# Patient Record
Sex: MALE | Race: WHITE | NOT HISPANIC OR LATINO | Employment: OTHER | URBAN - METROPOLITAN AREA
[De-identification: names, ages, dates, MRNs, and addresses within clinical notes are randomized per-mention and may not be internally consistent; named-entity substitution may affect disease eponyms.]

---

## 2019-09-03 ENCOUNTER — HOSPITAL ENCOUNTER (OUTPATIENT)
Dept: FAMILY MEDICINE CLINIC | Facility: CLINIC | Age: 69
Discharge: HOME | End: 2019-09-03
Attending: FAMILY MEDICINE

## 2019-09-03 LAB
ALBUMIN SERPL-MCNC: 4.7 G/DL (ref 3.5–5)
ALBUMIN/GLOB SERPL: 1.7 {RATIO} (ref 1.4–2.6)
ALP SERPL-CCNC: 67 U/L (ref 56–155)
ALT SERPL-CCNC: 7 U/L (ref 10–40)
ANION GAP SERPL CALC-SCNC: 17 MMOL/L (ref 8–19)
AST SERPL-CCNC: 16 U/L (ref 15–50)
BASOPHILS # BLD AUTO: 0.04 10*3/UL (ref 0–0.2)
BASOPHILS NFR BLD AUTO: 0.6 % (ref 0–3)
BILIRUB SERPL-MCNC: 0.97 MG/DL (ref 0.2–1.3)
BUN SERPL-MCNC: 10 MG/DL (ref 5–25)
BUN/CREAT SERPL: 8 {RATIO} (ref 6–20)
CALCIUM SERPL-MCNC: 9.5 MG/DL (ref 8.7–10.4)
CHLORIDE SERPL-SCNC: 97 MMOL/L (ref 99–111)
CONV ABS IMM GRAN: 0.04 10*3/UL (ref 0–0.2)
CONV CO2: 29 MMOL/L (ref 22–32)
CONV IMMATURE GRAN: 0.6 % (ref 0–1.8)
CONV TOTAL PROTEIN: 7.5 G/DL (ref 6.3–8.2)
CREAT UR-MCNC: 1.27 MG/DL (ref 0.7–1.2)
DEPRECATED RDW RBC AUTO: 47.2 FL (ref 35.1–43.9)
EOSINOPHIL # BLD AUTO: 0.19 10*3/UL (ref 0–0.7)
EOSINOPHIL # BLD AUTO: 3 % (ref 0–7)
ERYTHROCYTE [DISTWIDTH] IN BLOOD BY AUTOMATED COUNT: 13.3 % (ref 11.6–14.4)
GFR SERPLBLD BASED ON 1.73 SQ M-ARVRAT: 58 ML/MIN/{1.73_M2}
GLOBULIN UR ELPH-MCNC: 2.8 G/DL (ref 2–3.5)
GLUCOSE SERPL-MCNC: 99 MG/DL (ref 70–99)
HCT VFR BLD AUTO: 48.8 % (ref 42–52)
HGB BLD-MCNC: 16.5 G/DL (ref 14–18)
LYMPHOCYTES # BLD AUTO: 2.3 10*3/UL (ref 1–5)
LYMPHOCYTES NFR BLD AUTO: 36 % (ref 20–45)
MCH RBC QN AUTO: 32.5 PG (ref 27–31)
MCHC RBC AUTO-ENTMCNC: 33.8 G/DL (ref 33–37)
MCV RBC AUTO: 96.3 FL (ref 80–96)
MONOCYTES # BLD AUTO: 0.57 10*3/UL (ref 0.2–1.2)
MONOCYTES NFR BLD AUTO: 8.9 % (ref 3–10)
NEUTROPHILS # BLD AUTO: 3.25 10*3/UL (ref 2–8)
NEUTROPHILS NFR BLD AUTO: 50.9 % (ref 30–85)
NRBC CBCN: 0 % (ref 0–0.7)
OSMOLALITY SERPL CALC.SUM OF ELEC: 287 MOSM/KG (ref 273–304)
PLATELET # BLD AUTO: 112 10*3/UL (ref 130–400)
PMV BLD AUTO: 9.1 FL (ref 9.4–12.4)
POTASSIUM SERPL-SCNC: 4.4 MMOL/L (ref 3.5–5.3)
RBC # BLD AUTO: 5.07 10*6/UL (ref 4.7–6.1)
SODIUM SERPL-SCNC: 139 MMOL/L (ref 135–147)
TSH SERPL-ACNC: 1.88 M[IU]/L (ref 0.27–4.2)
WBC # BLD AUTO: 6.39 10*3/UL (ref 4.8–10.8)

## 2019-12-18 ENCOUNTER — HOSPITAL ENCOUNTER (OUTPATIENT)
Dept: FAMILY MEDICINE CLINIC | Facility: CLINIC | Age: 69
Discharge: HOME | End: 2019-12-18
Attending: FAMILY MEDICINE

## 2019-12-18 LAB
ALBUMIN SERPL-MCNC: 4.2 G/DL (ref 3.5–5)
ALBUMIN/GLOB SERPL: 1.6 {RATIO} (ref 1.4–2.6)
ALP SERPL-CCNC: 79 U/L (ref 56–155)
ALT SERPL-CCNC: 8 U/L (ref 10–40)
AMYLASE SERPL-CCNC: 40 U/L (ref 30–110)
ANION GAP SERPL CALC-SCNC: 15 MMOL/L (ref 8–19)
AST SERPL-CCNC: 19 U/L (ref 15–50)
BASOPHILS # BLD AUTO: 0.03 10*3/UL (ref 0–0.2)
BASOPHILS NFR BLD AUTO: 0.4 % (ref 0–3)
BILIRUB SERPL-MCNC: 1.61 MG/DL (ref 0.2–1.3)
BUN SERPL-MCNC: 10 MG/DL (ref 5–25)
BUN/CREAT SERPL: 9 {RATIO} (ref 6–20)
CALCIUM SERPL-MCNC: 9.2 MG/DL (ref 8.7–10.4)
CHLORIDE SERPL-SCNC: 82 MMOL/L (ref 99–111)
CONV ABS IMM GRAN: 0.16 10*3/UL (ref 0–0.2)
CONV CO2: 27 MMOL/L (ref 22–32)
CONV IMMATURE GRAN: 2 % (ref 0–1.8)
CONV TOTAL PROTEIN: 6.9 G/DL (ref 6.3–8.2)
CREAT UR-MCNC: 1.13 MG/DL (ref 0.7–1.2)
DEPRECATED RDW RBC AUTO: 46.3 FL (ref 35.1–43.9)
EOSINOPHIL # BLD AUTO: 0.08 10*3/UL (ref 0–0.7)
EOSINOPHIL # BLD AUTO: 1 % (ref 0–7)
ERYTHROCYTE [DISTWIDTH] IN BLOOD BY AUTOMATED COUNT: 13.3 % (ref 11.6–14.4)
GFR SERPLBLD BASED ON 1.73 SQ M-ARVRAT: >60 ML/MIN/{1.73_M2}
GLOBULIN UR ELPH-MCNC: 2.7 G/DL (ref 2–3.5)
GLUCOSE SERPL-MCNC: 91 MG/DL (ref 70–99)
HCT VFR BLD AUTO: 43.4 % (ref 42–52)
HGB BLD-MCNC: 15.4 G/DL (ref 14–18)
LIPASE SERPL-CCNC: 24 U/L (ref 5–51)
LYMPHOCYTES # BLD AUTO: 2.08 10*3/UL (ref 1–5)
LYMPHOCYTES NFR BLD AUTO: 25.6 % (ref 20–45)
MCH RBC QN AUTO: 33.4 PG (ref 27–31)
MCHC RBC AUTO-ENTMCNC: 35.5 G/DL (ref 33–37)
MCV RBC AUTO: 94.1 FL (ref 80–96)
MONOCYTES # BLD AUTO: 0.74 10*3/UL (ref 0.2–1.2)
MONOCYTES NFR BLD AUTO: 9.1 % (ref 3–10)
NEUTROPHILS # BLD AUTO: 5.03 10*3/UL (ref 2–8)
NEUTROPHILS NFR BLD AUTO: 61.9 % (ref 30–85)
NRBC CBCN: 0 % (ref 0–0.7)
OSMOLALITY SERPL CALC.SUM OF ELEC: 247 MOSM/KG (ref 273–304)
PLATELET # BLD AUTO: 143 10*3/UL (ref 130–400)
PMV BLD AUTO: 9.3 FL (ref 9.4–12.4)
POTASSIUM SERPL-SCNC: 4.5 MMOL/L (ref 3.5–5.3)
RBC # BLD AUTO: 4.61 10*6/UL (ref 4.7–6.1)
SODIUM SERPL-SCNC: 119 MMOL/L (ref 135–147)
TSH SERPL-ACNC: 2.58 M[IU]/L (ref 0.27–4.2)
WBC # BLD AUTO: 8.12 10*3/UL (ref 4.8–10.8)

## 2019-12-23 ENCOUNTER — HOSPITAL ENCOUNTER (OUTPATIENT)
Dept: FAMILY MEDICINE CLINIC | Facility: CLINIC | Age: 69
Discharge: HOME | End: 2019-12-23
Attending: FAMILY MEDICINE

## 2019-12-23 LAB
ALBUMIN SERPL-MCNC: 4.2 G/DL (ref 3.5–5)
ALBUMIN/GLOB SERPL: 1.6 {RATIO} (ref 1.4–2.6)
ALP SERPL-CCNC: 74 U/L (ref 56–155)
ALT SERPL-CCNC: 9 U/L (ref 10–40)
ANION GAP SERPL CALC-SCNC: 16 MMOL/L (ref 8–19)
AST SERPL-CCNC: 18 U/L (ref 15–50)
BILIRUB SERPL-MCNC: 0.84 MG/DL (ref 0.2–1.3)
BUN SERPL-MCNC: 10 MG/DL (ref 5–25)
BUN/CREAT SERPL: 13 {RATIO} (ref 6–20)
CALCIUM SERPL-MCNC: 9.2 MG/DL (ref 8.7–10.4)
CHLORIDE SERPL-SCNC: 78 MMOL/L (ref 99–111)
CONV CO2: 25 MMOL/L (ref 22–32)
CONV TOTAL PROTEIN: 6.9 G/DL (ref 6.3–8.2)
CREAT UR-MCNC: 0.8 MG/DL (ref 0.7–1.2)
GFR SERPLBLD BASED ON 1.73 SQ M-ARVRAT: >60 ML/MIN/{1.73_M2}
GLOBULIN UR ELPH-MCNC: 2.7 G/DL (ref 2–3.5)
GLUCOSE SERPL-MCNC: 102 MG/DL (ref 70–99)
OSMOLALITY SERPL CALC.SUM OF ELEC: 239 MOSM/KG (ref 273–304)
POTASSIUM SERPL-SCNC: 3.6 MMOL/L (ref 3.5–5.3)
SODIUM SERPL-SCNC: 115 MMOL/L (ref 135–147)

## 2019-12-27 ENCOUNTER — HOSPITAL ENCOUNTER (OUTPATIENT)
Dept: LAB | Facility: HOSPITAL | Age: 69
Discharge: HOME | End: 2019-12-27
Attending: FAMILY MEDICINE

## 2019-12-27 LAB
ANION GAP SERPL CALC-SCNC: 15 MMOL/L (ref 8–19)
BUN SERPL-MCNC: 6 MG/DL (ref 5–25)
BUN/CREAT SERPL: 6 {RATIO} (ref 6–20)
CALCIUM SERPL-MCNC: 9.1 MG/DL (ref 8.7–10.4)
CHLORIDE SERPL-SCNC: 93 MMOL/L (ref 99–111)
CHOLEST SERPL-MCNC: 147 MG/DL (ref 107–200)
CHOLEST/HDLC SERPL: 4 {RATIO} (ref 3–6)
CONV CO2: 27 MMOL/L (ref 22–32)
CREAT UR-MCNC: 0.98 MG/DL (ref 0.7–1.2)
GFR SERPLBLD BASED ON 1.73 SQ M-ARVRAT: >60 ML/MIN/{1.73_M2}
GLUCOSE SERPL-MCNC: 96 MG/DL (ref 70–99)
HDLC SERPL-MCNC: 37 MG/DL (ref 40–60)
LDLC SERPL CALC-MCNC: 77 MG/DL (ref 70–100)
OSMOLALITY SERPL CALC.SUM OF ELEC: 269 MOSM/KG (ref 273–304)
POTASSIUM SERPL-SCNC: 4 MMOL/L (ref 3.5–5.3)
SODIUM SERPL-SCNC: 131 MMOL/L (ref 135–147)
TRIGL SERPL-MCNC: 166 MG/DL (ref 40–150)
VLDLC SERPL-MCNC: 33 MG/DL (ref 5–37)

## 2020-02-18 ENCOUNTER — HOSPITAL ENCOUNTER (OUTPATIENT)
Dept: FAMILY MEDICINE CLINIC | Facility: CLINIC | Age: 70
Discharge: HOME | End: 2020-02-18
Attending: FAMILY MEDICINE

## 2020-02-18 LAB
ALBUMIN SERPL-MCNC: 4.2 G/DL (ref 3.5–5)
ALBUMIN/GLOB SERPL: 1.5 {RATIO} (ref 1.4–2.6)
ALP SERPL-CCNC: 75 U/L (ref 56–155)
ALT SERPL-CCNC: 6 U/L (ref 10–40)
ANION GAP SERPL CALC-SCNC: 18 MMOL/L (ref 8–19)
AST SERPL-CCNC: 15 U/L (ref 15–50)
BASOPHILS # BLD AUTO: 0.03 10*3/UL (ref 0–0.2)
BASOPHILS NFR BLD AUTO: 0.4 % (ref 0–3)
BILIRUB SERPL-MCNC: 0.78 MG/DL (ref 0.2–1.3)
BUN SERPL-MCNC: 8 MG/DL (ref 5–25)
BUN/CREAT SERPL: 7 {RATIO} (ref 6–20)
CALCIUM SERPL-MCNC: 9.4 MG/DL (ref 8.7–10.4)
CHLORIDE SERPL-SCNC: 87 MMOL/L (ref 99–111)
CONV ABS IMM GRAN: 0.08 10*3/UL (ref 0–0.2)
CONV CO2: 26 MMOL/L (ref 22–32)
CONV IMMATURE GRAN: 1.1 % (ref 0–1.8)
CONV TOTAL PROTEIN: 7 G/DL (ref 6.3–8.2)
CREAT UR-MCNC: 1.11 MG/DL (ref 0.7–1.2)
DEPRECATED RDW RBC AUTO: 43.8 FL (ref 35.1–43.9)
EOSINOPHIL # BLD AUTO: 0.23 10*3/UL (ref 0–0.7)
EOSINOPHIL # BLD AUTO: 3.2 % (ref 0–7)
ERYTHROCYTE [DISTWIDTH] IN BLOOD BY AUTOMATED COUNT: 12.6 % (ref 11.6–14.4)
GFR SERPLBLD BASED ON 1.73 SQ M-ARVRAT: >60 ML/MIN/{1.73_M2}
GLOBULIN UR ELPH-MCNC: 2.8 G/DL (ref 2–3.5)
GLUCOSE SERPL-MCNC: 106 MG/DL (ref 70–99)
HCT VFR BLD AUTO: 46.7 % (ref 42–52)
HGB BLD-MCNC: 15.6 G/DL (ref 14–18)
LYMPHOCYTES # BLD AUTO: 2.35 10*3/UL (ref 1–5)
LYMPHOCYTES NFR BLD AUTO: 32.5 % (ref 20–45)
MCH RBC QN AUTO: 31.9 PG (ref 27–31)
MCHC RBC AUTO-ENTMCNC: 33.4 G/DL (ref 33–37)
MCV RBC AUTO: 95.5 FL (ref 80–96)
MONOCYTES # BLD AUTO: 0.55 10*3/UL (ref 0.2–1.2)
MONOCYTES NFR BLD AUTO: 7.6 % (ref 3–10)
NEUTROPHILS # BLD AUTO: 4 10*3/UL (ref 2–8)
NEUTROPHILS NFR BLD AUTO: 55.2 % (ref 30–85)
NRBC CBCN: 0 % (ref 0–0.7)
OSMOLALITY SERPL CALC.SUM OF ELEC: 263 MOSM/KG (ref 273–304)
PLATELET # BLD AUTO: 138 10*3/UL (ref 130–400)
PMV BLD AUTO: 9.2 FL (ref 9.4–12.4)
POTASSIUM SERPL-SCNC: 4 MMOL/L (ref 3.5–5.3)
RBC # BLD AUTO: 4.89 10*6/UL (ref 4.7–6.1)
SODIUM SERPL-SCNC: 127 MMOL/L (ref 135–147)
WBC # BLD AUTO: 7.24 10*3/UL (ref 4.8–10.8)

## 2020-12-09 ENCOUNTER — HOSPITAL ENCOUNTER (OUTPATIENT)
Dept: CARDIOLOGY | Facility: HOSPITAL | Age: 70
Discharge: HOME | End: 2020-12-09
Attending: FAMILY MEDICINE

## 2021-04-19 ENCOUNTER — HOSPITAL ENCOUNTER (OUTPATIENT)
Dept: URGENT CARE | Facility: CLINIC | Age: 71
Discharge: HOME | End: 2021-04-19
Attending: NURSE PRACTITIONER

## 2021-06-24 ENCOUNTER — OFFICE VISIT (OUTPATIENT)
Dept: FAMILY MEDICINE CLINIC | Facility: CLINIC | Age: 71
End: 2021-06-24

## 2021-06-24 VITALS
HEART RATE: 72 BPM | TEMPERATURE: 97.2 F | SYSTOLIC BLOOD PRESSURE: 145 MMHG | BODY MASS INDEX: 20.72 KG/M2 | OXYGEN SATURATION: 97 % | HEIGHT: 71 IN | WEIGHT: 148 LBS | DIASTOLIC BLOOD PRESSURE: 67 MMHG

## 2021-06-24 DIAGNOSIS — Z12.5 SCREENING FOR PROSTATE CANCER: ICD-10-CM

## 2021-06-24 DIAGNOSIS — I25.10 CORONARY ARTERY DISEASE INVOLVING NATIVE HEART WITHOUT ANGINA PECTORIS, UNSPECIFIED VESSEL OR LESION TYPE: ICD-10-CM

## 2021-06-24 DIAGNOSIS — Z00.00 ANNUAL PHYSICAL EXAM: ICD-10-CM

## 2021-06-24 DIAGNOSIS — E78.2 MIXED HYPERLIPIDEMIA: ICD-10-CM

## 2021-06-24 DIAGNOSIS — Z51.81 ENCOUNTER FOR MEDICATION MONITORING: ICD-10-CM

## 2021-06-24 DIAGNOSIS — Z72.0 TOBACCO ABUSE: ICD-10-CM

## 2021-06-24 DIAGNOSIS — I10 ESSENTIAL HYPERTENSION: Primary | ICD-10-CM

## 2021-06-24 PROBLEM — F51.01 PRIMARY INSOMNIA: Status: ACTIVE | Noted: 2021-06-24

## 2021-06-24 PROBLEM — Z85.51 HISTORY OF BLADDER CANCER: Status: ACTIVE | Noted: 2021-06-24

## 2021-06-24 LAB
POC AMPHETAMINES: NEGATIVE
POC BARBITURATES: NEGATIVE
POC BENZODIAZEPHINES: POSITIVE
POC COCAINE: NEGATIVE
POC METHADONE: NEGATIVE
POC METHAMPHETAMINE SCREEN URINE: NEGATIVE
POC OPIATES: NEGATIVE
POC OXYCODONE: NEGATIVE
POC PHENCYCLIDINE: NEGATIVE
POC PROPOXYPHENE: NEGATIVE
POC THC: NEGATIVE
POC TRICYCLIC ANTIDEPRESSANTS: NEGATIVE

## 2021-06-24 PROCEDURE — 99204 OFFICE O/P NEW MOD 45 MIN: CPT | Performed by: FAMILY MEDICINE

## 2021-06-24 PROCEDURE — 80305 DRUG TEST PRSMV DIR OPT OBS: CPT | Performed by: FAMILY MEDICINE

## 2021-06-24 RX ORDER — ROSUVASTATIN CALCIUM 5 MG/1
5 TABLET, COATED ORAL
COMMUNITY
Start: 2021-05-11 | End: 2021-06-24 | Stop reason: SDUPTHER

## 2021-06-24 RX ORDER — ALPRAZOLAM 0.25 MG/1
0.25 TABLET ORAL NIGHTLY PRN
Qty: 30 TABLET | Refills: 2 | Status: SHIPPED | OUTPATIENT
Start: 2021-06-24 | End: 2022-01-20 | Stop reason: SDUPTHER

## 2021-06-24 RX ORDER — ALPRAZOLAM 0.25 MG/1
0.25 TABLET ORAL NIGHTLY PRN
COMMUNITY
End: 2021-06-24 | Stop reason: SDUPTHER

## 2021-06-24 RX ORDER — METOPROLOL SUCCINATE 25 MG/1
25 TABLET, EXTENDED RELEASE ORAL 2 TIMES DAILY
Qty: 60 TABLET | Refills: 5 | Status: SHIPPED | OUTPATIENT
Start: 2021-06-24 | End: 2021-11-29 | Stop reason: SDUPTHER

## 2021-06-24 RX ORDER — ROSUVASTATIN CALCIUM 5 MG/1
5 TABLET, COATED ORAL
Qty: 90 TABLET | Refills: 1 | Status: SHIPPED | OUTPATIENT
Start: 2021-06-24 | End: 2021-11-29 | Stop reason: SDUPTHER

## 2021-06-24 RX ORDER — METOPROLOL SUCCINATE 25 MG/1
25 TABLET, EXTENDED RELEASE ORAL 2 TIMES DAILY
COMMUNITY
Start: 2021-06-09 | End: 2021-06-24 | Stop reason: SDUPTHER

## 2021-09-10 ENCOUNTER — OFFICE VISIT (OUTPATIENT)
Dept: FAMILY MEDICINE CLINIC | Facility: CLINIC | Age: 71
End: 2021-09-10

## 2021-09-10 VITALS
HEART RATE: 92 BPM | TEMPERATURE: 96.8 F | BODY MASS INDEX: 20.38 KG/M2 | WEIGHT: 145.6 LBS | HEIGHT: 71 IN | OXYGEN SATURATION: 95 % | SYSTOLIC BLOOD PRESSURE: 145 MMHG | DIASTOLIC BLOOD PRESSURE: 72 MMHG

## 2021-09-10 DIAGNOSIS — J30.2 SEASONAL ALLERGIES: Primary | Chronic | ICD-10-CM

## 2021-09-10 DIAGNOSIS — Z00.00 ANNUAL PHYSICAL EXAM: ICD-10-CM

## 2021-09-10 DIAGNOSIS — Z72.0 TOBACCO ABUSE: Chronic | ICD-10-CM

## 2021-09-10 DIAGNOSIS — F51.01 PRIMARY INSOMNIA: ICD-10-CM

## 2021-09-10 DIAGNOSIS — E78.2 MIXED HYPERLIPIDEMIA: ICD-10-CM

## 2021-09-10 DIAGNOSIS — I10 ESSENTIAL HYPERTENSION: ICD-10-CM

## 2021-09-10 DIAGNOSIS — Z12.5 SCREENING FOR PROSTATE CANCER: ICD-10-CM

## 2021-09-10 PROCEDURE — 99214 OFFICE O/P EST MOD 30 MIN: CPT | Performed by: FAMILY MEDICINE

## 2021-09-10 PROCEDURE — 96372 THER/PROPH/DIAG INJ SC/IM: CPT | Performed by: FAMILY MEDICINE

## 2021-09-10 RX ORDER — CETIRIZINE HYDROCHLORIDE 10 MG/1
10 TABLET ORAL DAILY
Qty: 30 TABLET | Refills: 1 | Status: SHIPPED | OUTPATIENT
Start: 2021-09-10 | End: 2022-04-29

## 2021-09-10 RX ORDER — PROMETHAZINE HYDROCHLORIDE AND CODEINE PHOSPHATE 6.25; 1 MG/5ML; MG/5ML
5 SOLUTION ORAL EVERY 4 HOURS PRN
Qty: 180 ML | Refills: 0 | Status: SHIPPED | OUTPATIENT
Start: 2021-09-10 | End: 2021-12-20

## 2021-09-10 RX ORDER — ASPIRIN 81 MG/1
81 TABLET, CHEWABLE ORAL DAILY
COMMUNITY
End: 2021-11-03 | Stop reason: DRUGHIGH

## 2021-09-10 RX ORDER — TRIAMCINOLONE ACETONIDE 40 MG/ML
40 INJECTION, SUSPENSION INTRA-ARTICULAR; INTRAMUSCULAR ONCE
Status: COMPLETED | OUTPATIENT
Start: 2021-09-10 | End: 2021-09-10

## 2021-09-10 RX ADMIN — TRIAMCINOLONE ACETONIDE 40 MG: 40 INJECTION, SUSPENSION INTRA-ARTICULAR; INTRAMUSCULAR at 11:40

## 2021-09-10 NOTE — ASSESSMENT & PLAN NOTE
Hypertension is improving with treatment.  Dietary sodium restriction.  Stop smoking.  Continue current medications.  Blood pressure will be reassessed at the next regular appointment.

## 2021-09-10 NOTE — PROGRESS NOTES
"Chief Complaint  Sinusitis (Nasal Drainage and cough )    Subjective          Cm Flores presents to [facility]  He is here today to establish relation as a new patient.  He is a former patient of Dr. Pollack. He is single. He does not have any children. He has past medical history significant for kidney stones, coronary artery disease, peripheral vascular disease, tobacco abuse, AAA repair 2016 and bladder cancer in 2008.  He is currently smoking daily.     The patient has no other complaints today and denies chest pain, shortness of breath, weakness, numbness, nausea, vomiting, diarrhea, dizziness or syncopal event.        Objective   Vital Signs:   /72 (BP Location: Left arm, Patient Position: Sitting, Cuff Size: Adult)   Pulse 92   Temp 96.8 °F (36 °C) (Temporal)   Ht 180.3 cm (70.98\")   Wt 66 kg (145 lb 9.6 oz)   SpO2 95%   BMI 20.32 kg/m²     Physical Exam  Vitals reviewed.   Constitutional:       Appearance: Normal appearance. He is well-developed.   HENT:      Head: Normocephalic and atraumatic.      Right Ear: External ear normal.      Left Ear: External ear normal.      Mouth/Throat:      Pharynx: No oropharyngeal exudate.   Eyes:      Conjunctiva/sclera: Conjunctivae normal.      Pupils: Pupils are equal, round, and reactive to light.   Neck:      Vascular: No carotid bruit.   Cardiovascular:      Rate and Rhythm: Normal rate and regular rhythm.      Heart sounds: No murmur heard.   No friction rub. No gallop.    Pulmonary:      Effort: Pulmonary effort is normal.      Breath sounds: Normal breath sounds. No wheezing or rhonchi.   Abdominal:      General: Bowel sounds are normal. There is no distension.      Palpations: Abdomen is soft.      Tenderness: There is no abdominal tenderness.   Skin:     General: Skin is warm and dry.   Neurological:      Mental Status: He is alert and oriented to person, place, and time.      Cranial Nerves: No cranial nerve " deficit.      Motor: No weakness.   Psychiatric:         Mood and Affect: Mood and affect normal.         Behavior: Behavior normal.         Thought Content: Thought content normal.         Judgment: Judgment normal.        Result Review :                               Assessment and Plan    Diagnoses and all orders for this visit:    1. Seasonal allergies (Primary)  Comments:  PT's been cutting hay recently.  This is caused his allergies to flare.  Told cont. Flonase, given steroid injection, Zyrtec and cough syryup prescription.  Orders:  -     promethazine-codeine (PHENERGAN with CODEINE) 6.25-10 MG/5ML solution; Take 5 mL by mouth Every 4 (Four) Hours As Needed for Cough.  Dispense: 180 mL; Refill: 0  -     triamcinolone acetonide (KENALOG-40) injection 40 mg    2. Essential hypertension  Assessment & Plan:  Hypertension is improving with treatment.  Dietary sodium restriction.  Stop smoking.  Continue current medications.  Blood pressure will be reassessed at the next regular appointment.      3. Mixed hyperlipidemia  Assessment & Plan:  Lipid abnormalities are improving with treatment.  Pharmacotherapy as ordered.  Lipids will be reassessed 4 months.    Orders:  -     Lipid Panel; Future    4. Screening for prostate cancer  -     PSA Screen; Future    5. Annual physical exam  -     Comprehensive Metabolic Panel; Future  -     CBC & Differential; Future  -     TSH; Future    6. Tobacco abuse  Assessment & Plan:  Tobacco cessation was highly encouraged at today's visit.      7. Primary insomnia  Assessment & Plan:  The patient's insomnia is currently well controlled with 0.25 mg of Xanax nightly.  Controlled medication contract, Dawit and urine drug screen are in the chart for review.      Other orders  -     cetirizine (zyrTEC) 10 MG tablet; Take 1 tablet by mouth Daily.  Dispense: 30 tablet; Refill: 1      Follow Up   Return in about 4 months (around 1/10/2022).  Patient was given instructions and counseling  regarding his condition or for health maintenance advice. Please see specific information pulled into the AVS if appropriate.

## 2021-09-10 NOTE — ASSESSMENT & PLAN NOTE
The patient's insomnia is currently well controlled with 0.25 mg of Xanax nightly.  Controlled medication contract, Dawit and urine drug screen are in the chart for review.

## 2021-10-26 ENCOUNTER — APPOINTMENT (OUTPATIENT)
Dept: GENERAL RADIOLOGY | Facility: HOSPITAL | Age: 71
End: 2021-10-26

## 2021-10-26 ENCOUNTER — HOSPITAL ENCOUNTER (EMERGENCY)
Facility: HOSPITAL | Age: 71
Discharge: ANOTHER HOSPITAL | End: 2021-10-27
Attending: EMERGENCY MEDICINE | Admitting: EMERGENCY MEDICINE

## 2021-10-26 DIAGNOSIS — S72.002A CLOSED FRACTURE OF PROXIMAL END OF LEFT FEMUR, INITIAL ENCOUNTER (HCC): Primary | ICD-10-CM

## 2021-10-26 LAB
ALBUMIN SERPL-MCNC: 4.3 G/DL (ref 3.5–5.2)
ALBUMIN/GLOB SERPL: 1.7 G/DL
ALP SERPL-CCNC: 61 U/L (ref 39–117)
ALT SERPL W P-5'-P-CCNC: 8 U/L (ref 1–41)
ANION GAP SERPL CALCULATED.3IONS-SCNC: 9.5 MMOL/L (ref 5–15)
ANISOCYTOSIS BLD QL: NORMAL
AST SERPL-CCNC: 17 U/L (ref 1–40)
BASO STIPL COARSE BLD QL SMEAR: NORMAL
BASOPHILS # BLD AUTO: 0.02 10*3/MM3 (ref 0–0.2)
BASOPHILS NFR BLD AUTO: 0.3 % (ref 0–1.5)
BILIRUB SERPL-MCNC: 1 MG/DL (ref 0–1.2)
BUN SERPL-MCNC: 14 MG/DL (ref 8–23)
BUN/CREAT SERPL: 13.7 (ref 7–25)
CALCIUM SPEC-SCNC: 9 MG/DL (ref 8.6–10.5)
CHLORIDE SERPL-SCNC: 101 MMOL/L (ref 98–107)
CO2 SERPL-SCNC: 27.5 MMOL/L (ref 22–29)
CREAT SERPL-MCNC: 1.02 MG/DL (ref 0.76–1.27)
DACRYOCYTES BLD QL SMEAR: NORMAL
DEPRECATED RDW RBC AUTO: 52.8 FL (ref 37–54)
ELLIPTOCYTES BLD QL SMEAR: NORMAL
EOSINOPHIL # BLD AUTO: 0.07 10*3/MM3 (ref 0–0.4)
EOSINOPHIL NFR BLD AUTO: 1 % (ref 0.3–6.2)
ERYTHROCYTE [DISTWIDTH] IN BLOOD BY AUTOMATED COUNT: 15.2 % (ref 12.3–15.4)
GFR SERPL CREATININE-BSD FRML MDRD: 72 ML/MIN/1.73
GLOBULIN UR ELPH-MCNC: 2.5 GM/DL
GLUCOSE SERPL-MCNC: 131 MG/DL (ref 65–99)
HCT VFR BLD AUTO: 42.4 % (ref 37.5–51)
HGB BLD-MCNC: 13.9 G/DL (ref 13–17.7)
HOLD SPECIMEN: NORMAL
IMM GRANULOCYTES # BLD AUTO: 0.06 10*3/MM3 (ref 0–0.05)
IMM GRANULOCYTES NFR BLD AUTO: 0.9 % (ref 0–0.5)
LARGE PLATELETS: NORMAL
LYMPHOCYTES # BLD AUTO: 1.94 10*3/MM3 (ref 0.7–3.1)
LYMPHOCYTES NFR BLD AUTO: 28 % (ref 19.6–45.3)
MCH RBC QN AUTO: 30.8 PG (ref 26.6–33)
MCHC RBC AUTO-ENTMCNC: 32.8 G/DL (ref 31.5–35.7)
MCV RBC AUTO: 94 FL (ref 79–97)
MONOCYTES # BLD AUTO: 0.54 10*3/MM3 (ref 0.1–0.9)
MONOCYTES NFR BLD AUTO: 7.8 % (ref 5–12)
NEUTROPHILS NFR BLD AUTO: 4.31 10*3/MM3 (ref 1.7–7)
NEUTROPHILS NFR BLD AUTO: 62 % (ref 42.7–76)
NRBC BLD AUTO-RTO: 0 /100 WBC (ref 0–0.2)
OVALOCYTES BLD QL SMEAR: NORMAL
PLATELET # BLD AUTO: 98 10*3/MM3 (ref 140–450)
PMV BLD AUTO: 8.7 FL (ref 6–12)
POTASSIUM SERPL-SCNC: 4.4 MMOL/L (ref 3.5–5.2)
PROT SERPL-MCNC: 6.8 G/DL (ref 6–8.5)
RBC # BLD AUTO: 4.51 10*6/MM3 (ref 4.14–5.8)
SMALL PLATELETS BLD QL SMEAR: NORMAL
SODIUM SERPL-SCNC: 138 MMOL/L (ref 136–145)
TARGETS BLD QL SMEAR: NORMAL
WBC # BLD AUTO: 6.94 10*3/MM3 (ref 3.4–10.8)
WBC MORPH BLD: NORMAL
WHOLE BLOOD HOLD SPECIMEN: NORMAL

## 2021-10-26 PROCEDURE — 96374 THER/PROPH/DIAG INJ IV PUSH: CPT

## 2021-10-26 PROCEDURE — 80053 COMPREHEN METABOLIC PANEL: CPT | Performed by: EMERGENCY MEDICINE

## 2021-10-26 PROCEDURE — 73502 X-RAY EXAM HIP UNI 2-3 VIEWS: CPT

## 2021-10-26 PROCEDURE — 71045 X-RAY EXAM CHEST 1 VIEW: CPT

## 2021-10-26 PROCEDURE — 25010000002 FENTANYL CITRATE (PF) 50 MCG/ML SOLUTION: Performed by: EMERGENCY MEDICINE

## 2021-10-26 PROCEDURE — 85025 COMPLETE CBC W/AUTO DIFF WBC: CPT | Performed by: EMERGENCY MEDICINE

## 2021-10-26 PROCEDURE — 85007 BL SMEAR W/DIFF WBC COUNT: CPT | Performed by: EMERGENCY MEDICINE

## 2021-10-26 PROCEDURE — 99284 EMERGENCY DEPT VISIT MOD MDM: CPT

## 2021-10-26 RX ORDER — FENTANYL CITRATE 50 UG/ML
25 INJECTION, SOLUTION INTRAMUSCULAR; INTRAVENOUS
Status: DISCONTINUED | OUTPATIENT
Start: 2021-10-26 | End: 2021-10-27 | Stop reason: HOSPADM

## 2021-10-26 RX ADMIN — SODIUM CHLORIDE 1000 ML: 9 INJECTION, SOLUTION INTRAVENOUS at 19:32

## 2021-10-26 RX ADMIN — FENTANYL CITRATE 25 MCG: 50 INJECTION, SOLUTION INTRAMUSCULAR; INTRAVENOUS at 19:34

## 2021-10-27 VITALS
WEIGHT: 140.65 LBS | BODY MASS INDEX: 19.69 KG/M2 | RESPIRATION RATE: 16 BRPM | HEIGHT: 71 IN | SYSTOLIC BLOOD PRESSURE: 113 MMHG | DIASTOLIC BLOOD PRESSURE: 67 MMHG | OXYGEN SATURATION: 97 % | TEMPERATURE: 98.5 F | HEART RATE: 80 BPM

## 2021-10-27 PROCEDURE — 25010000002 FENTANYL CITRATE (PF) 50 MCG/ML SOLUTION: Performed by: EMERGENCY MEDICINE

## 2021-10-27 PROCEDURE — 96376 TX/PRO/DX INJ SAME DRUG ADON: CPT

## 2021-10-27 RX ORDER — FENTANYL CITRATE 50 UG/ML
25 INJECTION, SOLUTION INTRAMUSCULAR; INTRAVENOUS ONCE
Status: COMPLETED | OUTPATIENT
Start: 2021-10-27 | End: 2021-10-27

## 2021-10-27 RX ADMIN — FENTANYL CITRATE 25 MCG: 50 INJECTION, SOLUTION INTRAMUSCULAR; INTRAVENOUS at 01:59

## 2021-10-28 ENCOUNTER — TELEPHONE (OUTPATIENT)
Dept: FAMILY MEDICINE CLINIC | Facility: CLINIC | Age: 71
End: 2021-10-28

## 2021-10-28 DIAGNOSIS — S72.145S CLOSED NONDISPLACED INTERTROCHANTERIC FRACTURE OF LEFT FEMUR, SEQUELA: Primary | ICD-10-CM

## 2021-10-28 NOTE — TELEPHONE ENCOUNTER
Caller: DANNY JENSEN    Relationship: Friend    Anuel call back number: [phone]    Who are you requesting to speak with (clinical staff, provider,  specific staff member): DR. HOWELL OR HIS STAFF      What was the call regarding: THE PATIENT'S FRIEND DANNY HAD CALLED ON HIS BEHALF TO UPDATE THAT THE PATIENT HAD A RECENT FALL. HE HAD GONE TO THE EMERGENCY ROOM AND DID NOT LIKE THE CARE HE WAS RECEIVING SO HE HAD SIGNED HIMSELF OUT.    DANNY IS HOPING TO SPEAK WITH DR. HOWELL ABOUT GETTING THE PATIENT PHYSICAL THERAPY DUE TO IT BEING HARD FOR THE PATIENT TO MOVE AROUND.     Do you require a callback: YES, PLEASE CALL AND ADVISE.

## 2021-10-29 NOTE — TELEPHONE ENCOUNTER
I spoke with Rebecca and she states pt can not come in for an appt because they physically can not move him. States pt can not move his left leg at all to get in and out of car, states there is a crack in femur states pt was in hospital and left on his own will from [facility]. States pt had xrays done at [facility] prior to being transferred to [facility]. Rebecca states pt wants a second opinion because one doctor is telling them surgery and one is telling him PT. Rebecca request a referral for Ortho at Dr. Cabrera. Please advise.

## 2021-10-29 NOTE — TELEPHONE ENCOUNTER
Caller: DANNY JENSEN NOT ON  VERBAL    Relationship: Friend    Best call back number: [phone]    Who are you requesting to speak with (clinical staff, provider,  specific staff member): MEDICAL STAFF    What was the call regarding: DANNY WAS TOLD SHE WOULD GET A CALL BACK YESTERDAY. SHE WAS UPSET THAT NO ONE HAS CALLED HER IN REGARDS TO PATIENT. ATTEMPTED TO WARM TRANSFER. SHE WANTS TO KNOW WHAT CAN BE DONE FOR THE PATIENT. PLEASE CALL PATIENT TO ADVISE.

## 2021-10-29 NOTE — TELEPHONE ENCOUNTER
Caller: DANNY JENSEN    Relationship to patient: Friend    Best call back number: [phone]    Patient is needing: PATIENTS FRIEND CALLED STATING SHE IS RETURNING A CALL BACK FOR ALEIDA GANT. SHE STATED THAT MS ALEIDA GANT TOLD HER TO RETURN HER CALL BEFORE 5. PLEASE ADVISE THANK YOU.       PATIENTS FRIEND IS NOT ON  VERBAL       HUB STAFF UNABLE TO WARM TRANSFER

## 2021-11-02 ENCOUNTER — TELEPHONE (OUTPATIENT)
Dept: ORTHOPEDIC SURGERY | Facility: CLINIC | Age: 71
End: 2021-11-02

## 2021-11-02 ENCOUNTER — TELEPHONE (OUTPATIENT)
Dept: FAMILY MEDICINE CLINIC | Facility: CLINIC | Age: 71
End: 2021-11-02

## 2021-11-02 NOTE — TELEPHONE ENCOUNTER
Pt was wondering the status of his referral to ortho, I called ortho and spoke with Jolene. Jolene stated they were waiting on records from [facility] but was not sure if their office would see him due to Dr. Nash referring to [facility] due to complexity of hip fracture. Spoke with pt and explained this to him and he is very against going to [facility] states he wants to be seen in Dr. Sesay office. Cm gave me verbal permission to speak with friend of family that helps take care of him Rebecca Flores. I again explained the status of referral to Rebecca. I called [facility] and requested records be sent to Dr. Sesay office and our office so both Keith and Rao could review.

## 2021-11-02 NOTE — TELEPHONE ENCOUNTER
Dr Yeh office called states that pt was seen in ER for hip fx and Dr Nash stated that because of complexity of hip fx he had him transferred to [facility]. Pt is gonna have records sent to us from [facility]  . Dr Yeh office would like Dr Parker to review and see if he will see pt     Waiting on records from [facility]

## 2021-11-03 ENCOUNTER — OFFICE VISIT (OUTPATIENT)
Dept: ORTHOPEDIC SURGERY | Facility: CLINIC | Age: 71
End: 2021-11-03

## 2021-11-03 ENCOUNTER — HOSPITAL ENCOUNTER (INPATIENT)
Facility: HOSPITAL | Age: 71
LOS: 1 days | Discharge: NURSING, REHAB OR LONG-TERM CARE | End: 2021-11-04
Attending: FAMILY MEDICINE | Admitting: FAMILY MEDICINE

## 2021-11-03 ENCOUNTER — TELEPHONE (OUTPATIENT)
Dept: FAMILY MEDICINE CLINIC | Facility: CLINIC | Age: 71
End: 2021-11-03

## 2021-11-03 VITALS — HEIGHT: 71 IN | WEIGHT: 140 LBS | HEART RATE: 61 BPM | BODY MASS INDEX: 19.6 KG/M2 | OXYGEN SATURATION: 100 %

## 2021-11-03 DIAGNOSIS — Z78.9 DECREASED ACTIVITIES OF DAILY LIVING (ADL): ICD-10-CM

## 2021-11-03 DIAGNOSIS — M97.02XA PERIPROSTHETIC FRACTURE AROUND INTERNAL PROSTHETIC LEFT HIP JOINT, INITIAL ENCOUNTER (HCC): Primary | ICD-10-CM

## 2021-11-03 DIAGNOSIS — M25.552 LEFT HIP PAIN: ICD-10-CM

## 2021-11-03 DIAGNOSIS — R26.2 DIFFICULTY IN WALKING: Primary | ICD-10-CM

## 2021-11-03 LAB
ALBUMIN SERPL-MCNC: 4 G/DL (ref 3.5–5.2)
ALBUMIN/GLOB SERPL: 1.3 G/DL
ALP SERPL-CCNC: 67 U/L (ref 39–117)
ALT SERPL W P-5'-P-CCNC: 5 U/L (ref 1–41)
ANION GAP SERPL CALCULATED.3IONS-SCNC: 12.1 MMOL/L (ref 5–15)
ANISOCYTOSIS BLD QL: NORMAL
AST SERPL-CCNC: 13 U/L (ref 1–40)
BASOPHILS # BLD AUTO: 0.03 10*3/MM3 (ref 0–0.2)
BASOPHILS NFR BLD AUTO: 0.4 % (ref 0–1.5)
BILIRUB SERPL-MCNC: 1.7 MG/DL (ref 0–1.2)
BUN SERPL-MCNC: 14 MG/DL (ref 8–23)
BUN/CREAT SERPL: 14.6 (ref 7–25)
CALCIUM SPEC-SCNC: 8.9 MG/DL (ref 8.6–10.5)
CHLORIDE SERPL-SCNC: 94 MMOL/L (ref 98–107)
CLUMPED PLATELETS: PRESENT
CO2 SERPL-SCNC: 27.9 MMOL/L (ref 22–29)
CREAT SERPL-MCNC: 0.96 MG/DL (ref 0.76–1.27)
DEPRECATED RDW RBC AUTO: 50.4 FL (ref 37–54)
EOSINOPHIL # BLD AUTO: 0.15 10*3/MM3 (ref 0–0.4)
EOSINOPHIL NFR BLD AUTO: 2.1 % (ref 0.3–6.2)
ERYTHROCYTE [DISTWIDTH] IN BLOOD BY AUTOMATED COUNT: 15 % (ref 12.3–15.4)
GFR SERPL CREATININE-BSD FRML MDRD: 78 ML/MIN/1.73
GLOBULIN UR ELPH-MCNC: 3 GM/DL
GLUCOSE SERPL-MCNC: 102 MG/DL (ref 65–99)
HCT VFR BLD AUTO: 38.1 % (ref 37.5–51)
HGB BLD-MCNC: 12.5 G/DL (ref 13–17.7)
IMM GRANULOCYTES # BLD AUTO: 0.06 10*3/MM3 (ref 0–0.05)
IMM GRANULOCYTES NFR BLD AUTO: 0.8 % (ref 0–0.5)
LYMPHOCYTES # BLD AUTO: 2.55 10*3/MM3 (ref 0.7–3.1)
LYMPHOCYTES NFR BLD AUTO: 35.3 % (ref 19.6–45.3)
MCH RBC QN AUTO: 30.4 PG (ref 26.6–33)
MCHC RBC AUTO-ENTMCNC: 32.8 G/DL (ref 31.5–35.7)
MCV RBC AUTO: 92.7 FL (ref 79–97)
MONOCYTES # BLD AUTO: 0.56 10*3/MM3 (ref 0.1–0.9)
MONOCYTES NFR BLD AUTO: 7.8 % (ref 5–12)
NEUTROPHILS NFR BLD AUTO: 3.87 10*3/MM3 (ref 1.7–7)
NEUTROPHILS NFR BLD AUTO: 53.6 % (ref 42.7–76)
NRBC BLD AUTO-RTO: 0 /100 WBC (ref 0–0.2)
PLATELET # BLD AUTO: 159 10*3/MM3 (ref 140–450)
PMV BLD AUTO: 8.5 FL (ref 6–12)
POLYCHROMASIA BLD QL SMEAR: NORMAL
POTASSIUM SERPL-SCNC: 4.2 MMOL/L (ref 3.5–5.2)
PROT SERPL-MCNC: 7 G/DL (ref 6–8.5)
RBC # BLD AUTO: 4.11 10*6/MM3 (ref 4.14–5.8)
SMALL PLATELETS BLD QL SMEAR: NORMAL
SODIUM SERPL-SCNC: 134 MMOL/L (ref 136–145)
WBC # BLD AUTO: 7.22 10*3/MM3 (ref 3.4–10.8)
WBC MORPH BLD: NORMAL

## 2021-11-03 PROCEDURE — 99222 1ST HOSP IP/OBS MODERATE 55: CPT | Performed by: INTERNAL MEDICINE

## 2021-11-03 PROCEDURE — 85007 BL SMEAR W/DIFF WBC COUNT: CPT | Performed by: FAMILY MEDICINE

## 2021-11-03 PROCEDURE — 80053 COMPREHEN METABOLIC PANEL: CPT | Performed by: FAMILY MEDICINE

## 2021-11-03 PROCEDURE — 94799 UNLISTED PULMONARY SVC/PX: CPT

## 2021-11-03 PROCEDURE — 99203 OFFICE O/P NEW LOW 30 MIN: CPT | Performed by: STUDENT IN AN ORGANIZED HEALTH CARE EDUCATION/TRAINING PROGRAM

## 2021-11-03 PROCEDURE — 85025 COMPLETE CBC W/AUTO DIFF WBC: CPT | Performed by: FAMILY MEDICINE

## 2021-11-03 RX ORDER — ASPIRIN 325 MG
325 TABLET ORAL DAILY
Status: DISCONTINUED | OUTPATIENT
Start: 2021-11-03 | End: 2022-10-31 | Stop reason: HOSPADM

## 2021-11-03 RX ORDER — BISACODYL 5 MG/1
5 TABLET, DELAYED RELEASE ORAL DAILY PRN
Status: DISCONTINUED | OUTPATIENT
Start: 2021-11-03 | End: 2021-11-04 | Stop reason: HOSPADM

## 2021-11-03 RX ORDER — AMOXICILLIN 250 MG
2 CAPSULE ORAL 2 TIMES DAILY
Status: DISCONTINUED | OUTPATIENT
Start: 2021-11-03 | End: 2021-11-04 | Stop reason: HOSPADM

## 2021-11-03 RX ORDER — ONDANSETRON 2 MG/ML
4 INJECTION INTRAMUSCULAR; INTRAVENOUS EVERY 6 HOURS PRN
Status: DISCONTINUED | OUTPATIENT
Start: 2021-11-03 | End: 2021-11-04 | Stop reason: HOSPADM

## 2021-11-03 RX ORDER — SODIUM CHLORIDE 0.9 % (FLUSH) 0.9 %
10 SYRINGE (ML) INJECTION AS NEEDED
Status: DISCONTINUED | OUTPATIENT
Start: 2021-11-03 | End: 2021-11-04 | Stop reason: HOSPADM

## 2021-11-03 RX ORDER — ALUMINA, MAGNESIA, AND SIMETHICONE 2400; 2400; 240 MG/30ML; MG/30ML; MG/30ML
15 SUSPENSION ORAL EVERY 6 HOURS PRN
Status: DISCONTINUED | OUTPATIENT
Start: 2021-11-03 | End: 2021-11-04 | Stop reason: HOSPADM

## 2021-11-03 RX ORDER — ACETAMINOPHEN 325 MG/1
650 TABLET ORAL EVERY 4 HOURS PRN
Status: DISCONTINUED | OUTPATIENT
Start: 2021-11-03 | End: 2021-11-04 | Stop reason: HOSPADM

## 2021-11-03 RX ORDER — HYDROCODONE BITARTRATE AND ACETAMINOPHEN 10; 325 MG/1; MG/1
1 TABLET ORAL EVERY 6 HOURS PRN
Status: DISCONTINUED | OUTPATIENT
Start: 2021-11-03 | End: 2021-11-04 | Stop reason: HOSPADM

## 2021-11-03 RX ORDER — SODIUM CHLORIDE 0.9 % (FLUSH) 0.9 %
10 SYRINGE (ML) INJECTION EVERY 12 HOURS SCHEDULED
Status: DISCONTINUED | OUTPATIENT
Start: 2021-11-03 | End: 2021-11-04 | Stop reason: HOSPADM

## 2021-11-03 RX ORDER — POLYETHYLENE GLYCOL 3350 17 G/17G
17 POWDER, FOR SOLUTION ORAL DAILY PRN
Status: DISCONTINUED | OUTPATIENT
Start: 2021-11-03 | End: 2021-11-04 | Stop reason: HOSPADM

## 2021-11-03 RX ORDER — ROSUVASTATIN CALCIUM 5 MG/1
5 TABLET, COATED ORAL NIGHTLY
Status: DISCONTINUED | OUTPATIENT
Start: 2021-11-03 | End: 2021-11-04 | Stop reason: HOSPADM

## 2021-11-03 RX ORDER — BISACODYL 10 MG
10 SUPPOSITORY, RECTAL RECTAL DAILY PRN
Status: DISCONTINUED | OUTPATIENT
Start: 2021-11-03 | End: 2021-11-04 | Stop reason: HOSPADM

## 2021-11-03 RX ORDER — HYDROCODONE BITARTRATE AND ACETAMINOPHEN 5; 325 MG/1; MG/1
1 TABLET ORAL EVERY 6 HOURS PRN
Status: DISCONTINUED | OUTPATIENT
Start: 2021-11-03 | End: 2021-11-04 | Stop reason: HOSPADM

## 2021-11-03 RX ORDER — METOPROLOL TARTRATE 50 MG/1
50 TABLET, FILM COATED ORAL EVERY 12 HOURS SCHEDULED
Status: DISCONTINUED | OUTPATIENT
Start: 2021-11-03 | End: 2021-11-04 | Stop reason: HOSPADM

## 2021-11-03 RX ORDER — MORPHINE SULFATE 2 MG/ML
2 INJECTION, SOLUTION INTRAMUSCULAR; INTRAVENOUS EVERY 4 HOURS PRN
Status: DISCONTINUED | OUTPATIENT
Start: 2021-11-03 | End: 2021-11-03

## 2021-11-03 RX ADMIN — METOPROLOL TARTRATE 50 MG: 50 TABLET, FILM COATED ORAL at 21:37

## 2021-11-03 RX ADMIN — ROSUVASTATIN CALCIUM 5 MG: 5 TABLET, FILM COATED ORAL at 21:38

## 2021-11-03 RX ADMIN — DOCUSATE SODIUM 50MG AND SENNOSIDES 8.6MG 2 TABLET: 8.6; 5 TABLET, FILM COATED ORAL at 21:38

## 2021-11-03 NOTE — TELEPHONE ENCOUNTER
Spoke with patient regarding lab results. Patient is currently unavailable to complete the labs due to having a broken leg and going to [place]. I will extend the labs until after his follow-up appointment with Dr. Yeh.

## 2021-11-03 NOTE — H&P
[facility] HISTORY AND PHYSICAL  Date: 11/3/2021   Patient Name: Cm Flores  : 1950  MRN: [id number]  Primary Care Physician:  Nancy Yeh DO  Date of admission: 11/3/2021    Subjective   Subjective     Chief Complaint: Intractable left hip pain    HPI:    Cm Flores is a 70 y.o. male past medical history of peripheral vascular disease, coronary artery disease, and left hip replacement who is admitted today due to complicated left hip fracture after hip replacement 20 years ago.    Patient was initially seen in our emergency department on 10/26 due to a fall and a hip fracture on the same side he had a previous hip replacement.  At that time, the patient was transferred to [place] for further evaluation.  There was some misunderstanding about the surgical plan and the patient eventually left [place].  He was seen today by Dr. Nash in Ortho clinic.  He is unable to walk and has intractable pain.  As result, the patient is going to be admitted to the hospital for pain management and placement.  He has had no fevers.  No chills.  No nausea.  No vomiting.  No dysuria.  No vision loss.  No weakness.  He does state that he cannot walk due to the pain.  States that he has to use a wheelchair.  Says he cannot get to the bathroom without a wheelchair.    His vital signs are all within normal limits.  He continues to have left hip pain only with movement not at rest.  Be admitted to the hospital for continued management uncontrolled pain and evaluation for placement.    10 out of 14 systems reviewed abnormalities noted above.      Personal History     Past Medical History:  Coronary artery disease  Peripheral vascular disease    Past Surgical History:    CABG  AAA repair   Hip replacement    Family History:     Noncontributory    Social History:   Still smokes about a pack of cigarettes per day  Does not drink alcohol    Home Medications:  ALPRAZolam, cetirizine, metoprolol succinate XL,  promethazine-codeine, and rosuvastatin    Allergies:  Allergies   Allergen Reactions   • Azithromycin Unknown - High Severity     zpak   • Morphine Unknown - Low Severity   • Penicillins Unknown - Low Severity   • Sulfa Antibiotics Unknown - Low Severity         Objective   Objective     Vitals:   Heart Rate:  [61] 61    Physical Exam    Constitutional: Awake, alert, no acute distress   Eyes: Pupils equal, sclerae anicteric, no conjunctival injection   HENT: NCAT, mucous membranes moist   Neck: Supple, no thyromegaly, no lymphadenopathy, trachea midline   Respiratory: Clear to auscultation bilaterally, nonlabored respirations    Cardiovascular: RRR, no murmurs, rubs, or gallops, palpable pedal pulses bilaterally   Gastrointestinal: Positive bowel sounds, soft, nontender, nondistended   Musculoskeletal: Sitting in chair.  Pain to palpation of the left hip.  No deformity appreciated at this time.   Psychiatric: Appropriate affect, cooperative   Neurologic: Oriented x 3, strength symmetric in all extremities, Cranial Nerves grossly intact to confrontation, speech clear   Skin: No rashes     Result Review    Result Review:  I have personally reviewed the results from the time of this admission to 11/3/2021 17:21 EDT and agree with these findings:      Assessment/Plan   Assessment / Plan     Assessment/Plan:   Intractable left hip pain after fracture on previous replaced hip  Coronary artery disease  PAD  Tobacco abuse    Plan:  --Admit to the hospital  --Consult orthopedics  --No surgical intervention planned at this time  --Patient refused IV so we will get oral pain medications  --Consult PT/OT  --Bowel regimen  --DVT prophylaxis as per below  --We will discuss with social work for placement signature tomorrow for continued rehab  --Patient is to be nonweightbearing for 6 weeks using a walker for assistance.  --Discussed the importance of smoking cessation      DVT prophylaxis:  Lovenox  CODE STATUS:    Code Status  (Patient has no pulse and is not breathing): CPR (Attempt to Resuscitate)  Medical Interventions (Patient has pulse or is breathing): Full Support      Admission Status:  I believe this patient meets inpatient status.    Electronically signed by Shahriar Foster MD, 11/03/21, 5:21 PM EDT.

## 2021-11-03 NOTE — TELEPHONE ENCOUNTER
I spoke with Dr. Parker and Dr. Nash and they both say they will follow in our office.  Dr. Nash said he would see this today.

## 2021-11-03 NOTE — PROGRESS NOTES
Chief Complaint  Pain of the Left Hip    Subjective          Cm Flores presents to [facility] for   History of Present Illness    Cm presents today as a new patient with left hip pain. He has a left periprosthetic hip fracture. He explains he had a fall on 10/26/2021 while walking down the stairs. He states he fell on his right hip that was placed around 1999. He denies issues with this hip prior to his fall. Prior to the fall, he did not use any assistive devices for ambulation. Patient does not currently have home health or therapy coming to his home. Takes 81mg Aspirin daily and reports history of cardiac surgery. He has not been taking any medication for his pain. Patient lives alone.  He was seen at [facility] after his injury.  He reports that he had plan for surgery, but there was some uncertainty.  Due to the delay he left [place].  His initial hip replacement was done in [place] approximately 20 years ago.    He reports difficulty at home since leaving AM.  He is struggling with his basic ADLs.    Allergies   Allergen Reactions   • Azithromycin Unknown - High Severity     zpak   • Morphine Unknown - Low Severity   • Penicillins Unknown - Low Severity   • Sulfa Antibiotics Unknown - Low Severity        Social History     Socioeconomic History   • Marital status: Single   Tobacco Use   • Smoking status: Current Every Day Smoker     Packs/day: 1.00   • Smokeless tobacco: Never Used   Vaping Use   • Vaping Use: Never used   Substance and Sexual Activity   • Alcohol use: Never   • Drug use: Never   • Sexual activity: Defer        I reviewed the patient's chief complaint, history of present illness, review of systems, past medical history, surgical history, family history, social history, medications, and allergy list.     REVIEW OF SYSTEMS    Constitutional: Denies fevers, chills, weight loss  Cardiovascular: Denies chest pain, shortness of breath  Skin: Denies rashes, acute skin  "changes  Neurologic: Denies headache, loss of consciousness  MSK: Left hip pain.       Objective   Vital Signs:   Pulse 61   Ht 180.3 cm (71\")   Wt 63.5 kg (140 lb)   SpO2 100%   BMI 19.53 kg/m²     Body mass index is 19.53 kg/m².    Physical Exam    General: Alert. No acute distress.   Left lower extremity: Well healed lateral incision of the left hip. Tender to palpation over the greater trochanter. No bruising or wounds. No pain with passive hip range of motion. Pain and weakness with hip flexion and hip abduction. Sensation intact over the dorsal and plantar foot. Calf soft. Intact active ankle dorsiflexion and plantarflexion.     Procedures    Imaging Results (Most Recent)     Procedure Component Value Units Date/Time    XR Hip With or Without Pelvis 2 - 3 View Left [[id number]] Resulted: 11/03/21 1602     Updated: 11/03/21 1604    Narrative:      Indications: Follow-up left periprosthetic hip fracture    Views: AP pelvis, AP and frog lateral left hip    Findings: Press-fit left total hip arthroplasty implants in place.  There   is a periprosthetic fracture through the greater trochanter.  There is an   oblique fracture line at the level of the lesser trochanter.  A separate   displaced greater trochanteric fragment has retracted proximally.  The hip   remains reduced.  No obvious subsidence of the stem.  No obvious   loosening.    Comparative Data: Comparative data found and reviewed today                 Assessment and Plan    Diagnoses and all orders for this visit:    1. Periprosthetic fracture around internal prosthetic left hip joint, initial encounter ([facility]) (Primary)    2. Left hip pain  -     XR Hip With or Without Pelvis 2 - 3 View Left  -     aspirin tablet 325 mg      Cm presents today as a new patient with left hip pain. He has a left periprosthetic hip fracture after experiencing a fall on 10/26/2021. Discussion with patient and son regarding home therapy vs inpatient nursing facility for " therapy. Patient and son elected for patient to attend inpatient therapy, our office will work on coordinating this.  He may require inpatient admission to assist with this process.     No surgical interventions planned at this time.  Patient to be nonweightbearing for 6 weeks, using a walker for assistance. He is advised to work on knee and ankle range of motion to prevent stiffness. Discussion with patient that he might always have to use an assistive device for ambulation. Prescription for patient to start taking 325mg of Aspirin daily for the next 4 weeks. Follow up in 1 week with reevaluation. Will obtain new x-rays of left hip at next visit.     Call or return if symptoms worsen or patient has any concerns.   Will obtain X-Rays of left hip at next visit.     Scribed for Maurisio Nash MD by Maurisio Nash MD  11/03/2021   10:41 EDT         Follow Up   Return in about 1 week (around 11/10/2021).  Patient was given instructions and counseling regarding his condition or for health maintenance advice. Please see specific information pulled into the AVS if appropriate.       I have personally performed the services described in this document as scribed by the above individual and it is both accurate and complete.     Maurisio Nash MD  11/03/21  16:05 EDT

## 2021-11-04 VITALS
TEMPERATURE: 98.1 F | DIASTOLIC BLOOD PRESSURE: 47 MMHG | OXYGEN SATURATION: 93 % | HEIGHT: 71 IN | RESPIRATION RATE: 16 BRPM | HEART RATE: 67 BPM | BODY MASS INDEX: 19.6 KG/M2 | WEIGHT: 140 LBS | SYSTOLIC BLOOD PRESSURE: 99 MMHG

## 2021-11-04 LAB
ALBUMIN SERPL-MCNC: 3.5 G/DL (ref 3.5–5.2)
ALP SERPL-CCNC: 58 U/L (ref 39–117)
ALT SERPL W P-5'-P-CCNC: <5 U/L (ref 1–41)
ANION GAP SERPL CALCULATED.3IONS-SCNC: 6.2 MMOL/L (ref 5–15)
AST SERPL-CCNC: 11 U/L (ref 1–40)
BASOPHILS # BLD AUTO: 0.03 10*3/MM3 (ref 0–0.2)
BASOPHILS NFR BLD AUTO: 0.5 % (ref 0–1.5)
BILIRUB CONJ SERPL-MCNC: 0.3 MG/DL (ref 0–0.3)
BILIRUB INDIRECT SERPL-MCNC: 1.2 MG/DL
BILIRUB SERPL-MCNC: 1.5 MG/DL (ref 0–1.2)
BUN SERPL-MCNC: 14 MG/DL (ref 8–23)
BUN/CREAT SERPL: 14 (ref 7–25)
CALCIUM SPEC-SCNC: 8.7 MG/DL (ref 8.6–10.5)
CHLORIDE SERPL-SCNC: 99 MMOL/L (ref 98–107)
CO2 SERPL-SCNC: 28.8 MMOL/L (ref 22–29)
CREAT SERPL-MCNC: 1 MG/DL (ref 0.76–1.27)
DEPRECATED RDW RBC AUTO: 50.4 FL (ref 37–54)
EOSINOPHIL # BLD AUTO: 0.15 10*3/MM3 (ref 0–0.4)
EOSINOPHIL NFR BLD AUTO: 2.7 % (ref 0.3–6.2)
ERYTHROCYTE [DISTWIDTH] IN BLOOD BY AUTOMATED COUNT: 14.9 % (ref 12.3–15.4)
GFR SERPL CREATININE-BSD FRML MDRD: 74 ML/MIN/1.73
GLUCOSE SERPL-MCNC: 99 MG/DL (ref 65–99)
HCT VFR BLD AUTO: 32.7 % (ref 37.5–51)
HGB BLD-MCNC: 11 G/DL (ref 13–17.7)
IMM GRANULOCYTES # BLD AUTO: 0.05 10*3/MM3 (ref 0–0.05)
IMM GRANULOCYTES NFR BLD AUTO: 0.9 % (ref 0–0.5)
LYMPHOCYTES # BLD AUTO: 2.22 10*3/MM3 (ref 0.7–3.1)
LYMPHOCYTES NFR BLD AUTO: 39.6 % (ref 19.6–45.3)
MAGNESIUM SERPL-MCNC: 2.1 MG/DL (ref 1.6–2.4)
MCH RBC QN AUTO: 31.2 PG (ref 26.6–33)
MCHC RBC AUTO-ENTMCNC: 33.6 G/DL (ref 31.5–35.7)
MCV RBC AUTO: 92.6 FL (ref 79–97)
MONOCYTES # BLD AUTO: 0.46 10*3/MM3 (ref 0.1–0.9)
MONOCYTES NFR BLD AUTO: 8.2 % (ref 5–12)
NEUTROPHILS NFR BLD AUTO: 2.7 10*3/MM3 (ref 1.7–7)
NEUTROPHILS NFR BLD AUTO: 48.1 % (ref 42.7–76)
NRBC BLD AUTO-RTO: 0 /100 WBC (ref 0–0.2)
PHOSPHATE SERPL-MCNC: 3.2 MG/DL (ref 2.5–4.5)
PLATELET # BLD AUTO: 116 10*3/MM3 (ref 140–450)
PMV BLD AUTO: 8.9 FL (ref 6–12)
POTASSIUM SERPL-SCNC: 4.3 MMOL/L (ref 3.5–5.2)
PROT SERPL-MCNC: 5.8 G/DL (ref 6–8.5)
RBC # BLD AUTO: 3.53 10*6/MM3 (ref 4.14–5.8)
SARS-COV-2 RNA RESP QL NAA+PROBE: NOT DETECTED
SODIUM SERPL-SCNC: 134 MMOL/L (ref 136–145)
WBC # BLD AUTO: 5.61 10*3/MM3 (ref 3.4–10.8)

## 2021-11-04 PROCEDURE — 80076 HEPATIC FUNCTION PANEL: CPT | Performed by: FAMILY MEDICINE

## 2021-11-04 PROCEDURE — 84100 ASSAY OF PHOSPHORUS: CPT | Performed by: FAMILY MEDICINE

## 2021-11-04 PROCEDURE — 94799 UNLISTED PULMONARY SVC/PX: CPT

## 2021-11-04 PROCEDURE — 83735 ASSAY OF MAGNESIUM: CPT | Performed by: FAMILY MEDICINE

## 2021-11-04 PROCEDURE — 80048 BASIC METABOLIC PNL TOTAL CA: CPT | Performed by: FAMILY MEDICINE

## 2021-11-04 PROCEDURE — 99239 HOSP IP/OBS DSCHRG MGMT >30: CPT | Performed by: FAMILY MEDICINE

## 2021-11-04 PROCEDURE — 99232 SBSQ HOSP IP/OBS MODERATE 35: CPT | Performed by: STUDENT IN AN ORGANIZED HEALTH CARE EDUCATION/TRAINING PROGRAM

## 2021-11-04 PROCEDURE — 85025 COMPLETE CBC W/AUTO DIFF WBC: CPT | Performed by: FAMILY MEDICINE

## 2021-11-04 PROCEDURE — 97110 THERAPEUTIC EXERCISES: CPT

## 2021-11-04 PROCEDURE — U0005 INFEC AGEN DETEC AMPLI PROBE: HCPCS | Performed by: FAMILY MEDICINE

## 2021-11-04 PROCEDURE — 97161 PT EVAL LOW COMPLEX 20 MIN: CPT

## 2021-11-04 PROCEDURE — 94760 N-INVAS EAR/PLS OXIMETRY 1: CPT

## 2021-11-04 PROCEDURE — U0003 INFECTIOUS AGENT DETECTION BY NUCLEIC ACID (DNA OR RNA); SEVERE ACUTE RESPIRATORY SYNDROME CORONAVIRUS 2 (SARS-COV-2) (CORONAVIRUS DISEASE [COVID-19]), AMPLIFIED PROBE TECHNIQUE, MAKING USE OF HIGH THROUGHPUT TECHNOLOGIES AS DESCRIBED BY CMS-2020-01-R: HCPCS | Performed by: FAMILY MEDICINE

## 2021-11-04 PROCEDURE — 97165 OT EVAL LOW COMPLEX 30 MIN: CPT

## 2021-11-04 PROCEDURE — 25010000002 ENOXAPARIN PER 10 MG: Performed by: FAMILY MEDICINE

## 2021-11-04 RX ADMIN — ENOXAPARIN SODIUM 40 MG: 40 INJECTION SUBCUTANEOUS at 08:14

## 2021-11-04 RX ADMIN — DOCUSATE SODIUM 50MG AND SENNOSIDES 8.6MG 2 TABLET: 8.6; 5 TABLET, FILM COATED ORAL at 08:14

## 2021-11-04 RX ADMIN — METOPROLOL TARTRATE 50 MG: 50 TABLET, FILM COATED ORAL at 08:14

## 2021-11-04 NOTE — DISCHARGE SUMMARY
[facility]         HOSPITALIST  DISCHARGE SUMMARY    Patient Name: Cm Flores  : 1950  MRN: [id number]    Date of Admission: 11/3/2021  Date of Discharge:  2021    Primary Care Physician: Nancy Yeh DO    Consults     Date and Time Order Name Status Description    11/3/2021  5:31 PM Inpatient Orthopedic Surgery Consult      10/26/2021  6:06 PM IP General Consult (Use specialty-specific consult if known) Completed     10/26/2021  5:49 PM Orthopedics (on-call MD unless specified) Completed           Active and Resolved Hospital Problems:  Intractable left hip pain after fracture on previous replaced hip  Periprosthetic fracture around internal prosthetic left hip joint/greater trochanter fracture  Coronary artery disease  PAD  Tobacco abuse current  Depression  Hard of hearing  Dyslipidemia  Essential hypertension  Forgetfulness  Active Hospital Problems    Diagnosis POA   • Periprosthetic fracture around internal prosthetic left hip joint (HCC) [M97.02XA] Not Applicable      Resolved Hospital Problems   No resolved problems to display.       Hospital Course     Hospital Course:   70-year-old male with history of peripheral vascular disease, CAD, left hip replacement, was hospitalized on 11/3/2021 after having complication related to his left hip, suffering a left hip fracture on the same side he had a previous hip replacement, initially on presentation on 10/26/2021 given the complication he was referred [facility] a [facility] for orthopedic subspecialist, there is a misunderstanding about the surgical plan and the patient eventually left AGAINST MEDICAL ADVICE.  He followed up with Dr. Nash in the orthopedic clinic on 11/3/2021 where he was unable to walk due to intractable pain.  It was requested he be admitted for pain control and placement as he is not able to perform his activities of daily living in the outpatient setting without any support available in his  home.  He was hospitalized for further evaluation monitoring and management, orthopedic surgery was consulted, the patient was advised that his implant appeared to be stable and that he was to continue nonoperative management, nonweightbearing of his left lower extremity, no active hip abduction, and is to follow-up with orthopedics as outpatient.  He is to continue aspirin 325 mg daily for the next 4 weeks per orthopedics instructions.  Rehabilitation services were sought out and the patient accepted a bed at [facility], he was discharged in hemodynamically stable condition on 11/4/2021 sooner than expected or anticipated given his admitting condition, improving faster than anticipated.  The patient is to follow-up with orthopedic surgery in 2 weeks after discharge from rehab.      Day of Discharge     Vital Signs:  Temp:  [97 °F (36.1 °C)-98.6 °F (37 °C)] 98.1 °F (36.7 °C)  Heart Rate:  [67-81] 67  Resp:  [16-20] 16  BP: ()/(47-52) 99/47  Review of Systems:  All systems were reviewed and negative except for left hip pain, difficulty ambulating, intermittent nausea.    Physical exam:  Constitutional: Awake, alert, no acute distress              Eyes: Pupils equal, sclerae anicteric, no conjunctival injection              HENT: NCAT, mucous membranes moist              Neck: Supple, no thyromegaly, no lymphadenopathy, trachea midline              Respiratory: Clear to auscultation bilaterally, nonlabored respirations               Cardiovascular: RRR, no murmurs, rubs, or gallops, palpable pedal pulses bilaterally              Gastrointestinal: Positive bowel sounds, soft, nontender, nondistended              Musculoskeletal: Sitting in chair.  Pain to palpation of the left hip.  No deformity appreciated at this time.              Psychiatric: Appropriate affect, cooperative              Neurologic: Oriented x 3, strength symmetric in all extremities except in left hip/leg, Cranial Nerves grossly intact to  confrontation, speech clear              Skin: No rashes       Discharge Details        Discharge Medications      Continue These Medications      Instructions Start Date   ALPRAZolam 0.25 MG tablet  Commonly known as: XANAX   0.25 mg, Oral, Nightly PRN      cetirizine 10 MG tablet  Commonly known as: zyrTEC   10 mg, Oral, Daily      metoprolol succinate XL 25 MG 24 hr tablet  Commonly known as: TOPROL-XL   25 mg, Oral, 2 Times Daily      promethazine-codeine 6.25-10 MG/5ML solution  Commonly known as: PHENERGAN with CODEINE   5 mL, Oral, Every 4 Hours PRN      rosuvastatin 5 MG tablet  Commonly known as: CRESTOR   5 mg, Oral, Every Night at Bedtime             Allergies   Allergen Reactions   • Azithromycin Unknown - High Severity     zpak   • Morphine Unknown - Low Severity   • Penicillins Unknown - Low Severity   • Sulfa Antibiotics Unknown - Low Severity       Discharge Disposition:  Rehab Facility or Unit (DC - External)    Diet:  Hospital:  Diet Order   Procedures   • Diet Regular        Discharge Activity: non weight bearing left leg,  no active hip abduction      CODE STATUS:  Code Status and Medical Interventions:   Ordered at: 11/03/21 1718     Code Status (Patient has no pulse and is not breathing):    CPR (Attempt to Resuscitate)     Medical Interventions (Patient has pulse or is breathing):    Full Support         Future Appointments   Date Time Provider Department Center   11/17/2021 11:00 AM Maurisio Nash MD [facility]   1/10/2022  9:45 AM Nancy Yeh DO [facility]       Additional Instructions for the Follow-ups that You Need to Schedule     Discharge Follow-up with PCP   As directed       Currently Documented PCP:    Nancy Yeh DO    PCP Phone Number:    [phone]     Follow Up Details: 3 to 7 days         Discharge Follow-up with Specified Provider: Dr Nash; 2 Weeks   As directed      To: Dr Nash    Follow Up: 2 Weeks               Pertinent  and/or Most Recent Results      PROCEDURES:   XR Chest 1 View    Result Date: 10/26/2021  PROCEDURE: XR CHEST 1 VW  COMPARISON: [facility], CT, CHEST W/O CONTRAST, 12/18/2019, 20:08.  [facility], CR, CHEST AP/PA 1 VIEW, 12/18/2019, 19:06.  INDICATIONS: preop  FINDINGS:  Stable 2.2 cm mid left lung nodule, found to be a hamartoma on previous CT.  There is chronic blunting of the left lateral costophrenic angle.  There are minimal lateral right basilar airspace opacities.  Right skin fold is noted without evidence of pneumothorax.  Changes are redemonstrated from CABG.  Heart size is not enlarged.  CONCLUSION: Mild right lateral basilar airspace opacities, which may be due to atelectasis or less likely pneumonia.       MILADYS PATRICK MD       Electronically Signed and Approved By: MILADYS PATRICK MD on 10/26/2021 at 19:29             XR Hip With or Without Pelvis 2 - 3 View Left    Result Date: 11/3/2021  Indications: Follow-up left periprosthetic hip fracture Views: AP pelvis, AP and frog lateral left hip Findings: Press-fit left total hip arthroplasty implants in place.  There is a periprosthetic fracture through the greater trochanter.  There is an oblique fracture line at the level of the lesser trochanter.  A separate displaced greater trochanteric fragment has retracted proximally.  The hip remains reduced.  No obvious subsidence of the stem.  No obvious loosening. Comparative Data: Comparative data found and reviewed today     XR Hip With or Without Pelvis 2 - 3 View Left    Result Date: 10/26/2021  PROCEDURE: XR HIP W OR WO PELVIS 2-3 VIEW LEFT  COMPARISON: [facility], CT, CTA ABDOMEN PELVIS W/WO CONTRAST, 12/10/2015, 8:24.  INDICATIONS: Fall  FINDINGS:  There are postoperative changes from left hip arthroplasty.  Linear lucency through the lateral portion of the intratrochanteric region is new compared to 2015 CT.  He no additional findings concerning for fracture are identified.  Round  pelvic calcification is likely a phlebolith.   CONCLUSION:  1. Lucency through the lateral intertrochanteric proximal left femur, concerning for nondisplaced fracture. 2. Changes of left hip arthroplasty.      MILADYS PATRICK MD       Electronically Signed and Approved By: MILADYS PATRICK MD on 10/26/2021 at 17:41               LAB RESULTS:      Lab 11/04/21  0505 11/03/21  1747   WBC 5.61 7.22   HEMOGLOBIN 11.0* 12.5*   HEMATOCRIT 32.7* 38.1   PLATELETS 116* 159   NEUTROS ABS 2.70 3.87   IMMATURE GRANS (ABS) 0.05 0.06*   LYMPHS ABS 2.22 2.55   MONOS ABS 0.46 0.56   EOS ABS 0.15 0.15   MCV 92.6 92.7         Lab 11/04/21  0505 11/03/21  1747   SODIUM 134* 134*   POTASSIUM 4.3 4.2   CHLORIDE 99 94*   CO2 28.8 27.9   ANION GAP 6.2 12.1   BUN 14 14   CREATININE 1.00 0.96   GLUCOSE 99 102*   CALCIUM 8.7 8.9   MAGNESIUM 2.1  --    PHOSPHORUS 3.2  --          Lab 11/04/21  0505 11/03/21  1747   TOTAL PROTEIN 5.8* 7.0   ALBUMIN 3.50 4.00   GLOBULIN  --  3.0   ALT (SGPT) <5 5   AST (SGOT) 11 13   BILIRUBIN 1.5* 1.7*   INDIRECT BILIRUBIN 1.2  --    BILIRUBIN DIRECT 0.3  --    ALK PHOS 58 67                     Brief Urine Lab Results     None        Microbiology Results (last 10 days)     Procedure Component Value - Date/Time    COVID PRE-OP / PRE-PROCEDURE SCREENING ORDER (NO ISOLATION) - Swab, Nasopharynx [[id number]]  (Normal) Collected: 11/04/21 1056    Lab Status: Final result Specimen: Swab from Nasopharynx Updated: 11/04/21 1201    Narrative:      The following orders were created for panel order COVID PRE-OP / PRE-PROCEDURE SCREENING ORDER (NO ISOLATION) - Swab, Nasopharynx.  Procedure                               Abnormality         Status                     ---------                               -----------         ------                     COVID-19,CEPHEID/TOSHIA/BD...[[id number]]  Normal              Final result                 Please view results for these tests on the individual orders.     COVID-19,CEPHEID/TOSHIA/BDMAX,COR/JIHAN/PAD/ALEJANDRA IN-HOUSE(OR EMERGENT/ADD-ON),NP SWAB IN TRANSPORT MEDIA 3-4 HR TAT, RT-PCR - Swab, Nasopharynx [[id number]]  (Normal) Collected: 11/04/21 1056    Lab Status: Final result Specimen: Swab from Nasopharynx Updated: 11/04/21 1201     COVID19 Not Detected    Narrative:      Fact sheet for providers: https://www.fda.gov/media/319272/download     Fact sheet for patients: https://www.fda.gov/media/350511/download  Fact sheet for providers: https://www.fda.gov/media/815504/download     Fact sheet for patients: https://www.fda.gov/media/584174/download                           Labs Pending at Discharge: None        Time spent on Discharge including face to face service:  35 minutes    Electronically signed by Aric Grullon MD, 11/04/21, 12:48 PM EDT.

## 2021-11-04 NOTE — PLAN OF CARE
Goal Outcome Evaluation:  Plan of Care Reviewed With: (P) patient        Progress: (P) improving  Outcome Summary: (P) Pt presents with deficits in range of motion and strength in the left lower extremity, especially in the hip. Pt also presents with deficits in transfers, bed mobility, and ambulation. He requires heavy cueing and physical assist to maintain non-weight bearing status. Skilled PT services recommended to address these impairments and improve functional mobility.   Problem: Falls - Risk of:  Goal: Will remain free from falls  Outcome: Ongoing  Goal: Absence of physical injury  Outcome: Ongoing     Problem: Infection:  Goal: Will remain free from infection  Outcome: Ongoing     Problem: Safety:  Goal: Free from accidental physical injury  Outcome: Ongoing  Goal: Free from intentional harm  Outcome: Ongoing     Problem: Daily Care:  Goal: Daily care needs are met  Outcome: Ongoing     Problem: Pain:  Goal: Patient's pain/discomfort is manageable  Outcome: Ongoing  Goal: Pain level will decrease  Outcome: Ongoing  Goal: Control of acute pain  Outcome: Ongoing  Goal: Control of chronic pain  Outcome: Ongoing     Problem: Skin Integrity:  Goal: Skin integrity will stabilize  Outcome: Ongoing     Problem: Discharge Planning:  Goal: Patients continuum of care needs are met  Outcome: Ongoing     Problem: Airway Clearance - Ineffective  Goal: Achieve or maintain patent airway  Outcome: Ongoing     Problem: Gas Exchange - Impaired  Goal: Absence of hypoxia  Outcome: Ongoing  Goal: Promote optimal lung function  Outcome: Ongoing     Problem: Breathing Pattern - Ineffective  Goal: Ability to achieve and maintain a regular respiratory rate  Outcome: Ongoing     Problem:  Body Temperature -  Risk of, Imbalanced  Goal: Ability to maintain a body temperature within defined limits  Outcome: Ongoing  Goal: Will regain or maintain usual level of consciousness  Outcome: Ongoing  Goal: Complications related to the disease process, condition or treatment will be avoided or minimized  Outcome: Ongoing     Problem: Isolation Precautions - Risk of Spread of Infection  Goal: Prevent transmission of infection  Outcome: Ongoing     Problem: Nutrition Deficits  Goal: Optimize nutritional status  Outcome: Ongoing     Problem: Risk for Fluid Volume Deficit  Goal: Maintain normal heart rhythm  Outcome: Ongoing  Goal: Maintain absence of muscle cramping  Outcome: Ongoing  Goal: Maintain normal serum potassium, sodium, calcium, phosphorus, and pH  Outcome: Ongoing     Problem: Loneliness or Risk for Loneliness  Goal: Demonstrate positive use of time alone when socialization is not possible  Outcome: Ongoing     Problem: Fatigue  Goal: Verbalize increase energy and improved vitality  Outcome: Ongoing     Problem: Patient Education: Go to Patient Education Activity  Goal: Patient/Family Education  Outcome: Ongoing     Problem: Skin Integrity:  Goal: Will show no infection signs and symptoms  Outcome: Ongoing  Goal: Absence of new skin breakdown  Outcome: Ongoing

## 2021-11-04 NOTE — PROGRESS NOTES
[facility]     Progress Note    Patient Name: Cm Flores  : 1950  MRN: [id number]  Primary Care Physician:  Nancy Yeh DO  Date of admission: 11/3/2021    Subjective   Subjective     HPI:  Pain controlled overnight.  No new issues per the patient.  Denies distal numbness.  Denies chest pain.    Review of Systems   See HPI    Objective   Objective     Vitals:   Temp:  [97 °F (36.1 °C)-98.6 °F (37 °C)] 97.9 °F (36.6 °C)  Heart Rate:  [61-81] 73  Resp:  [18-20] 20  BP: ()/(47-52) 97/52  Physical Exam    General: Alert, no acute distress   Lower extremity: Tender to palpation over the greater trochanter.  No pain with passive logroll of the left hip.  Thigh and calf soft.  Sensation intact over the foot.  Demonstrates active ankle plantarflexion and dorsiflexion.  Less than 2-second capillary refill distally.    Result Review      WBC   Date Value Ref Range Status   2021 5.61 3.40 - 10.80 10*3/mm3 Final     RBC   Date Value Ref Range Status   2021 3.53 (L) 4.14 - 5.80 10*6/mm3 Final     Hemoglobin   Date Value Ref Range Status   2021 11.0 (L) 13.0 - 17.7 g/dL Final     Hematocrit   Date Value Ref Range Status   2021 32.7 (L) 37.5 - 51.0 % Final     MCV   Date Value Ref Range Status   2021 92.6 79.0 - 97.0 fL Final     MCH   Date Value Ref Range Status   2021 31.2 26.6 - 33.0 pg Final     MCHC   Date Value Ref Range Status   2021 33.6 31.5 - 35.7 g/dL Final     RDW   Date Value Ref Range Status   2021 14.9 12.3 - 15.4 % Final     RDW-SD   Date Value Ref Range Status   2021 50.4 37.0 - 54.0 fl Final     MPV   Date Value Ref Range Status   2021 8.9 6.0 - 12.0 fL Final     Platelets   Date Value Ref Range Status   2021 116 (L) 140 - 450 10*3/mm3 Final     Neutrophil %   Date Value Ref Range Status   2021 48.1 42.7 - 76.0 % Final     Lymphocyte %   Date Value Ref Range Status   2021 39.6 19.6 - 45.3 % Final     Monocyte %    Date Value Ref Range Status   11/04/2021 8.2 5.0 - 12.0 % Final     Eosinophil %   Date Value Ref Range Status   11/04/2021 2.7 0.3 - 6.2 % Final     Basophil %   Date Value Ref Range Status   11/04/2021 0.5 0.0 - 1.5 % Final     Immature Grans %   Date Value Ref Range Status   11/04/2021 0.9 (H) 0.0 - 0.5 % Final     Neutrophils, Absolute   Date Value Ref Range Status   11/04/2021 2.70 1.70 - 7.00 10*3/mm3 Final     Lymphocytes, Absolute   Date Value Ref Range Status   11/04/2021 2.22 0.70 - 3.10 10*3/mm3 Final     Monocytes, Absolute   Date Value Ref Range Status   11/04/2021 0.46 0.10 - 0.90 10*3/mm3 Final     Eosinophils, Absolute   Date Value Ref Range Status   11/04/2021 0.15 0.00 - 0.40 10*3/mm3 Final     Basophils, Absolute   Date Value Ref Range Status   11/04/2021 0.03 0.00 - 0.20 10*3/mm3 Final     Immature Grans, Absolute   Date Value Ref Range Status   11/04/2021 0.05 0.00 - 0.05 10*3/mm3 Final     nRBC   Date Value Ref Range Status   11/04/2021 0.0 0.0 - 0.2 /100 WBC Final        Result Review:  I have personally reviewed the results from the time of this admission to 11/4/2021 07:05 EDT and agree with these findings:  [x]  Laboratory  []  Microbiology  []  Radiology  []  EKG/Telemetry   []  Cardiology/Vascular   []  Pathology  []  Old records  []  Other:      Assessment/Plan   Assessment / Plan     Brief Patient Summary:  Cm Flores is a 70 y.o. male with a left periprosthetic greater trochanter fracture.  Implant appears stable at this time.    Active Hospital Problems:  Active Hospital Problems    Diagnosis    • Periprosthetic fracture around internal prosthetic left hip joint (HCC)      Plan:     Continue nonoperative management  Nonweightbearing left lower extremity, no active hip abduction  PT/OT  Pain control  DVT prophylaxis: Lovenox  Rehab placement  Further care per primary team, appreciate assistance       DVT prophylaxis:  Medical DVT prophylaxis orders are present.    CODE STATUS:    Code Status (Patient has no pulse and is not breathing): CPR (Attempt to Resuscitate)  Medical Interventions (Patient has pulse or is breathing): Full Support        Electronically signed by Maurisio Nash MD, 11/04/21, 7:05 AM EDT.

## 2021-11-04 NOTE — PLAN OF CARE
Goal Outcome Evaluation:  Plan of Care Reviewed With: patient        Progress: no change  Outcome Summary: pt. resting quietly, no c/o's.

## 2021-11-04 NOTE — CASE MANAGEMENT/SOCIAL WORK
Discharge Planning Assessment   [facility]     Patient Name: Cm Flores  MRN: [id number]  Today's Date: 11/4/2021    Admit Date: 11/3/2021     Discharge Needs Assessment     Row Name 11/04/21 1030       Living Environment    Lives With alone    Current Living Arrangements home/apartment/condo    Primary Care Provided by self    Family Caregiver if Needed other relative(s)    Quality of Family Relationships helpful; involved; supportive    Able to Return to Prior Arrangements yes    Living Arrangement Comments Patient lives alone at home. Patient reported that his relatives lives close. But they are not able to assist him much.       Resource/Environmental Concerns    Resource/Environmental Concerns none       Transition Planning    Patient/Family Anticipates Transition to inpatient rehabilitation facility    Patient/Family Anticipated Services at Transition skilled nursing    Transportation Anticipated family or friend will provide       Discharge Needs Assessment    Readmission Within the Last 30 Days lack of support    Equipment Currently Used at Home cane, quad; commode; walker, rolling    Anticipated Changes Related to Illness inability to care for self    Equipment Needed After Discharge none    Discharge Facility/Level of Care Needs nursing facility, basic; rehabilitation facility; nursing facility, intermediate; nursing facility, skilled    Discharge Coordination/Progress Patient with peripheral vascular disease, coronary artery disease, and left hip replacement. Patient admitted to the hospital due to intractable left hip pain. Patient is alert and oriented. MATTHIAS discussed discharge plan with patient. Patient reported that he was sent here to stay for 3 might nights so that he can go to [facility]. SW discussed with patient in detail about insurance criteria and qualification. Patient reported that he wants to go to [facility]. SW sent rehab referral to [facility]. [facility] is able to take  patient under Medicare waiver. [facility] also approved patient. Patient reported that he prefers to go to [facility]. Patient reported that he relative will be transporting him to rehab. SW notified MD about patient bed.               Discharge Plan     Row Name 11/04/21 1049       Plan    Plan Comments Patient needs a negative COVID test.    Final Discharge Disposition Code 03 - skilled nursing facility (SNF)    Final Note [facility].              Continued Care and Services - Admitted Since 11/3/2021     Destination     Service Provider Request Status Selected Services Address Phone Fax Patient Preferred    [facility] [place]  Pending - Request Sent N/A [address] [place] [id number] [phone] --    [facility]  Pending - Request Sent N/A [address] [facility] [address] [address] [facility] [address] [phone] --                 Demographic Summary     Row Name 11/04/21 1021       General Information    Admission Type inpatient    Arrived From home    Referral Source admission list    Reason for Consult discharge planning    Preferred Language English     Used During This Interaction no       Contact Information    Permission Granted to Share Info With                Functional Status     Row Name 11/04/21 1028       Functional Status    Usual Activity Tolerance excellent    Current Activity Tolerance excellent       Functional Status, IADL    Medications independent    Meal Preparation independent    Housekeeping independent    Laundry independent    Shopping independent       Mental Status    General Appearance WDL WDL       Mental Status Summary    Recent Changes in Mental Status/Cognitive Functioning no changes       Employment/    Employment Status retired               Psychosocial    No documentation.                Abuse/Neglect    No documentation.                Legal     Row Name 11/04/21 1030       Legal     Criminal Activity/Legal Involvement none               Substance Abuse    No documentation.                Patient Forms    No documentation.                   ROCCO Mccormick

## 2021-11-04 NOTE — THERAPY EVALUATION
Patient Name: Cm Flores  : 1950    MRN: [id number]                              Today's Date: 2021       Admit Date: 11/3/2021    Visit Dx:     ICD-10-CM ICD-9-CM   1. Difficulty in walking  R26.2 719.7   2. Decreased activities of daily living (ADL)  Z78.9 V49.89     Patient Active Problem List   Diagnosis   • History of bladder cancer   • Essential hypertension   • Mixed hyperlipidemia   • Primary insomnia   • Tobacco abuse   • Periprosthetic fracture around internal prosthetic left hip joint (HCC)     Past Medical History:   Diagnosis Date   • Anxiety    • Arthritis    • Bladder cancer (HCC)    • Bladder cancer (HCC)     lining of bladder    • Broken bones      as a child    • CAD (coronary artery disease) 2014   • Calculus of kidney    • Cataracts, both eyes    • CHF (congestive heart failure) (HCC)    • Constipation    • Deafness    • Depression    • Essential hypertension 2021   • Forgetfulness    • Gallstones    • Head injury    • Heart disease    • Hemorrhoids    • Hernia of pelvic floor    • High cholesterol    • High cholesterol    • History of bladder cancer 2021   • History of heart attack    • Hypertension    • Kidney stones    • Mixed hyperlipidemia 2021   • Primary insomnia 2021   • Shortness of breath    • Sinus trouble    • Skin disease     UNSURE WHAT KIND/ARMS   • Tobacco abuse 2021     Past Surgical History:   Procedure Laterality Date   • ABDOMINAL AORTIC ANEURYSM REPAIR     • CAROTID ENDARTERECTOMY Right 2014    Dr. Boudreaux   • CHOLECYSTECTOMY     • CHOLECYSTECTOMY OPEN     • CHOLECYSTECTOMY OPEN     • CORONARY ARTERY BYPASS GRAFT     • CYSTOSCOPY      Cystoscopy, Bladder BX ; Cysto, Urethral Dilattion, Bilateral Retrogrades, Biopsy & Fulguration, Left Ureteroscopy 2013   • CYSTOSCOPY RETROGRADE PYELOGRAM Bilateral    • EYE SURGERY     • KNEE SURGERY Left     Repair   • TOTAL HIP ARTHROPLASTY Left       General Information     Row  Name 11/04/21 1131          OT Time and Intention    Document Type evaluation  -AV     Mode of Treatment individual therapy; occupational therapy  -AV     Row Name 11/04/21 1131          General Information    Patient Profile Reviewed yes  -AV     Prior Level of Function independent:; ADL's; home management; all household mobility  Patient reports prior to his fall/fracture on October 26, he was (I).  Sits to bathe/tub with seat & hand-held nozzle.  Stand at sink to groom.  Elevated commode.  Ambulates without assistive device.  No home oxygen.  -AV     Existing Precautions/Restrictions fall; non-weight bearing  Nonweightbearing left lower extremity x6 weeks.  No active abduction left lower extremity  -AV     Barriers to Rehab none identified  -AV     Row Name 11/04/21 1131          Occupational Profile    Reason for Services/Referral (Occupational Profile) Patient is a 70 year old male admitted to [facility] on November 3, 2021 with intractable left hip pain.  Non-operative management per orthopedic surgeon.  OT consulted due to recent decline in ADL and transfer independence.  No previous OT services for current condition.  -AV     Row Name 11/04/21 1131          Living Environment    Lives With alone  -AV     Row Name 11/04/21 1131          Home Main Entrance    Number of Stairs, Main Entrance none  -AV     Row Name 11/04/21 1131          Cognition    Orientation Status (Cognition) --  Patient is alert, pleasant and cooperative- able to retain information and follow commands.  Voiced awareness of nonweightbearing status/no abduction  -AV     Row Name 11/04/21 1131          Safety Issues, Functional Mobility    Impairments Affecting Function (Mobility) balance; cognition; endurance/activity tolerance  -AV           User Key  (r) = Recorded By, (t) = Taken By, (c) = Cosigned By    Initials Name Provider Type    Andriy Valdez, OT Occupational Therapist                 Mobility/ADL's     Row Name  11/04/21 1135          Transfers    Transfers --  Assistance of 1/RW-refer to PT  -AV     Row Name 11/04/21 1135          Activities of Daily Living    BADL Assessment/Intervention --  (I) feeding, grooming, UB self-care and use of urinal while seated.  Mod assist LB bathing/dressing with set up/cues.  No bowel movement during hospitalization.  -AV     Row Name 11/04/21 1135          Mobility    Extremity Weight-bearing Status left lower extremity  -AV     Left Lower Extremity (Weight-bearing Status) non weight-bearing (NWB)  -AV           User Key  (r) = Recorded By, (t) = Taken By, (c) = Cosigned By    Initials Name Provider Type    Andriy Valdez OT Occupational Therapist               Obj/Interventions     Row Name 11/04/21 1136          Sensory Assessment (Somatosensory)    Sensory Assessment (Somatosensory) UE sensation intact  -AV     Row Name 11/04/21 1136          Vision Assessment/Intervention    Visual Impairment/Limitations WFL; corrective lenses full-time  -AV     Row Name 11/04/21 1136          Range of Motion Comprehensive    General Range of Motion bilateral upper extremity ROM WFL  -AV     Row Name 11/04/21 1136          Strength Comprehensive (MMT)    Comment, General Manual Muscle Testing (MMT) Assessment 4+/5 upper extremities  -AV     Row Name 11/04/21 1136          Motor Skills    Motor Skills coordination; functional endurance  -AV     Coordination WFL  Right dominant  -AV     Functional Endurance Fair minus  -AV     Row Name 11/04/21 1136          Balance    Balance Assessment standing dynamic balance  Refer to PT  -AV           User Key  (r) = Recorded By, (t) = Taken By, (c) = Cosigned By    Initials Name Provider Type    Andriy Valdez OT Occupational Therapist               Goals/Plan     Row Name 11/04/21 1138          Transfer Goal 1 (OT)    Activity/Assistive Device (Transfer Goal 1, OT) transfers, all; walker, rolling  -AV     [place] Level/Cues Needed (Transfer Goal 1,  OT) modified independence  -AV     Time Frame (Transfer Goal 1, OT) long term goal (LTG); 10 days  -AV     Row Name 11/04/21 1138          Bathing Goal 1 (OT)    Activity/Device (Bathing Goal 1, OT) bathing skills, all  -AV     [place] Level/Cues Needed (Bathing Goal 1, OT) modified independence  -AV     Time Frame (Bathing Goal 1, OT) long term goal (LTG); 10 days  -AV     Row Name 11/04/21 1138          Dressing Goal 1 (OT)    Activity/Device (Dressing Goal 1, OT) dressing skills, all  -AV     [place]/Cues Needed (Dressing Goal 1, OT) modified independence  -AV     Time Frame (Dressing Goal 1, OT) long term goal (LTG); 10 days  -AV     Row Name 11/04/21 1138          Toileting Goal 1 (OT)    Activity/Device (Toileting Goal 1, OT) toileting skills, all; raised toilet seat  -AV     [place] Level/Cues Needed (Toileting Goal 1, OT) modified independence  -AV     Time Frame (Toileting Goal 1, OT) long term goal (LTG); 10 days  -AV     Row Name 11/04/21 1138          Grooming Goal 1 (OT)    Activity/Device (Grooming Goal 1, OT) grooming skills, all  -AV     [place] (Grooming Goal 1, OT) modified independence  -AV     Time Frame (Grooming Goal 1, OT) long term goal (LTG); 10 days  -AV     Row Name 11/04/21 1138          Problem Specific Goal 1 (OT)    Problem Specific Goal 1 (OT) Patient will demonstrate fair plus/good endurance/activity tolerance needed to support ADLs.  -AV     Time Frame (Problem Specific Goal 1, OT) long term goal (LTG); 10 days  -AV     Row Name 11/04/21 1138          Therapy Assessment/Plan (OT)    Planned Therapy Interventions (OT) activity tolerance training; BADL retraining; adaptive equipment training; functional balance retraining; IADL retraining; occupation/activity based interventions; patient/caregiver education/training; transfer/mobility retraining  -AV           User Key  (r) = Recorded By, (t) = Taken By, (c) = Cosigned By    Initials Name Provider Type    AV  Andriy Little, KATHRYN Occupational Therapist               Clinical Impression     Row Name 11/04/21 1137          Pain Assessment    Additional Documentation Pain Scale: FACES Pre/Post-Treatment (Group)  -AV     Row Name 11/04/21 1137          Pain Scale: FACES Pre/Post-Treatment    Pain: FACES Scale, Pretreatment 2-->hurts little bit  -AV     Posttreatment Pain Rating 2-->hurts little bit  -AV     Pain Location - Side Left  -AV     Pain Location - Orientation lower  -AV     Pain Location extremity  -AV     Row Name 11/04/21 1137          Plan of Care Review    Plan of Care Reviewed With patient  -AV     Progress no change  First session for evaluation  -AV     Outcome Summary Patient presents with limitations of balance, endurance/activity tolerance, safety awareness related to weightbearing and movement restrictions which impede his ability to perform ADL and transfers as prior.  The skills of a therapist will be required to safely and effectively implement treatment plan to restore maximal level of function.  -AV     Row Name 11/04/21 1137          Therapy Assessment/Plan (OT)    Patient/Family Therapy Goal Statement (OT) Maximize independence  -AV     Rehab Potential (OT) good, to achieve stated therapy goals  -AV     Criteria for Skilled Therapeutic Interventions Met (OT) yes; meets criteria; skilled treatment is necessary  -AV     Therapy Frequency (OT) 5 times/wk  -AV     Row Name 11/04/21 1137          Therapy Plan Review/Discharge Plan (OT)    Equipment Needs Upon Discharge (OT) walker, rolling; tub bench  -AV     Anticipated Discharge Disposition (OT) inpatient rehabilitation facility; sub acute care setting  -AV     Row Name 11/04/21 1137          Vital Signs    O2 Delivery Pre Treatment room air  -AV     O2 Delivery Intra Treatment room air  -AV     O2 Delivery Post Treatment room air  -AV           User Key  (r) = Recorded By, (t) = Taken By, (c) = Cosigned By    Initials Name Provider Type    JACK Little  Andriy, OT Occupational Therapist               Outcome Measures     Row Name 11/04/21 1139          How much help from another is currently needed...    Putting on and taking off regular lower body clothing? 2  -AV     Bathing (including washing, rinsing, and drying) 2  -AV     Toileting (which includes using toilet bed pan or urinal) 2  -AV     Putting on and taking off regular upper body clothing 4  -AV     Taking care of personal grooming (such as brushing teeth) 4  -AV     Eating meals 4  -AV     AM-PAC 6 Clicks Score (OT) 18  -AV     Row Name 11/04/21 0957 11/04/21 0815       How much help from another person do you currently need...    Turning from your back to your side while in flat bed without using bedrails? 4 (P)   - 3  -CG    Moving from lying on back to sitting on the side of a flat bed without bedrails? 3 (P)   - 3  -CG    Moving to and from a bed to a chair (including a wheelchair)? 3 (P)   - 3  -CG    Standing up from a chair using your arms (e.g., wheelchair, bedside chair)? 2 (P)   - 3  -CG    Climbing 3-5 steps with a railing? -- (P)   - 2  -CG    To walk in hospital room? 2 (P)   - 2  -CG    AM-PAC 6 Clicks Score (PT) -- (P)   - 16  -CG    Row Name 11/04/21 1139 11/04/21 0957       Functional Assessment    Outcome Measure Options AM-PAC 6 Clicks Daily Activity (OT); Optimal Instrument  -AV AM-PAC 6 Clicks Basic Mobility (PT) (P)   -    Row Name 11/04/21 1139          Optimal Instrument    Optimal Instrument Optimal - 3  -AV     Bending/Stooping 3  -AV     Standing 2  -AV     Reaching 1  -AV     From the list, choose the 3 activities you would most like to be able to do without any difficulty Bending/stooping; Standing; Reaching  -AV     Total Score Optimal - 3 6  -AV           User Key  (r) = Recorded By, (t) = Taken By, (c) = Cosigned By    Initials Name Provider Type    CG Arik Holcomb, RN Registered Nurse    Andriy Valdez, OT Occupational Therapist     Jose Carlos  Jean Marie PT Student PT Student                  OT Recommendation and Plan  Planned Therapy Interventions (OT): activity tolerance training, BADL retraining, adaptive equipment training, functional balance retraining, IADL retraining, occupation/activity based interventions, patient/caregiver education/training, transfer/mobility retraining  Therapy Frequency (OT): 5 times/wk  Plan of Care Review  Plan of Care Reviewed With: patient  Progress: no change (First session for evaluation)  Outcome Summary: Patient presents with limitations of balance, endurance/activity tolerance, safety awareness related to weightbearing and movement restrictions which impede his ability to perform ADL and transfers as prior.  The skills of a therapist will be required to safely and effectively implement treatment plan to restore maximal level of function.     Time Calculation:    Time Calculation- OT     Row Name 11/04/21 1142             Time Calculation- OT    OT Received On 11/04/21  -AV      OT Goal Re-Cert Due Date 11/13/21  -AV              Untimed Charges    OT Eval/Re-eval Minutes 35  -AV              Total Minutes    Untimed Charges Total Minutes 35  -AV       Total Minutes 35  -AV            User Key  (r) = Recorded By, (t) = Taken By, (c) = Cosigned By    Initials Name Provider Type    AV Andriy Little OT Occupational Therapist              Therapy Charges for Today     Code Description Service Date Service Provider Modifiers Qty    [id number] HC OT EVAL LOW COMPLEXITY 3 11/4/2021 Andriy Little OT GO 1               Andriy Little OT  11/4/2021

## 2021-11-04 NOTE — PLAN OF CARE
Goal Outcome Evaluation:  Plan of Care Reviewed With: patient        Progress: no change (First session for evaluation)  Outcome Summary: Patient presents with limitations of balance, endurance/activity tolerance, safety awareness related to weightbearing and movement restrictions which impede his ability to perform ADL and transfers as prior.  The skills of a therapist will be required to safely and effectively implement treatment plan to restore maximal level of function.

## 2021-11-04 NOTE — THERAPY EVALUATION
Acute Care - Physical Therapy Initial Evaluation  SOURAV Zheng     Patient Name: Cm Flores  : 1950  MRN: [id number]  Today's Date: 2021\  Admit date: 11/3/2021     Referring Physician: Aric Grullon MD     Surgery Date:* No surgery found *              Visit Dx:     ICD-10-CM ICD-9-CM   1. Difficulty in walking  R26.2 719.7     Patient Active Problem List   Diagnosis   • History of bladder cancer   • Essential hypertension   • Mixed hyperlipidemia   • Primary insomnia   • Tobacco abuse   • Periprosthetic fracture around internal prosthetic left hip joint (HCC)     Past Medical History:   Diagnosis Date   • Anxiety    • Arthritis    • Bladder cancer (HCC)    • Bladder cancer (HCC)     lining of bladder    • Broken bones      as a child    • CAD (coronary artery disease) 2014   • Calculus of kidney    • Cataracts, both eyes    • CHF (congestive heart failure) (HCC)    • Constipation    • Deafness    • Depression    • Essential hypertension 2021   • Forgetfulness    • Gallstones    • Head injury    • Heart disease    • Hemorrhoids    • Hernia of pelvic floor    • High cholesterol    • High cholesterol    • History of bladder cancer 2021   • History of heart attack    • Hypertension    • Kidney stones    • Mixed hyperlipidemia 2021   • Primary insomnia 2021   • Shortness of breath    • Sinus trouble    • Skin disease     UNSURE WHAT KIND/ARMS   • Tobacco abuse 2021     Past Surgical History:   Procedure Laterality Date   • ABDOMINAL AORTIC ANEURYSM REPAIR     • CAROTID ENDARTERECTOMY Right 2014    Dr. Boudreaux   • CHOLECYSTECTOMY     • CHOLECYSTECTOMY OPEN     • CHOLECYSTECTOMY OPEN     • CORONARY ARTERY BYPASS GRAFT     • CYSTOSCOPY      Cystoscopy, Bladder BX ; Cysto, Urethral Dilattion, Bilateral Retrogrades, Biopsy & Fulguration, Left Ureteroscopy 2013   • CYSTOSCOPY RETROGRADE PYELOGRAM Bilateral    • EYE SURGERY     • KNEE SURGERY Left     Repair   •  TOTAL HIP ARTHROPLASTY Left      PT Assessment (last 12 hours)     PT Evaluation and Treatment     Row Name 11/04/21 0948          Physical Therapy Time and Intention    Subjective Information complains of; pain (P)   -     Document Type evaluation (P)   -     Mode of Treatment individual therapy; physical therapy (P)   -     Patient Effort good (P)   -     Symptoms Noted During/After Treatment increased pain (P)   -     Row Name 11/04/21 0948          General Information    Patient Observations alert; cooperative; agree to therapy (P)   -     Prior Level of Function independent:; all household mobility (P)   -     Equipment Currently Used at Home wheelchair (P)   -     Existing Precautions/Restrictions fall; hip (P)   -     Row Name 11/04/21 0948          Living Environment    Current Living Arrangements home/apartment/condo (P)   -     Lives With alone; other (see comments) (P)   states his cousin lives right next door and has been taking care of him  -     Row Name 11/04/21 0948          Home Use of Assistive/Adaptive Equipment    Equipment Currently Used at Home wheelchair (P)   -     Row Name 11/04/21 0948          Range of Motion (ROM)    Range of Motion right lower extremity; ROM is WFL; left lower extremity (P)   grossly limited hip flexion and knee extension  -     Row Name 11/04/21 0948          Strength (Manual Muscle Testing)    Strength (Manual Muscle Testing) right lower extremity; strength is WFL; left lower extremity (P)   2-/5  -     Row Name 11/04/21 0948          Mobility    Extremity Weight-bearing Status left lower extremity (P)   -     Left Lower Extremity (Weight-bearing Status) non weight-bearing (NWB); other (see comments) (P)   no active abduction  -     Row Name 11/04/21 0948          Bed Mobility    Bed Mobility bed mobility (all) activities (P)   -     All Activities, [place] (Bed Mobility) minimum assist (75% patient effort) (P)   -     Bed  Mobility, Safety Issues decreased use of legs for bridging/pushing; impaired trunk control for bed mobility (P)   -     Assistive Device (Bed Mobility) bed rails (P)   -     Row Name 11/04/21 0948          Transfers    Transfers bed-chair transfer; stand pivot/stand step transfer (P)   -     Maintains Weight-bearing Status (Transfers) physical assist to maintain; unable to maintain weight-bearing status (P)   -     Bed-Chair [place] (Transfers) minimum assist (75% patient effort) (P)   -     Assistive Device (Bed-Chair Transfers) walker, front-wheeled (P)   -     Row Name 11/04/21 0948          Stand Pivot/Stand Step Transfer    Stand Pivot/Stand Step [place] (Transfers) minimum assist (75% patient effort) (P)   -     Assistive Device (Stand Pivot Stand Step Transfer) walker, front-wheeled (P)   -     Row Name 11/04/21 0948          Gait/Stairs (Locomotion)    Gait/Stairs Locomotion gait/ambulation independence; gait/ambulation assistive device (P)   -     [place] Level (Gait) contact guard (P)   -     Assistive Device (Gait) walker, front-wheeled (P)   -     Distance in Feet (Gait) 5 (P)   -     Row Name 11/04/21 0948          Safety Issues, Functional Mobility    Safety Issues Affecting Function (Mobility) impulsivity; positioning of assistive device (P)   -     Impairments Affecting Function (Mobility) balance; coordination; endurance/activity tolerance; pain; range of motion (ROM); strength (P)   -     Row Name 11/04/21 0948          Balance    Balance Assessment standing dynamic balance (P)   -     Dynamic Standing Balance mild impairment (P)   -     Row Name 11/04/21 0948          Motor Skills    Motor Skills therapeutic exercise (P)   -     Therapeutic Exercise hip; knee; ankle (P)   -     Row Name 11/04/21 0948          Hip (Therapeutic Exercise)    Hip (Therapeutic Exercise) AROM (active range of motion) (P)   -     Hip AROM (Therapeutic Exercise)  bilateral; flexion; extension (P)   3x10  -     Row Name 11/04/21 0948          Knee (Therapeutic Exercise)    Knee (Therapeutic Exercise) AROM (active range of motion) (P)   -     Knee AROM (Therapeutic Exercise) bilateral; flexion; extension (P)   3x10  -     Row Name 11/04/21 0948          Ankle (Therapeutic Exercise)    Ankle (Therapeutic Exercise) AROM (active range of motion) (P)   -     Ankle AROM (Therapeutic Exercise) bilateral; dorsiflexion; plantarflexion (P)   3x20  -     Row Name 11/04/21 0948          Plan of Care Review    Plan of Care Reviewed With patient (P)   -     Progress improving (P)   -     Outcome Summary Pt presents with deficits in range of motion and strength in the left lower extremity, especially in the hip. Pt also presents with deficits in transfers, bed mobility, and ambulation. He requires heavy cueing and physical assist to maintain non-weight bearing status. Skilled PT services recommended to address these impairments and improve functional mobility. (P)   -     Row Name 11/04/21 0948          Physical Therapy Goals    Bed Mobility Goal Selection (PT) bed mobility, PT goal 1 (P)   -     Transfer Goal Selection (PT) transfer, PT goal 1 (P)   -     Gait Training Goal Selection (PT) gait training, PT goal 1 (P)   -     Strength Goal Selection (PT) strength, PT goal 1 (P)   -[facility] Name 11/04/21 0948          Bed Mobility Goal 1 (PT)    Activity/Assistive Device (Bed Mobility Goal 1, PT) bed mobility activities, all (P)   -     [place] Level/Cues Needed (Bed Mobility Goal 1, PT) standby assist (P)   -     Time Frame (Bed Mobility Goal 1, PT) 10 days (P)   -     Row Name 11/04/21 0948          Transfer Goal 1 (PT)    Activity/Assistive Device (Transfer Goal 1, PT) transfers, all; walker, rolling (P)   -     [place] Level/Cues Needed (Transfer Goal 1, PT) contact guard assist (P)   -     Time Frame (Transfer Goal 1, PT) 10 days (P)   -      Row Name 11/04/21 0948          Gait Training Goal 1 (PT)    Activity/Assistive Device (Gait Training Goal 1, PT) gait (walking locomotion); assistive device use; walker, rolling (P)   -     [place] Level (Gait Training Goal 1, PT) contact guard assist (P)   -     Distance (Gait Training Goal 1, PT) 150 feet (P)   -     Time Frame (Gait Training Goal 1, PT) 10 days (P)   -     Row Name 11/04/21 0948          Strength Goal 1 (PT)    Strength Goal 1 (PT) Pt will demonstrate a score of at least 4/5 for overall strength in the left lower extremity. (P)   -     Time Frame (Strength Goal 1, PT) 10 days (P)   -     Row Name 11/04/21 0948          Therapy Assessment/Plan (PT)    Rehab Potential (PT) good, to achieve stated therapy goals (P)   -     Criteria for Skilled Interventions Met (PT) skilled treatment is necessary (P)   -     Predicted Duration of Therapy Intervention (PT) 10 days (P)   -     Problem List (PT) problems related to; balance; coordination; mobility; strength; pain; range of motion (ROM) (P)   -     Activity Limitations Related to Problem List (PT) unable to ambulate safely; unable to transfer safely (P)   -     Row Name 11/04/21 0948          PT Evaluation Complexity    History, PT Evaluation Complexity 1-2 personal factors and/or comorbidities (P)   -     Examination of Body Systems (PT Eval Complexity) total of 4 or more elements (P)   -     Clinical Presentation (PT Evaluation Complexity) stable (P)   -     Clinical Decision Making (PT Evaluation Complexity) low complexity (P)   -     Overall Complexity (PT Evaluation Complexity) low complexity (P)   -     Row Name 11/04/21 0948          Therapy Plan Review/Discharge Plan (PT)    Therapy Plan Review (PT) evaluation/treatment results reviewed; patient (P)   -           User Key  (r) = Recorded By, (t) = Taken By, (c) = Cosigned By    Initials Name Provider Type    Jean Marie Chapa, PT Student PT Student               Physical Therapy Education                 Title: PT OT SLP Therapies (Done)     Topic: Physical Therapy (Done)     Point: Mobility training (Done)     Learning Progress Summary           Patient Acceptance, E,TB, VU by  at 11/4/2021 0958                   Point: Home exercise program (Done)     Learning Progress Summary           Patient Acceptance, E,TB, VU by  at 11/4/2021 0958                   Point: Body mechanics (Done)     Learning Progress Summary           Patient Acceptance, E,TB, VU by  at 11/4/2021 0958                   Point: Precautions (Done)     Learning Progress Summary           Patient Acceptance, E,TB, VU by  at 11/4/2021 0958                               User Key     Initials Effective Dates Name Provider Type Discipline     09/06/21 -  Jean Marie Branch, PT Student PT Student PT              PT Recommendation and Plan  Anticipated Discharge Disposition (PT): (P) inpatient rehabilitation facility, home with home health  Planned Therapy Interventions (PT): (P) balance training, bed mobility training, gait training, home exercise program, ROM (range of motion), strengthening, transfer training  Therapy Frequency (PT): (P) daily  Plan of Care Reviewed With: (P) patient  Progress: (P) improving  Outcome Summary: (P) Pt presents with deficits in range of motion and strength in the left lower extremity, especially in the hip. Pt also presents with deficits in transfers, bed mobility, and ambulation. He requires heavy cueing and physical assist to maintain non-weight bearing status. Skilled PT services recommended to address these impairments and improve functional mobility.   Outcome Measures     Row Name 11/04/21 0957             How much help from another person do you currently need...    Turning from your back to your side while in flat bed without using bedrails? 4 (P)   -SH      Moving from lying on back to sitting on the side of a flat bed without bedrails? 3 (P)   -SH       Moving to and from a bed to a chair (including a wheelchair)? 3 (P)   -SH      Standing up from a chair using your arms (e.g., wheelchair, bedside chair)? 2 (P)   -SH      Climbing 3-5 steps with a railing? -- (P)   -SH      To walk in hospital room? 2 (P)   -SH      AM-PAC 6 Clicks Score (PT) -- (P)   -SH              Functional Assessment    Outcome Measure Options AM-PAC 6 Clicks Basic Mobility (PT) (P)   -            User Key  (r) = Recorded By, (t) = Taken By, (c) = Cosigned By    Initials Name Provider Type     Jean Marie Branch, PT Student PT Student                 Time Calculation:    PT Charges     Row Name 11/04/21 0946             Time Calculation    PT Received On 11/04/21 (P)   -      PT Goal Re-Cert Due Date 11/13/21 (P)   -              Timed Charges    27202 - PT Therapeutic Exercise Minutes 10 (P)   -              Untimed Charges    PT Eval/Re-eval Minutes 25 (P)   -SH              Total Minutes    Timed Charges Total Minutes 10 (P)   -      Untimed Charges Total Minutes 25 (P)   -SH       Total Minutes 35 (P)   -            User Key  (r) = Recorded By, (t) = Taken By, (c) = Cosigned By    Initials Name Provider Type     Jean Marie Branch, PT Student PT Student              Therapy Charges for Today     Code Description Service Date Service Provider Modifiers Qty    [id number]  PT THER PROC EA 15 MIN 11/4/2021 Jean Marie Branch, PT Student GP 1    [id number]  PT EVAL LOW COMPLEXITY 2 11/4/2021 Jean Marie Branch, PT Student GP 1          PT G-Codes  Outcome Measure Options: (P) AM-PAC 6 Clicks Basic Mobility (PT)  AM-PAC 6 Clicks Score (PT): 16    Jean Marie Branch PT Student  11/4/2021

## 2021-11-17 ENCOUNTER — OFFICE VISIT (OUTPATIENT)
Dept: ORTHOPEDIC SURGERY | Facility: CLINIC | Age: 71
End: 2021-11-17

## 2021-11-17 VITALS — OXYGEN SATURATION: 99 % | HEIGHT: 71 IN | BODY MASS INDEX: 19.53 KG/M2 | HEART RATE: 67 BPM

## 2021-11-17 DIAGNOSIS — M25.552 LEFT HIP PAIN: Primary | ICD-10-CM

## 2021-11-17 DIAGNOSIS — M97.02XS PERIPROSTHETIC FRACTURE AROUND INTERNAL PROSTHETIC LEFT HIP JOINT, SEQUELA: ICD-10-CM

## 2021-11-17 PROCEDURE — 99213 OFFICE O/P EST LOW 20 MIN: CPT | Performed by: STUDENT IN AN ORGANIZED HEALTH CARE EDUCATION/TRAINING PROGRAM

## 2021-11-17 RX ORDER — ASPIRIN 81 MG/1
81 TABLET, CHEWABLE ORAL DAILY
COMMUNITY
End: 2021-12-20

## 2021-11-17 RX ORDER — METOPROLOL SUCCINATE 25 MG/1
25 TABLET, EXTENDED RELEASE ORAL
COMMUNITY
Start: 2021-10-06 | End: 2022-02-23 | Stop reason: SDUPTHER

## 2021-11-17 RX ORDER — ROSUVASTATIN CALCIUM 5 MG/1
5 TABLET, COATED ORAL DAILY
COMMUNITY
Start: 2021-06-24 | End: 2022-02-23 | Stop reason: SDUPTHER

## 2021-11-17 NOTE — PROGRESS NOTES
"Chief Complaint  Pain of the Left Hip    Subjective          Cm Flores presents to [facility] for   History of Present Illness    The billy presents here today for follow up evaluation of the left hip. He sustained a left periprosthetic hip fracture from a  fall on 10/26/2021 while walking down the stairs. He is at a therapy facility at this time. He is ambulating in a wheelchair. He is overall doing well and denies any pain today.     Allergies   Allergen Reactions   • Azithromycin Unknown - High Severity     zpak   • Morphine Unknown - Low Severity   • Penicillin G Unknown - Low Severity   • Penicillins Unknown - Low Severity   • Sulfa Antibiotics Unknown - Low Severity        Social History     Socioeconomic History   • Marital status: Single   Tobacco Use   • Smoking status: Current Every Day Smoker     Packs/day: 1.00   • Smokeless tobacco: Never Used   Vaping Use   • Vaping Use: Never used   Substance and Sexual Activity   • Alcohol use: Never   • Drug use: Never   • Sexual activity: Defer        I reviewed the patient's chief complaint, history of present illness, review of systems, past medical history, surgical history, family history, social history, medications, and allergy list.     REVIEW OF SYSTEMS    Constitutional: Denies fevers, chills, weight loss  Cardiovascular: Denies chest pain, shortness of breath  Skin: Denies rashes, acute skin changes  Neurologic: Denies headache, loss of consciousness  MSK: Left hip pain      Objective   Vital Signs:   Pulse 67   Ht 180.3 cm (71\")   SpO2 99%   BMI 19.53 kg/m²     Body mass index is 19.53 kg/m².    Physical Exam    Left hip- Neurovascularly intact. Positive EHL, FHL, GS and TA. Sensation intact to all 5 nerves of the foot. Positive pulses. Negative Block. Non-tender. No pain with hip ROM. Intact hip ROM and knee ROM.     Procedures    Imaging Results (Most Recent)     Procedure Component Value Units Date/Time    XR " Hip With or Without Pelvis 2 - 3 View Left [[id number]] Resulted: 11/17/21 1550     Updated: 11/17/21 1551    Narrative:      Indications: Follow-up left periprosthetic hip fracture    Views: AP pelvis, AP and frog lateral left hip    Findings: Fracture around the left greater trochanter appears stable   compared to previous films. Separate, proximally retracted fragment   appears stable. No obvious implant loosening. Hip remains reduced. No   additional fractures noted.    Comparative Data: Comparative data found and reviewed today                     Assessment and Plan    Diagnoses and all orders for this visit:    1. Left hip pain (Primary)  -     XR Hip With or Without Pelvis 2 - 3 View Left    2. Periprosthetic fracture around internal prosthetic left hip joint, sequela        Discussed the treatment plan with the patient.  Plan to remain non-weight bearing. Plan to continue at the rehab facility. I reviewed the x-rays with the patient today, he is overall doing well.     Call or return if symptoms worsen or patient has any concerns.   Will obtain X-Rays of Left hip at next visit.     Scribed for Maurisio Nash MD by Maurisio Nash MD  11/17/2021   11:18 EST         Follow Up   Return in about 2 weeks (around 12/1/2021).  Patient was given instructions and counseling regarding his condition or for health maintenance advice. Please see specific information pulled into the AVS if appropriate.       I have personally performed the services described in this document as scribed by the above individual and it is both accurate and complete.     Maurisio Nash MD  11/19/21  10:00 EST

## 2021-11-23 ENCOUNTER — TELEPHONE (OUTPATIENT)
Dept: FAMILY MEDICINE CLINIC | Facility: CLINIC | Age: 71
End: 2021-11-23

## 2021-11-23 ENCOUNTER — TELEPHONE (OUTPATIENT)
Dept: CASE MANAGEMENT | Facility: OTHER | Age: 71
End: 2021-11-23

## 2021-11-23 DIAGNOSIS — S72.009S CLOSED FRACTURE OF HIP, UNSPECIFIED LATERALITY, SEQUELA: Primary | ICD-10-CM

## 2021-11-23 DIAGNOSIS — R53.1 WEAKNESS: ICD-10-CM

## 2021-11-23 NOTE — TELEPHONE ENCOUNTER
Missy with [facility] called and needs a verbal order to start PT/OT with patient in home. Please advise.

## 2021-11-23 NOTE — TELEPHONE ENCOUNTER
Message from [facility] office needing start of care orders for him ASAP as he was discharged from [facility] Friday s/p hip fracture and he needs PT and OT please. Dr. Yeh is out and per Christina you could place orders, thank you

## 2021-11-29 ENCOUNTER — OFFICE VISIT (OUTPATIENT)
Dept: FAMILY MEDICINE CLINIC | Facility: CLINIC | Age: 71
End: 2021-11-29

## 2021-11-29 VITALS
WEIGHT: 140.2 LBS | DIASTOLIC BLOOD PRESSURE: 73 MMHG | HEIGHT: 71 IN | OXYGEN SATURATION: 98 % | RESPIRATION RATE: 20 BRPM | BODY MASS INDEX: 19.63 KG/M2 | SYSTOLIC BLOOD PRESSURE: 129 MMHG | TEMPERATURE: 97.2 F | HEART RATE: 58 BPM

## 2021-11-29 DIAGNOSIS — F51.01 PRIMARY INSOMNIA: Primary | Chronic | ICD-10-CM

## 2021-11-29 DIAGNOSIS — Z87.891 HISTORY OF SMOKING: Chronic | ICD-10-CM

## 2021-11-29 DIAGNOSIS — Z12.5 PROSTATE CANCER SCREENING: ICD-10-CM

## 2021-11-29 DIAGNOSIS — E78.2 MIXED HYPERLIPIDEMIA: Chronic | ICD-10-CM

## 2021-11-29 DIAGNOSIS — I10 ESSENTIAL HYPERTENSION: Chronic | ICD-10-CM

## 2021-11-29 DIAGNOSIS — Z00.00 ANNUAL PHYSICAL EXAM: ICD-10-CM

## 2021-11-29 PROBLEM — Z81.2 FAMILY HISTORY OF TOBACCO ABUSE: Chronic | Status: ACTIVE | Noted: 2021-11-29

## 2021-11-29 PROBLEM — Z85.51 HISTORY OF BLADDER CANCER: Chronic | Status: ACTIVE | Noted: 2021-06-24

## 2021-11-29 PROBLEM — C67.9 MALIGNANT NEOPLASM OF URINARY BLADDER (HCC): Chronic | Status: ACTIVE | Noted: 2021-11-29

## 2021-11-29 PROBLEM — Z81.2 FAMILY HISTORY OF TOBACCO ABUSE: Chronic | Status: RESOLVED | Noted: 2021-11-29 | Resolved: 2021-11-29

## 2021-11-29 PROBLEM — I25.10 ARTERIOSCLEROSIS OF CORONARY ARTERY: Status: ACTIVE | Noted: 2021-11-29

## 2021-11-29 PROBLEM — I21.9 MYOCARDIAL INFARCTION (HCC): Status: ACTIVE | Noted: 2021-11-29

## 2021-11-29 PROBLEM — M97.02XA PERIPROSTHETIC FRACTURE AROUND INTERNAL PROSTHETIC LEFT HIP JOINT (HCC): Chronic | Status: ACTIVE | Noted: 2021-11-03

## 2021-11-29 PROBLEM — Z72.0 TOBACCO ABUSE: Chronic | Status: RESOLVED | Noted: 2021-06-24 | Resolved: 2021-11-29

## 2021-11-29 PROBLEM — I50.9 CONGESTIVE HEART FAILURE (HCC): Status: ACTIVE | Noted: 2021-11-29

## 2021-11-29 PROBLEM — C67.9 MALIGNANT NEOPLASM OF URINARY BLADDER: Chronic | Status: RESOLVED | Noted: 2021-11-29 | Resolved: 2021-11-29

## 2021-11-29 PROBLEM — C67.9 MALIGNANT NEOPLASM OF URINARY BLADDER: Status: ACTIVE | Noted: 2021-11-29

## 2021-11-29 PROCEDURE — 99214 OFFICE O/P EST MOD 30 MIN: CPT | Performed by: FAMILY MEDICINE

## 2021-11-29 RX ORDER — AMMONIUM LACTATE 12 G/100G
LOTION TOPICAL
COMMUNITY
Start: 2021-11-18 | End: 2022-10-25

## 2021-11-29 NOTE — ASSESSMENT & PLAN NOTE
Lipid abnormalities are improving with treatment.  Nutritional counseling was provided., Pharmacotherapy as ordered. and lipid profile ordered today  Lipids will be reassessed 3 weeks.

## 2021-11-29 NOTE — ASSESSMENT & PLAN NOTE
The patient's insomnia is currently well controlled with an occasional quarter milligram of Xanax as needed at night.

## 2021-11-29 NOTE — PROGRESS NOTES
"Chief Complaint  Follow-up and Hypertension    Subjective          Cm Flores presents to [facility]  He is here today to for the management of his chronic medical conditions. He does not have any children. He has past medical history significant for kidney stones, coronary artery disease, peripheral vascular disease, tobacco abuse, AAA repair 2016 and bladder cancer in 2008.      He is has stopped smoking.    He has a history of left hip replacement. Since his last visit he has broken his left hip. He is still not bearing wt. And has a follow-up with orthopedics in 2 days.       The patient has no other complaints today and denies chest pain, shortness of breath, weakness, numbness, nausea, vomiting, diarrhea, dizziness or syncopal event.         Objective   Vital Signs:   /73 (BP Location: Left arm, Patient Position: Sitting)   Pulse 58   Temp 97.2 °F (36.2 °C) (Temporal)   Resp 20   Ht 180.3 cm (71\")   Wt 63.6 kg (140 lb 3.2 oz)   SpO2 98%   BMI 19.55 kg/m²     Physical Exam  Vitals reviewed.   Constitutional:       Appearance: Normal appearance. He is well-developed.   HENT:      Head: Normocephalic and atraumatic.      Right Ear: External ear normal.      Left Ear: External ear normal.      Mouth/Throat:      Pharynx: No oropharyngeal exudate.   Eyes:      Conjunctiva/sclera: Conjunctivae normal.      Pupils: Pupils are equal, round, and reactive to light.   Neck:      Vascular: No carotid bruit.   Cardiovascular:      Rate and Rhythm: Normal rate and regular rhythm.      Heart sounds: No murmur heard.  No friction rub. No gallop.    Pulmonary:      Effort: Pulmonary effort is normal.      Breath sounds: Normal breath sounds. No wheezing or rhonchi.   Abdominal:      General: There is no distension.   Skin:     General: Skin is warm and dry.   Neurological:      Mental Status: He is alert and oriented to person, place, and time.      Cranial Nerves: No cranial " nerve deficit.      Motor: No weakness.   Psychiatric:         Mood and Affect: Mood and affect normal.         Behavior: Behavior normal.         Thought Content: Thought content normal.         Judgment: Judgment normal.        Result Review :     CMP    CMP 10/26/21 11/3/21 11/4/21 11/4/21      0505 0505   Glucose 131 (A) 102 (A) 99    BUN 14 14 14    Creatinine 1.02 0.96 1.00    eGFR Non African Am 72 78 74    Sodium 138 134 (A) 134 (A)    Potassium 4.4 4.2 4.3    Chloride 101 94 (A) 99    Calcium 9.0 8.9 8.7    Albumin 4.30 4.00  3.50   Total Bilirubin 1.0 1.7 (A)  1.5 (A)   Alkaline Phosphatase 61 67  58   AST (SGOT) 17 13  11   ALT (SGPT) 8 5  <5   (A) Abnormal value            CBC    CBC 10/26/21 11/3/21 11/4/21   WBC 6.94 7.22 5.61   RBC 4.51 4.11 (A) 3.53 (A)   Hemoglobin 13.9 12.5 (A) 11.0 (A)   Hematocrit 42.4 38.1 32.7 (A)   MCV 94.0 92.7 92.6   MCH 30.8 30.4 31.2   MCHC 32.8 32.8 33.6   RDW 15.2 15.0 14.9   Platelets 98 (A) 159 116 (A)   (A) Abnormal value                              Assessment and Plan    Diagnoses and all orders for this visit:    1. Primary insomnia (Primary)  Assessment & Plan:  The patient's insomnia is currently well controlled with an occasional quarter milligram of Xanax as needed at night.      2. Essential hypertension  Assessment & Plan:  Hypertension is improving with treatment.  Continue current treatment regimen.  Dietary sodium restriction.  Blood pressure will be reassessed at the next regular appointment.    Orders:  -     Comprehensive Metabolic Panel; Future    3. Mixed hyperlipidemia  Assessment & Plan:  Lipid abnormalities are improving with treatment.  Nutritional counseling was provided., Pharmacotherapy as ordered. and lipid profile ordered today  Lipids will be reassessed 3 weeks.    Orders:  -     Lipid Panel; Future    4. Prostate cancer screening  -     PSA Screen; Future    5. Annual physical exam  Comments:  He has not had labs for over a year. He had lab  orders placed to be managed according to findings. He is a medicare patient and is not elegable for an AP.   Orders:  -     Comprehensive Metabolic Panel; Future  -     CBC & Differential; Future  -     TSH; Future    6. History of smoking  Assessment & Plan:  Smoking cessation was highly reinforced at today's visit.        Follow Up   Return in about 3 weeks (around 12/20/2021).  Patient was given instructions and counseling regarding his condition or for health maintenance advice. Please see specific information pulled into the AVS if appropriate.

## 2021-12-01 ENCOUNTER — OFFICE VISIT (OUTPATIENT)
Dept: ORTHOPEDIC SURGERY | Facility: CLINIC | Age: 71
End: 2021-12-01

## 2021-12-01 VITALS — WEIGHT: 140 LBS | HEART RATE: 60 BPM | HEIGHT: 71 IN | BODY MASS INDEX: 19.6 KG/M2 | OXYGEN SATURATION: 98 %

## 2021-12-01 DIAGNOSIS — M97.02XS PERIPROSTHETIC FRACTURE AROUND INTERNAL PROSTHETIC LEFT HIP JOINT, SEQUELA: Primary | ICD-10-CM

## 2021-12-01 PROCEDURE — 99213 OFFICE O/P EST LOW 20 MIN: CPT | Performed by: STUDENT IN AN ORGANIZED HEALTH CARE EDUCATION/TRAINING PROGRAM

## 2021-12-01 NOTE — PROGRESS NOTES
"Chief Complaint  Pain of the Left Hip    Subjective          Cm Flores presents to [facility] for   History of Present Illness    The patient presents here today for follow up evaluation of the left hip. He sustained a left periprosthetic hip fracture from a  fall on 10/26/2021 while walking down the stairs. He has been back at home for about 2 weeks. He has had home health coming to the house for therapy. He ambulates with a walker. He is overall doing well.   Allergies   Allergen Reactions   • Azithromycin Unknown - High Severity     zpak   • Morphine Unknown - Low Severity   • Penicillin G Unknown - Low Severity   • Penicillins Unknown - Low Severity   • Sulfa Antibiotics Unknown - Low Severity        Social History     Socioeconomic History   • Marital status: Single   Tobacco Use   • Smoking status: Former Smoker     Packs/day: 1.00   • Smokeless tobacco: Never Used   • Tobacco comment: hasn't smoked since Oct 26th   Vaping Use   • Vaping Use: Never used   Substance and Sexual Activity   • Alcohol use: Never   • Drug use: Never   • Sexual activity: Defer        I reviewed the patient's chief complaint, history of present illness, review of systems, past medical history, surgical history, family history, social history, medications, and allergy list.     REVIEW OF SYSTEMS    Constitutional: Denies fevers, chills, weight loss  Cardiovascular: Denies chest pain, shortness of breath  Skin: Denies rashes, acute skin changes  Neurologic: Denies headache, loss of consciousness  MSK: Left hip pain      Objective   Vital Signs:   Pulse 60   Ht 180.3 cm (71\")   Wt 63.5 kg (140 lb)   SpO2 98%   BMI 19.53 kg/m²     Body mass index is 19.53 kg/m².    Physical Exam    Left hip- Neurovascularly intact. Positive EHL, FHL, GS and TA. Sensation intact to all 5 nerves of the foot. Positive pulses. Negative Block. Non-tender. No pain with hip ROM. Intact hip flexion and knee extension. " non-tender. No pain with general resisted hip Abduction.       Procedures    Imaging Results (Most Recent)     Procedure Component Value Units Date/Time    XR Hip With or Without Pelvis 2 - 3 View Left [[id number]] Resulted: 12/01/21 1138     Updated: 12/01/21 1139    Narrative:      Indications: Follow-up left periprosthetic hip fracture    Views: AP pelvis, AP and frog lateral left hip    Findings: Left total hip arthroplasty implant again seen.  The   periprosthetic fracture involving the greater trochanter and separate,   small retracted fragment is again seen.  Fracture alignment overall   stable.  No implant subsidence or signs of obvious loosening.    Questionable callus across the fracture site.  Hip reduced.    Comparative Data: Comparative data found and reviewed today                     Assessment and Plan    Diagnoses and all orders for this visit:    1. Periprosthetic fracture around internal prosthetic left hip joint, sequela (Primary)  -     XR Hip With or Without Pelvis 2 - 3 View Left  -     Ambulatory Referral to Home Health        Discussed the treatment plan with the patient. I reviewed the x-rays that were obtained today with the patient. He has some evidence of healing shown today.  Plan to continue home health physical therapy at this time.  We will start with 25 pound weightbearing through the left lower extremity and progress as able.    Call or return if symptoms worsen or patient has any concerns.   Will obtain X-Rays of Left hip at next visit.     Scribed for Maurisio Nash MD by Maurisio Nash MD  12/01/2021   11:20 EST         Follow Up   Return in about 3 weeks (around 12/22/2021).  Patient was given instructions and counseling regarding his condition or for health maintenance advice. Please see specific information pulled into the AVS if appropriate.       I have personally performed the services described in this document as scribed by the above individual and it is both accurate and  complete.     Maurisio Nash MD  12/03/21  12:20 EST

## 2021-12-16 ENCOUNTER — LAB (OUTPATIENT)
Dept: LAB | Facility: HOSPITAL | Age: 71
End: 2021-12-16

## 2021-12-16 DIAGNOSIS — E78.2 MIXED HYPERLIPIDEMIA: Chronic | ICD-10-CM

## 2021-12-16 DIAGNOSIS — Z00.00 ANNUAL PHYSICAL EXAM: ICD-10-CM

## 2021-12-16 DIAGNOSIS — I10 ESSENTIAL HYPERTENSION: Chronic | ICD-10-CM

## 2021-12-16 DIAGNOSIS — Z12.5 PROSTATE CANCER SCREENING: ICD-10-CM

## 2021-12-16 LAB
ALBUMIN SERPL-MCNC: 4.5 G/DL (ref 3.5–5.2)
ALBUMIN/GLOB SERPL: 1.7 G/DL
ALP SERPL-CCNC: 68 U/L (ref 39–117)
ALT SERPL W P-5'-P-CCNC: 12 U/L (ref 1–41)
ANION GAP SERPL CALCULATED.3IONS-SCNC: 8.1 MMOL/L (ref 5–15)
AST SERPL-CCNC: 21 U/L (ref 1–40)
BASOPHILS # BLD AUTO: 0.02 10*3/MM3 (ref 0–0.2)
BASOPHILS NFR BLD AUTO: 0.3 % (ref 0–1.5)
BILIRUB SERPL-MCNC: 0.9 MG/DL (ref 0–1.2)
BUN SERPL-MCNC: 15 MG/DL (ref 8–23)
BUN/CREAT SERPL: 13.2 (ref 7–25)
CALCIUM SPEC-SCNC: 9.6 MG/DL (ref 8.6–10.5)
CHLORIDE SERPL-SCNC: 101 MMOL/L (ref 98–107)
CHOLEST SERPL-MCNC: 135 MG/DL (ref 0–200)
CO2 SERPL-SCNC: 31.9 MMOL/L (ref 22–29)
CREAT SERPL-MCNC: 1.14 MG/DL (ref 0.76–1.27)
DEPRECATED RDW RBC AUTO: 44.6 FL (ref 37–54)
EOSINOPHIL # BLD AUTO: 0.18 10*3/MM3 (ref 0–0.4)
EOSINOPHIL NFR BLD AUTO: 2.6 % (ref 0.3–6.2)
ERYTHROCYTE [DISTWIDTH] IN BLOOD BY AUTOMATED COUNT: 13.2 % (ref 12.3–15.4)
GFR SERPL CREATININE-BSD FRML MDRD: 64 ML/MIN/1.73
GLOBULIN UR ELPH-MCNC: 2.7 GM/DL
GLUCOSE SERPL-MCNC: 93 MG/DL (ref 65–99)
HCT VFR BLD AUTO: 45.4 % (ref 37.5–51)
HDLC SERPL-MCNC: 42 MG/DL (ref 40–60)
HGB BLD-MCNC: 15.4 G/DL (ref 13–17.7)
LDLC SERPL CALC-MCNC: 67 MG/DL (ref 0–100)
LDLC/HDLC SERPL: 1.49 {RATIO}
LYMPHOCYTES # BLD AUTO: 2.48 10*3/MM3 (ref 0.7–3.1)
LYMPHOCYTES NFR BLD AUTO: 35.2 % (ref 19.6–45.3)
MCH RBC QN AUTO: 31.6 PG (ref 26.6–33)
MCHC RBC AUTO-ENTMCNC: 33.9 G/DL (ref 31.5–35.7)
MCV RBC AUTO: 93.2 FL (ref 79–97)
MONOCYTES # BLD AUTO: 0.63 10*3/MM3 (ref 0.1–0.9)
MONOCYTES NFR BLD AUTO: 8.9 % (ref 5–12)
NEUTROPHILS NFR BLD AUTO: 3.68 10*3/MM3 (ref 1.7–7)
NEUTROPHILS NFR BLD AUTO: 52.1 % (ref 42.7–76)
PLATELET # BLD AUTO: 100 10*3/MM3 (ref 140–450)
PMV BLD AUTO: 9.6 FL (ref 6–12)
POTASSIUM SERPL-SCNC: 4.9 MMOL/L (ref 3.5–5.2)
PROT SERPL-MCNC: 7.2 G/DL (ref 6–8.5)
PSA SERPL-MCNC: 0.36 NG/ML (ref 0–4)
RBC # BLD AUTO: 4.87 10*6/MM3 (ref 4.14–5.8)
SODIUM SERPL-SCNC: 141 MMOL/L (ref 136–145)
TRIGL SERPL-MCNC: 153 MG/DL (ref 0–150)
TSH SERPL DL<=0.05 MIU/L-ACNC: 3.41 UIU/ML (ref 0.27–4.2)
VLDLC SERPL-MCNC: 26 MG/DL (ref 5–40)
WBC NRBC COR # BLD: 7.05 10*3/MM3 (ref 3.4–10.8)

## 2021-12-16 PROCEDURE — 84443 ASSAY THYROID STIM HORMONE: CPT

## 2021-12-16 PROCEDURE — 36415 COLL VENOUS BLD VENIPUNCTURE: CPT

## 2021-12-16 PROCEDURE — 80053 COMPREHEN METABOLIC PANEL: CPT

## 2021-12-16 PROCEDURE — 85025 COMPLETE CBC W/AUTO DIFF WBC: CPT

## 2021-12-16 PROCEDURE — G0103 PSA SCREENING: HCPCS

## 2021-12-16 PROCEDURE — 80061 LIPID PANEL: CPT

## 2021-12-19 DIAGNOSIS — D69.6 THROMBOCYTOPENIA (HCC): Primary | ICD-10-CM

## 2021-12-20 ENCOUNTER — OFFICE VISIT (OUTPATIENT)
Dept: FAMILY MEDICINE CLINIC | Facility: CLINIC | Age: 71
End: 2021-12-20

## 2021-12-20 VITALS
RESPIRATION RATE: 18 BRPM | BODY MASS INDEX: 20.24 KG/M2 | WEIGHT: 144.6 LBS | TEMPERATURE: 97.2 F | DIASTOLIC BLOOD PRESSURE: 75 MMHG | HEIGHT: 71 IN | OXYGEN SATURATION: 98 % | HEART RATE: 69 BPM | SYSTOLIC BLOOD PRESSURE: 147 MMHG

## 2021-12-20 DIAGNOSIS — Z87.891 HISTORY OF SMOKING: Chronic | ICD-10-CM

## 2021-12-20 DIAGNOSIS — M97.02XS PERIPROSTHETIC FRACTURE AROUND INTERNAL PROSTHETIC LEFT HIP JOINT, SEQUELA: Chronic | ICD-10-CM

## 2021-12-20 DIAGNOSIS — D69.6 THROMBOCYTOPENIA (HCC): Primary | ICD-10-CM

## 2021-12-20 DIAGNOSIS — I10 ESSENTIAL HYPERTENSION: Chronic | ICD-10-CM

## 2021-12-20 PROCEDURE — 99214 OFFICE O/P EST MOD 30 MIN: CPT | Performed by: FAMILY MEDICINE

## 2021-12-20 NOTE — ASSESSMENT & PLAN NOTE
The patient was educated about thrombocytopenia.  He verbalized understanding.  He is amendable to go to hematology for further medical evaluation management.

## 2021-12-20 NOTE — ASSESSMENT & PLAN NOTE
He is currently being managed by orthopedics.  He is ambulating with a walker.  He denies any falls or significant pain.

## 2021-12-20 NOTE — PROGRESS NOTES
"Chief Complaint  Results (recent labs )    Subjective          Cm Flores presents to [facility]  He is here today to for the management of his chronic medical conditions. He does not have any children. He has past medical history significant for kidney stones, coronary artery disease, peripheral vascular disease, tobacco abuse, AAA repair 2016 and bladder cancer in 2008.       He is has stopped smoking when he fracture his leg.     He has a history of left hip replacement. He is walking with a walker.    At his last visit he was given screening labs.  He was found to have thrombocytopenia.  This has been present now for over a month.  He denies any easy bleeding.     The patient has no other complaints today and denies chest pain, shortness of breath, weakness, numbness, nausea, vomiting, diarrhea, dizziness or syncopal event.      Objective   Vital Signs:   /75   Pulse 69   Temp 97.2 °F (36.2 °C)   Resp 18   Ht 180.3 cm (70.98\")   Wt 65.6 kg (144 lb 9.6 oz)   SpO2 98%   BMI 20.18 kg/m²     Physical Exam  Vitals reviewed.   Constitutional:       Appearance: He is well-developed.      Comments: Thin and walking with a walker.   HENT:      Head: Normocephalic and atraumatic.      Right Ear: External ear normal.      Left Ear: External ear normal.      Mouth/Throat:      Pharynx: No oropharyngeal exudate.   Eyes:      Conjunctiva/sclera: Conjunctivae normal.      Pupils: Pupils are equal, round, and reactive to light.   Neck:      Vascular: No carotid bruit.   Cardiovascular:      Rate and Rhythm: Normal rate and regular rhythm.      Heart sounds: No murmur heard.  No friction rub. No gallop.    Pulmonary:      Effort: Pulmonary effort is normal.      Breath sounds: Normal breath sounds. No wheezing or rhonchi.   Abdominal:      General: Bowel sounds are normal. There is no distension.      Palpations: Abdomen is soft.      Tenderness: There is no abdominal tenderness. "   Skin:     General: Skin is warm and dry.   Neurological:      Mental Status: He is alert and oriented to person, place, and time.      Cranial Nerves: No cranial nerve deficit.      Motor: No weakness.   Psychiatric:         Mood and Affect: Mood and affect normal.         Behavior: Behavior normal.         Thought Content: Thought content normal.         Judgment: Judgment normal.        Result Review :     [facility] [place] [facility] 11/3/21 11/4/21 11/4/21 12/16/21     [id number]    Glucose 102 (A) 99  93   BUN 14 14  15   Creatinine 0.96 1.00  1.14   eGFR Non African Am 78 74  64   Sodium 134 (A) 134 (A)  141   Potassium 4.2 4.3  4.9   Chloride 94 (A) 99  101   Calcium 8.9 8.7  9.6   Albumin 4.00  3.50 4.50   Total Bilirubin 1.7 (A)  1.5 (A) 0.9   Alkaline Phosphatase 67  58 68   AST (SGOT) 13  11 21   ALT (SGPT) 5  <5 12   (A) Abnormal value       Comments are available for some flowsheets but are not being displayed.           CBC    CBC 11/3/21 11/4/21 12/16/21   WBC 7.22 5.61 7.05   RBC 4.11 (A) 3.53 (A) 4.87   Hemoglobin 12.5 (A) 11.0 (A) 15.4   Hematocrit 38.1 32.7 (A) 45.4   MCV 92.7 92.6 93.2   MCH 30.4 31.2 31.6   MCHC 32.8 33.6 33.9   RDW 15.0 14.9 13.2   Platelets 159 116 (A) 100 (A)   (A) Abnormal value            Lipid Panel    Lipid Panel 12/16/21   Total Cholesterol 135   Triglycerides 153 (A)   HDL Cholesterol 42   VLDL Cholesterol 26   LDL Cholesterol  67   LDL/HDL Ratio 1.49   (A) Abnormal value            TSH    TSH 12/16/21   TSH 3.410                     Assessment and Plan    Diagnoses and all orders for this visit:    1. Thrombocytopenia (HCC) (Primary)  Assessment & Plan:  The patient was educated about thrombocytopenia.  He verbalized understanding.  He is amendable to go to hematology for further medical evaluation management.      2. Essential hypertension  Assessment & Plan:  Hypertension is improving with treatment.  Continue current treatment regimen.  Dietary sodium restriction.  Weight  loss.  Blood pressure will be reassessed at the next regular appointment.      3. History of smoking  Assessment & Plan:  The patient is still not smoking.  He was encouraged to never start back.      4. Periprosthetic fracture around internal prosthetic left hip joint, sequela  Assessment & Plan:  He is currently being managed by orthopedics.  He is ambulating with a walker.  He denies any falls or significant pain.        Follow Up   Return in about 6 weeks (around 1/31/2022).  Patient was given instructions and counseling regarding his condition or for health maintenance advice. Please see specific information pulled into the AVS if appropriate.

## 2021-12-22 ENCOUNTER — OFFICE VISIT (OUTPATIENT)
Dept: ORTHOPEDIC SURGERY | Facility: CLINIC | Age: 71
End: 2021-12-22

## 2021-12-22 VITALS — BODY MASS INDEX: 20.9 KG/M2 | OXYGEN SATURATION: 98 % | HEART RATE: 80 BPM | WEIGHT: 146 LBS | HEIGHT: 70 IN

## 2021-12-22 DIAGNOSIS — M97.02XD PERIPROSTHETIC FRACTURE AROUND INTERNAL PROSTHETIC LEFT HIP JOINT, SUBSEQUENT ENCOUNTER: Primary | ICD-10-CM

## 2021-12-22 DIAGNOSIS — M25.552 LEFT HIP PAIN: ICD-10-CM

## 2021-12-22 PROCEDURE — 99213 OFFICE O/P EST LOW 20 MIN: CPT | Performed by: STUDENT IN AN ORGANIZED HEALTH CARE EDUCATION/TRAINING PROGRAM

## 2021-12-22 NOTE — PROGRESS NOTES
"Chief Complaint  Pain of the Left Hip    Subjective          Cm Flores presents to [facility] for   History of Present Illness     The patient presents here today for follow up evaluation of the left hip. He sustained a left periprosthetic hip fracture from a  fall on 10/26/2021 while walking down the stairs. He has been attending home health. He is ambulating with a walker. He is overall doing well. He denies any pain today. He has no new injury or trauma to the left hip. He has no other complaints.     Allergies   Allergen Reactions   • Azithromycin Unknown - High Severity     zpak   • Morphine Unknown - Low Severity   • Penicillin G Unknown - Low Severity   • Penicillins Unknown - Low Severity   • Sulfa Antibiotics Unknown - Low Severity        Social History     Socioeconomic History   • Marital status: Single   Tobacco Use   • Smoking status: Former Smoker     Packs/day: 1.00   • Smokeless tobacco: Never Used   • Tobacco comment: hasn't smoked since Oct 26th   Vaping Use   • Vaping Use: Never used   Substance and Sexual Activity   • Alcohol use: Never   • Drug use: Never   • Sexual activity: Defer        I reviewed the patient's chief complaint, history of present illness, review of systems, past medical history, surgical history, family history, social history, medications, and allergy list.     REVIEW OF SYSTEMS    Constitutional: Denies fevers, chills, weight loss  Cardiovascular: Denies chest pain, shortness of breath  Skin: Denies rashes, acute skin changes  Neurologic: Denies headache, loss of consciousness  MSK: Left hip pain      Objective   Vital Signs:   Pulse 80   Ht 177.8 cm (70\")   Wt 66.2 kg (146 lb)   SpO2 98%   BMI 20.95 kg/m²     Body mass index is 20.95 kg/m².    Physical Exam    Left hip- well healed scar. Non-tender over the greater trochanter. Intact active hip flexion and knee extension. No pain with passive hip ROM. 4/5 hip Abduction no pain. 4/5 hip " adduction with no pain. Positive EHL, FHL, GS and TA. Sensation intact to all 5 nerves of the foot. Positive pulses.     Procedures    Imaging Results (Most Recent)     Procedure Component Value Units Date/Time    XR Hip With or Without Pelvis 2 - 3 View Left [[id number]] Resulted: 12/22/21 1058     Updated: 12/22/21 1059    Narrative:      Indications: Follow-up left periprosthetic femur fracture    Views: AP pelvis, AP and frog lateral left hip    Findings: Press-fit left total hip arthroplasty implants in place.  The   periprosthetic fracture around the left greater trochanter is stable.  No   implant subsidence or signs of loosening.  Hip reduced.    Comparative Data: Comparative data found and reviewed today                     Assessment and Plan    Diagnoses and all orders for this visit:    1. Periprosthetic fracture around internal prosthetic left hip joint, subsequent encounter (Primary)  -     Ambulatory Referral to Home Health    2. Left hip pain  -     XR Hip With or Without Pelvis 2 - 3 View Left        Discussed the treatment plan with the patient.  I reviewed the x-rays that were obtained today with the patient. Plan for weight bearing as tolerated with the walker. Plan to continue home health and transition to a cane.     Call or return if symptoms worsen or patient has any concerns.   Will obtain X-Rays of Left hip at next visit.     Scribed for Maurisio Nash MD by Maurisio Nash MD  12/22/2021   10:31 EST         Follow Up   Return in about 4 weeks (around 1/19/2022).  Patient was given instructions and counseling regarding his condition or for health maintenance advice. Please see specific information pulled into the AVS if appropriate.       I have personally performed the services described in this document as scribed by the above individual and it is both accurate and complete.     Maurisio Nash MD  12/23/21  14:12 EST

## 2022-01-20 RX ORDER — ALPRAZOLAM 0.25 MG/1
0.25 TABLET ORAL NIGHTLY PRN
Qty: 30 TABLET | Refills: 2 | Status: SHIPPED | OUTPATIENT
Start: 2022-01-20 | End: 2022-04-11 | Stop reason: SDUPTHER

## 2022-01-20 NOTE — TELEPHONE ENCOUNTER
Caller: Cm Flores    Relationship: Self    Best call back number: [phone]    Requested Prescriptions:   Requested Prescriptions     Pending Prescriptions Disp Refills   • ALPRAZolam (XANAX) 0.25 MG tablet 30 tablet 2     Sig: Take 1 tablet by mouth At Night As Needed for Anxiety.        Pharmacy where request should be sent:  [facility] - [address]   Additional details provided by patient:     Does the patient have less than a 3 day supply:  [] Yes  [] No    Francisco Banks Rep   01/20/22 09:34 EST

## 2022-01-24 ENCOUNTER — OFFICE VISIT (OUTPATIENT)
Dept: ORTHOPEDIC SURGERY | Facility: CLINIC | Age: 72
End: 2022-01-24

## 2022-01-24 VITALS — HEIGHT: 70 IN | WEIGHT: 142 LBS | BODY MASS INDEX: 20.33 KG/M2 | HEART RATE: 84 BPM | OXYGEN SATURATION: 93 %

## 2022-01-24 DIAGNOSIS — M25.552 LEFT HIP PAIN: ICD-10-CM

## 2022-01-24 DIAGNOSIS — M97.02XD PERIPROSTHETIC FRACTURE AROUND INTERNAL PROSTHETIC LEFT HIP JOINT, SUBSEQUENT ENCOUNTER: Primary | ICD-10-CM

## 2022-01-24 PROCEDURE — 99213 OFFICE O/P EST LOW 20 MIN: CPT | Performed by: STUDENT IN AN ORGANIZED HEALTH CARE EDUCATION/TRAINING PROGRAM

## 2022-01-24 NOTE — PROGRESS NOTES
"Chief Complaint  Follow-up of the Left Hip    Subjective          Cm Flores presents to [facility] for   History of Present Illness     The patient presents here today for follow up evaluation of the left hip. He sustained a left periprosthetic hip fracture from a  fall on 10/26/2021 while walking down the stairs. He has been attending home health, finished this week. He is doing home exercises. He is ambulating with a cane. He is overall doing well. He denies any pain today. He has no new injury or trauma to the left hip. He has no other complaints.     Allergies   Allergen Reactions   • Azithromycin Unknown - High Severity     zpak   • Morphine Unknown - Low Severity   • Penicillin G Unknown - Low Severity   • Penicillins Unknown - Low Severity   • Sulfa Antibiotics Unknown - Low Severity        Social History     Socioeconomic History   • Marital status: Single   Tobacco Use   • Smoking status: Former Smoker     Packs/day: 1.00   • Smokeless tobacco: Never Used   • Tobacco comment: hasn't smoked since Oct 26th   Vaping Use   • Vaping Use: Never used   Substance and Sexual Activity   • Alcohol use: Never   • Drug use: Never   • Sexual activity: Defer        I reviewed the patient's chief complaint, history of present illness, review of systems, past medical history, surgical history, family history, social history, medications, and allergy list.     REVIEW OF SYSTEMS    Constitutional: Denies fevers, chills, weight loss  Cardiovascular: Denies chest pain, shortness of breath  Skin: Denies rashes, acute skin changes  Neurologic: Denies headache, loss of consciousness  MSK: Left hip pain      Objective   Vital Signs:   Pulse 84   Ht 177.8 cm (70\")   Wt 64.4 kg (142 lb)   SpO2 93%   BMI 20.37 kg/m²     Body mass index is 20.37 kg/m².    Physical Exam    General: Alert. No acute distress.   Left hip- Ambulates with a cane. No areas of tenderness. No pain with hip ROM. Intact hip " flexion and knee extension. 4/5 hip Abduction with no pain. Calf soft non-tender. Positive EHL, FHL, GS and TA. Sensation intact to all 5 nerves of the foot. Positive pulses. Neurovascularly intact.     Procedures    Imaging Results (Most Recent)     Procedure Component Value Units Date/Time    XR Hip With or Without Pelvis 2 - 3 View Left [[id number]] Resulted: 01/24/22 1113     Updated: 01/24/22 1115    Narrative:      Indications: Follow-up left periprosthetic hip fracture    Views: AP pelvis, AP and frog lateral left hip    Findings: The periprosthetic fracture around the left greater trochanter   appears stable.  Implant positioning stable.  No signs of subsidence.  The   hip is reduced.  No additional fractures noted.  Pelvic ring intact.    Comparative Data: Comparative data found and reviewed today                     Assessment and Plan    Diagnoses and all orders for this visit:    1. Periprosthetic fracture around internal prosthetic left hip joint, subsequent encounter (Primary)  -     Ambulatory Referral to Physical Therapy Evaluate and treat, Ortho; Stretching, ROM, Strengthening; Full weight bearing    2. Left hip pain  -     XR Hip With or Without Pelvis 2 - 3 View Left        Discussed the treatment plan with the patient.  I reviewed the x-rays that were obtained today with the patient. Order for physical therapy given today. Plan to continue with activity as tolerated.     Call or return if symptoms worsen or patient has any concerns.   Will obtain X-Rays of Left hip at next visit.     Scribed for Maurisio Nash MD by Maurisio Nash MD  01/24/2022   11:03 EST         Follow Up   Return in about 6 weeks (around 3/7/2022).  Patient was given instructions and counseling regarding his condition or for health maintenance advice. Please see specific information pulled into the AVS if appropriate.       I have personally performed the services described in this document as scribed by the above individual and  it is both accurate and complete.     Maurisio Nash MD  01/24/22  16:03 EST

## 2022-01-25 ENCOUNTER — CONSULT (OUTPATIENT)
Dept: ONCOLOGY | Facility: HOSPITAL | Age: 72
End: 2022-01-25

## 2022-01-25 ENCOUNTER — LAB (OUTPATIENT)
Dept: ONCOLOGY | Facility: HOSPITAL | Age: 72
End: 2022-01-25

## 2022-01-25 VITALS
TEMPERATURE: 97.8 F | BODY MASS INDEX: 20.88 KG/M2 | OXYGEN SATURATION: 94 % | WEIGHT: 145.5 LBS | RESPIRATION RATE: 16 BRPM | SYSTOLIC BLOOD PRESSURE: 156 MMHG | HEART RATE: 94 BPM | DIASTOLIC BLOOD PRESSURE: 72 MMHG

## 2022-01-25 DIAGNOSIS — D69.6 THROMBOCYTOPENIA: ICD-10-CM

## 2022-01-25 LAB
BASOPHILS # BLD AUTO: 0.02 10*3/MM3 (ref 0–0.2)
BASOPHILS NFR BLD AUTO: 0.3 % (ref 0–1.5)
DEPRECATED RDW RBC AUTO: 45.7 FL (ref 37–54)
EOSINOPHIL # BLD AUTO: 0.16 10*3/MM3 (ref 0–0.4)
EOSINOPHIL NFR BLD AUTO: 2.3 % (ref 0.3–6.2)
ERYTHROCYTE [DISTWIDTH] IN BLOOD BY AUTOMATED COUNT: 13.3 % (ref 12.3–15.4)
HCT VFR BLD AUTO: 44.6 % (ref 37.5–51)
HGB BLD-MCNC: 14.9 G/DL (ref 13–17.7)
IMM GRANULOCYTES # BLD AUTO: 0.04 10*3/MM3 (ref 0–0.05)
IMM GRANULOCYTES NFR BLD AUTO: 0.6 % (ref 0–0.5)
LYMPHOCYTES # BLD AUTO: 2.63 10*3/MM3 (ref 0.7–3.1)
LYMPHOCYTES NFR BLD AUTO: 38 % (ref 19.6–45.3)
MCH RBC QN AUTO: 31.2 PG (ref 26.6–33)
MCHC RBC AUTO-ENTMCNC: 33.4 G/DL (ref 31.5–35.7)
MCV RBC AUTO: 93.5 FL (ref 79–97)
MONOCYTES # BLD AUTO: 0.64 10*3/MM3 (ref 0.1–0.9)
MONOCYTES NFR BLD AUTO: 9.2 % (ref 5–12)
NEUTROPHILS NFR BLD AUTO: 3.44 10*3/MM3 (ref 1.7–7)
NEUTROPHILS NFR BLD AUTO: 49.6 % (ref 42.7–76)
PLATELET # BLD AUTO: 86 10*3/MM3 (ref 140–450)
PMV BLD AUTO: 8.1 FL (ref 6–12)
RBC # BLD AUTO: 4.77 10*6/MM3 (ref 4.14–5.8)
WBC NRBC COR # BLD: 6.93 10*3/MM3 (ref 3.4–10.8)

## 2022-01-25 PROCEDURE — 99203 OFFICE O/P NEW LOW 30 MIN: CPT | Performed by: INTERNAL MEDICINE

## 2022-01-25 PROCEDURE — 36415 COLL VENOUS BLD VENIPUNCTURE: CPT

## 2022-01-25 PROCEDURE — G0463 HOSPITAL OUTPT CLINIC VISIT: HCPCS

## 2022-01-25 PROCEDURE — 85025 COMPLETE CBC W/AUTO DIFF WBC: CPT

## 2022-01-25 NOTE — PROGRESS NOTES
fabian Flores   : 1950     LOCATION: [facility] HEMATOLOGY & ONCOLOGY     Chief Complaint  Thrombocytopenia (-new)    Referring Provider: Nancy Yeh DO  PCP: Nancy Yeh DO    Oncology/Hematology History    No history exists.           Subjective        History of Present Illness   71-year-old male presents for consultation evaluation for thrombocytopenia   he denies any bruising or bleeding  Review of his CBC shows that his last platelet count was 100K .  Review of his CBCs show that his platelet count has fluctuates between low normal in the 150K, 130K, 98K and most recently 100 K  White blood cell count and hemoglobin are within normal limits  Ct scan  reveals moderate splenomegaly  CONCLUSION:     1. Moderate emphysematous changes    2. 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 No new or suspicious pulmonary nodule    4. Previous cholecystectomy    5. Moderate splenomegaly, unchanged      Review of Systems   Constitutional: Negative for appetite change, diaphoresis, fever, unexpected weight gain and unexpected weight loss.   HENT: Positive for dental problem (dry mouth) and tinnitus. Negative for hearing loss, sore throat and voice change.    Eyes: Negative for blurred vision, double vision, pain, redness and visual disturbance.   Respiratory: Positive for shortness of breath and wheezing. Negative for cough.    Cardiovascular: Negative for chest pain, palpitations and leg swelling.   Gastrointestinal: Positive for diarrhea.   Endocrine: Negative for cold intolerance, heat intolerance, polydipsia and polyuria.   Genitourinary: Negative for decreased urine volume, difficulty urinating, frequency and urinary incontinence.   Musculoskeletal: Positive for arthralgias and gait problem (broken leg- ambulates with cane). Negative for back pain and myalgias.   Skin: Positive for rash. Negative for color change, skin lesions and wound.   Neurological: Negative for  dizziness, seizures, numbness and headache.   Hematological: Negative for adenopathy. Bruises/bleeds easily.   Psychiatric/Behavioral: Negative for depressed mood. The patient is nervous/anxious.    All other systems reviewed and are negative.    Current Outpatient Medications on File Prior to Visit   Medication Sig Dispense Refill   • ALPRAZolam (XANAX) 0.25 MG tablet Take 1 tablet by mouth At Night As Needed for Anxiety. 30 tablet 2   • ammonium lactate (LAC-HYDRIN) 12 % lotion      • cetirizine (zyrTEC) 10 MG tablet Take 1 tablet by mouth Daily. 30 tablet 1   • metoprolol succinate XL (TOPROL-XL) 25 MG 24 hr tablet Take 25 mg by mouth.     • rosuvastatin (CRESTOR) 5 MG tablet Take 5 mg by mouth Daily.       Current Facility-Administered Medications on File Prior to Visit   Medication Dose Route Frequency Provider Last Rate Last Admin   • aspirin tablet 325 mg  325 mg Oral Daily Maurisio Nash MD           Allergies   Allergen Reactions   • Azithromycin Unknown - High Severity     zpak   • Morphine Unknown - Low Severity   • Penicillin G Unknown - Low Severity   • Penicillins Unknown - Low Severity   • Sulfa Antibiotics Unknown - Low Severity       Past Medical History:   Diagnosis Date   • Anxiety    • Arthritis    • Bladder cancer (HCC)    • Bladder cancer (HCC) 2010    lining of bladder    • Broken bones      as a child    • CAD (coronary artery disease) 02/2014   • Calculus of kidney    • Cataracts, both eyes    • CHF (congestive heart failure) ([facility])    • Constipation    • Deafness    • Depression    • Essential hypertension 6/24/2021   • Forgetfulness    • Gallstones    • Head injury    • Heart disease    • Hemorrhoids    • Hernia of pelvic floor    • High cholesterol    • High cholesterol    • History of bladder cancer 6/24/2021   • History of heart attack    • Hypertension    • Kidney stones    • Mixed hyperlipidemia 6/24/2021   • Primary insomnia 6/24/2021   • Shortness of breath    • Sinus trouble    •  Skin disease     UNSURE WHAT KIND/ARMS   • Tobacco abuse 6/24/2021     Past Surgical History:   Procedure Laterality Date   • ABDOMINAL AORTIC ANEURYSM REPAIR     • CAROTID ENDARTERECTOMY Right 02/2014    Dr. Boudreaux   • CHOLECYSTECTOMY     • CHOLECYSTECTOMY OPEN     • CHOLECYSTECTOMY OPEN     • CORONARY ARTERY BYPASS GRAFT     • CYSTOSCOPY      Cystoscopy, Bladder BX 2004; Cysto, Urethral Dilattion, Bilateral Retrogrades, Biopsy & Fulguration, Left Ureteroscopy 01/17/2013   • CYSTOSCOPY RETROGRADE PYELOGRAM Bilateral    • EYE SURGERY     • KNEE SURGERY Left     Repair   • TOTAL HIP ARTHROPLASTY Left      Social History     Socioeconomic History   • Marital status: Single   Tobacco Use   • Smoking status: Former Smoker     Packs/day: 1.00   • Smokeless tobacco: Never Used   • Tobacco comment: hasn't smoked since Oct 26th   Vaping Use   • Vaping Use: Never used   Substance and Sexual Activity   • Alcohol use: Never   • Drug use: Never   • Sexual activity: Defer     Family History   Problem Relation Age of Onset   • Heart disease Other      Immunization History   Administered Date(s) Administered   • Zostavax 12/18/2014       Objective     Vitals:    01/25/22 0957   BP: 156/72   Pulse: 94   Resp: 16   Temp: 97.8 °F (36.6 °C)   SpO2: 94%   Weight: 66 kg (145 lb 8.1 oz)   PainSc: 0-No pain     Body mass index is 20.88 kg/m².     Physical Exam  Constitutional:       Appearance: Normal appearance.   HENT:      Mouth/Throat:      Mouth: Mucous membranes are moist.   Cardiovascular:      Pulses: Normal pulses.   Skin:     General: Skin is warm.   Neurological:      Mental Status: He is alert.   Psychiatric:         Mood and Affect: Mood normal.               No results found for: IRON, LABIRON, TRANSFERRIN, TIBC, FERRITIN, [id number], FOLATE  ECOG score: 1 (ambulates with cane)           PHQ-9 Total Score: 0         Result Review :   The following data was reviewed by: Anna Restrepo MD on 01/25/2022:  Lab Results   Component  Value Date    HGB 14.9 01/25/2022    HCT 44.6 01/25/2022    MCV 93.5 01/25/2022    PLT 86 (L) 01/25/2022    WBC 6.93 01/25/2022    NEUTROABS 3.44 01/25/2022    LYMPHSABS 2.63 01/25/2022    MONOSABS 0.64 01/25/2022    EOSABS 0.16 01/25/2022    BASOSABS 0.02 01/25/2022     Lab Results   Component Value Date    GLUCOSE 93 12/16/2021    BUN 15 12/16/2021    CREATININE 1.14 12/16/2021     12/16/2021    K 4.9 12/16/2021     12/16/2021    CO2 31.9 (H) 12/16/2021    CALCIUM 9.6 12/16/2021    PROTEINTOT 7.2 12/16/2021    ALBUMIN 4.50 12/16/2021    BILITOT 0.9 12/16/2021    ALKPHOS 68 12/16/2021    AST 21 12/16/2021    ALT 12 12/16/2021         Data reviewed: labs          Assessment and Plan      ASSESSMENT  Thrombocytopenia  PLAN/RECOMMENDATIONS  Platelet count of 100K is adequate for hemostasis  Review of the patient's CBC reveals that his platelet count runs between low normal and just below normal  With a normal white blood cell count and a normal hemoglobin this is unlikely a primary bone marrow process  CT scan done from 2019 reveals that the patient has moderate splenomegaly and this is likely the etiology of his low platelet count  As long as the platelets remain stable no further evaluation is needed  We will repeat CBC today for monitoring of platelet count  F/u in 2 months  Diagnoses and all orders for this visit:    1. Thrombocytopenia (HCC)  -     CBC & Differential; Future  -     CBC & Differential; Future    I spent 30 minutes caring for Cm on this date of service. This time includes time spent by me in the following activities: reviewing tests, obtaining and/or reviewing a separately obtained history, counseling and educating the patient/family/caregiver, ordering medications, tests, or procedures, documenting information in the medical record and independently interpreting results and communicating that information with the patient/family/caregiver      Patient was given instructions and  counseling regarding his condition or for health maintenance advice. Please see specific information pulled into the AVS if appropriate.     Anna Restrepo MD    1/25/2022

## 2022-02-23 RX ORDER — ROSUVASTATIN CALCIUM 5 MG/1
5 TABLET, COATED ORAL DAILY
Qty: 90 TABLET | Refills: 1 | Status: SHIPPED | OUTPATIENT
Start: 2022-02-23

## 2022-02-23 RX ORDER — METOPROLOL SUCCINATE 25 MG/1
25 TABLET, EXTENDED RELEASE ORAL DAILY
Qty: 60 TABLET | Refills: 2 | Status: SHIPPED | OUTPATIENT
Start: 2022-02-23 | End: 2022-06-14

## 2022-03-07 ENCOUNTER — OFFICE VISIT (OUTPATIENT)
Dept: ORTHOPEDIC SURGERY | Facility: CLINIC | Age: 72
End: 2022-03-07

## 2022-03-07 VITALS — BODY MASS INDEX: 20.16 KG/M2 | HEIGHT: 71 IN | WEIGHT: 144 LBS

## 2022-03-07 DIAGNOSIS — M25.552 LEFT HIP PAIN: ICD-10-CM

## 2022-03-07 DIAGNOSIS — M97.02XD PERIPROSTHETIC FRACTURE AROUND INTERNAL PROSTHETIC LEFT HIP JOINT, SUBSEQUENT ENCOUNTER: Primary | ICD-10-CM

## 2022-03-07 PROCEDURE — 99213 OFFICE O/P EST LOW 20 MIN: CPT | Performed by: STUDENT IN AN ORGANIZED HEALTH CARE EDUCATION/TRAINING PROGRAM

## 2022-03-07 NOTE — PROGRESS NOTES
"Chief Complaint  Pain of the Left Hip    Subjective          Cm Flores presents to [facility] for   History of Present Illness    Cm presents today for a follow up of his left hip. Patient has a periprosthetic left hip fracture with initial injury 10/26/2021. Today, he has continued making progress with formal physical therapy. Patient states he has noticed improvements in his strength and gait. He is ambulating without any assistive devices. He reports a new fall last night, but denies associated injuries. Denies numbness or tingling.       Allergies   Allergen Reactions   • Azithromycin Unknown - High Severity     zpak   • Morphine Unknown - Low Severity   • Penicillin G Unknown - Low Severity   • Penicillins Unknown - Low Severity   • Sulfa Antibiotics Unknown - Low Severity        Social History     Socioeconomic History   • Marital status: Single   Tobacco Use   • Smoking status: Former Smoker     Packs/day: 1.00   • Smokeless tobacco: Never Used   • Tobacco comment: hasn't smoked since Oct 26th   Vaping Use   • Vaping Use: Never used   Substance and Sexual Activity   • Alcohol use: Never   • Drug use: Never   • Sexual activity: Defer        I reviewed the patient's chief complaint, history of present illness, review of systems, past medical history, surgical history, family history, social history, medications, and allergy list.     REVIEW OF SYSTEMS    Constitutional: Denies fevers, chills, weight loss  Cardiovascular: Denies chest pain, shortness of breath  Skin: Denies rashes, acute skin changes  Neurologic: Denies headache, loss of consciousness  MSK: left hip pain      Objective   Vital Signs:   Ht 180.3 cm (71\")   Wt 65.3 kg (144 lb)   BMI 20.08 kg/m²     Body mass index is 20.08 kg/m².    Physical Exam    General: Alert. No acute distress.   Gait: Slightly antalgic favoring left. No ambulatory devices.   Left lower extremity: No pain with passive hip ROM. Nontender " to palpation. Hip flexion intact. Knee extensor mechanism intact. 4/5 hip abduction. Full extension. Flexion to 120 degrees. Calf soft, nontender. Demonstrates active ankle plantarflexion and dorsiflexion. Sensation intact over the dorsal and plantar foot. Palpable pedal pulses.     Procedures    Imaging Results (Most Recent)     Procedure Component Value Units Date/Time    XR Hip With or Without Pelvis 2 - 3 View Left [[id number]] Resulted: 03/07/22 1132     Updated: 03/07/22 1133    Narrative:      Indications: Follow-up left periprosthetic proximal femur fracture    Views: AP pelvis, AP and frog lateral left hip    Findings: Periprosthetic fracture through the greater trochanter appears   stable.  Implant positioning stable.  No subsidence or signs of loosening.    Hip reduced.    Comparative Data: Comparative data found and reviewed today                  Assessment and Plan    Diagnoses and all orders for this visit:    1. Periprosthetic fracture around internal prosthetic left hip joint, subsequent encounter (Primary)    2. Left hip pain  -     XR Hip With or Without Pelvis 2 - 3 View Rimma Schilling presents today for a follow up of his left hip periprosthetic fracture with initial injury 10/26/2021. X-rays were reviewed with the patient today. Patient to continue with formal physical therapy and his home exercises. Continue to work on range of motion, strength, and gait. Follow up in 3 months for reevaluation. We will obtain new x-rays of the left hip at next visit.     Call or return if symptoms worsen or patient has any concerns.   Will obtain X-Rays of left hip at next visit.         Follow Up   Return in about 3 months (around 6/7/2022).  Patient was given instructions and counseling regarding his condition or for health maintenance advice. Please see specific information pulled into the AVS if appropriate.     Swati Pablo PA-C  03/07/22  12:51 EST

## 2022-03-14 DIAGNOSIS — M97.02XD PERIPROSTHETIC FRACTURE AROUND INTERNAL PROSTHETIC LEFT HIP JOINT, SUBSEQUENT ENCOUNTER: Primary | ICD-10-CM

## 2022-03-15 ENCOUNTER — OFFICE VISIT (OUTPATIENT)
Dept: FAMILY MEDICINE CLINIC | Facility: CLINIC | Age: 72
End: 2022-03-15

## 2022-03-15 VITALS
TEMPERATURE: 97.7 F | WEIGHT: 141.8 LBS | HEIGHT: 71 IN | HEART RATE: 101 BPM | OXYGEN SATURATION: 93 % | DIASTOLIC BLOOD PRESSURE: 69 MMHG | BODY MASS INDEX: 19.85 KG/M2 | SYSTOLIC BLOOD PRESSURE: 133 MMHG

## 2022-03-15 DIAGNOSIS — Z87.891 HISTORY OF SMOKING: Chronic | ICD-10-CM

## 2022-03-15 DIAGNOSIS — J44.9 COPD, MODERATE: Primary | Chronic | ICD-10-CM

## 2022-03-15 DIAGNOSIS — I10 ESSENTIAL HYPERTENSION: Chronic | ICD-10-CM

## 2022-03-15 PROCEDURE — 99214 OFFICE O/P EST MOD 30 MIN: CPT | Performed by: FAMILY MEDICINE

## 2022-03-15 RX ORDER — BUDESONIDE, GLYCOPYRROLATE, AND FORMOTEROL FUMARATE 160; 9; 4.8 UG/1; UG/1; UG/1
2 AEROSOL, METERED RESPIRATORY (INHALATION) 2 TIMES DAILY
Start: 2022-03-15 | End: 2022-11-03 | Stop reason: SDUPTHER

## 2022-03-15 NOTE — ASSESSMENT & PLAN NOTE
The patient was educated about the low-dose CT scan for lung cancer screening.  He refused having it done at this time.

## 2022-03-15 NOTE — PROGRESS NOTES
"Chief Complaint  Fatigue, pt had stomach bug earlier this week (Pt states he feels better and able to eat and drink light last few days ,pt wants something for nausea.), and Nausea    Subjective          Cm Flores presents to [facility]  He is here today to for the management of his chronic medical conditions and for an acute visit. He does not have any children. He has past medical history significant for kidney stones, coronary artery disease, peripheral vascular disease, tobacco abuse, AAA repair 2016 and bladder cancer in 2008.      He smoke for 50 years 1 ppd. He is has stopped smoking when he fracture his leg.     He is being managed by hemotology for his thrombocytopenia.     The patient has no other complaints today and denies chest pain, shortness of breath, weakness, numbness, nausea, vomiting, diarrhea, dizziness or syncopal event.         Objective   Vital Signs:   /69   Pulse 101   Temp 97.7 °F (36.5 °C) (Temporal)   Ht 180.3 cm (71\")   Wt 64.3 kg (141 lb 12.8 oz)   SpO2 93%   BMI 19.78 kg/m²     Physical Exam  Vitals reviewed.   Constitutional:       Appearance: Normal appearance. He is well-developed.   HENT:      Head: Normocephalic and atraumatic.      Right Ear: External ear normal.      Left Ear: External ear normal.      Mouth/Throat:      Pharynx: No oropharyngeal exudate.   Eyes:      Conjunctiva/sclera: Conjunctivae normal.      Pupils: Pupils are equal, round, and reactive to light.   Neck:      Vascular: No carotid bruit.   Cardiovascular:      Rate and Rhythm: Normal rate and regular rhythm.      Heart sounds: No murmur heard.    No friction rub. No gallop.   Pulmonary:      Effort: Pulmonary effort is normal.      Breath sounds: Normal breath sounds. No wheezing or rhonchi.   Abdominal:      General: There is no distension.   Skin:     General: Skin is warm and dry.   Neurological:      Mental Status: He is alert and oriented to person, " place, and time.      Cranial Nerves: No cranial nerve deficit.      Motor: No weakness.   Psychiatric:         Mood and Affect: Mood and affect normal.         Behavior: Behavior normal.         Thought Content: Thought content normal.         Judgment: Judgment normal.        Result Review :     CMP    CMP 11/3/21 11/4/21 11/4/21 12/16/21     0505 0505    Glucose 102 (A) 99  93   BUN 14 14  15   Creatinine 0.96 1.00  1.14   eGFR Non African Am 78 74  64   Sodium 134 (A) 134 (A)  141   Potassium 4.2 4.3  4.9   Chloride 94 (A) 99  101   Calcium 8.9 8.7  9.6   Albumin 4.00  3.50 4.50   Total Bilirubin 1.7 (A)  1.5 (A) 0.9   Alkaline Phosphatase 67  58 68   AST (SGOT) 13  11 21   ALT (SGPT) 5  <5 12   (A) Abnormal value       Comments are available for some flowsheets but are not being displayed.           CBC    CBC 11/4/21 12/16/21 1/25/22   WBC 5.61 7.05 6.93   RBC 3.53 (A) 4.87 4.77   Hemoglobin 11.0 (A) 15.4 14.9   Hematocrit 32.7 (A) 45.4 44.6   MCV 92.6 93.2 93.5   MCH 31.2 31.6 31.2   MCHC 33.6 33.9 33.4   RDW 14.9 13.2 13.3   Platelets 116 (A) 100 (A) 86 (A)   (A) Abnormal value            Lipid Panel    Lipid Panel 12/16/21   Total Cholesterol 135   Triglycerides 153 (A)   HDL Cholesterol 42   VLDL Cholesterol 26   LDL Cholesterol  67   LDL/HDL Ratio 1.49   (A) Abnormal value            TSH    TSH 12/16/21   TSH 3.410                     Assessment and Plan    Diagnoses and all orders for this visit:    1. COPD, moderate (HCC) (Primary)  Assessment & Plan:  COPD is worsening.  Medication changes per orders.          2. Essential hypertension  Assessment & Plan:  Hypertension is improving with treatment.  Continue current treatment regimen.  Dietary sodium restriction.  Blood pressure will be reassessed at the next regular appointment.      3. History of smoking  Assessment & Plan:  The patient was educated about the low-dose CT scan for lung cancer screening.  He refused having it done at this  time.      Other orders  -     Budeson-Glycopyrrol-Formoterol (Breztri Aerosphere) 160-9-4.8 MCG/ACT aerosol inhaler; Inhale 2 puffs 2 (Two) Times a Day.      Follow Up   Return in about 2 weeks (around 3/29/2022).  Patient was given instructions and counseling regarding his condition or for health maintenance advice. Please see specific information pulled into the AVS if appropriate.

## 2022-03-18 ENCOUNTER — LAB (OUTPATIENT)
Dept: ONCOLOGY | Facility: HOSPITAL | Age: 72
End: 2022-03-18

## 2022-03-18 ENCOUNTER — OFFICE VISIT (OUTPATIENT)
Dept: ONCOLOGY | Facility: HOSPITAL | Age: 72
End: 2022-03-18

## 2022-03-18 VITALS
WEIGHT: 139.99 LBS | DIASTOLIC BLOOD PRESSURE: 56 MMHG | BODY MASS INDEX: 19.52 KG/M2 | OXYGEN SATURATION: 94 % | SYSTOLIC BLOOD PRESSURE: 99 MMHG | HEART RATE: 93 BPM | RESPIRATION RATE: 20 BRPM | TEMPERATURE: 96.8 F

## 2022-03-18 DIAGNOSIS — D69.6 THROMBOCYTOPENIA: ICD-10-CM

## 2022-03-18 LAB
BASOPHILS # BLD AUTO: 0.01 10*3/MM3 (ref 0–0.2)
BASOPHILS NFR BLD AUTO: 0.2 % (ref 0–1.5)
DEPRECATED RDW RBC AUTO: 43.2 FL (ref 37–54)
EOSINOPHIL # BLD AUTO: 0.09 10*3/MM3 (ref 0–0.4)
EOSINOPHIL NFR BLD AUTO: 1.4 % (ref 0.3–6.2)
ERYTHROCYTE [DISTWIDTH] IN BLOOD BY AUTOMATED COUNT: 12.9 % (ref 12.3–15.4)
HCT VFR BLD AUTO: 38.7 % (ref 37.5–51)
HGB BLD-MCNC: 13.3 G/DL (ref 13–17.7)
IMM GRANULOCYTES # BLD AUTO: 0.02 10*3/MM3 (ref 0–0.05)
IMM GRANULOCYTES NFR BLD AUTO: 0.3 % (ref 0–0.5)
LYMPHOCYTES # BLD AUTO: 1.54 10*3/MM3 (ref 0.7–3.1)
LYMPHOCYTES NFR BLD AUTO: 23.5 % (ref 19.6–45.3)
MCH RBC QN AUTO: 31 PG (ref 26.6–33)
MCHC RBC AUTO-ENTMCNC: 34.4 G/DL (ref 31.5–35.7)
MCV RBC AUTO: 90.2 FL (ref 79–97)
MONOCYTES # BLD AUTO: 0.56 10*3/MM3 (ref 0.1–0.9)
MONOCYTES NFR BLD AUTO: 8.5 % (ref 5–12)
NEUTROPHILS NFR BLD AUTO: 4.34 10*3/MM3 (ref 1.7–7)
NEUTROPHILS NFR BLD AUTO: 66.1 % (ref 42.7–76)
PLATELET # BLD AUTO: 153 10*3/MM3 (ref 140–450)
PMV BLD AUTO: 9.7 FL (ref 6–12)
RBC # BLD AUTO: 4.29 10*6/MM3 (ref 4.14–5.8)
WBC NRBC COR # BLD: 6.56 10*3/MM3 (ref 3.4–10.8)

## 2022-03-18 PROCEDURE — 99213 OFFICE O/P EST LOW 20 MIN: CPT | Performed by: INTERNAL MEDICINE

## 2022-03-18 PROCEDURE — 85025 COMPLETE CBC W/AUTO DIFF WBC: CPT

## 2022-03-18 PROCEDURE — G0463 HOSPITAL OUTPT CLINIC VISIT: HCPCS

## 2022-03-18 PROCEDURE — 36415 COLL VENOUS BLD VENIPUNCTURE: CPT

## 2022-03-18 NOTE — PROGRESS NOTES
Cm Flores   : 1950     LOCATION: [facility] HEMATOLOGY & ONCOLOGY     Chief Complaint  Thrombocytopenia    Referring Provider: Nancy Yeh DO  PCP: Nancy Yeh DO    Oncology/Hematology History    No history exists.           Subjective        History of Present Illness   71-year-old male presents for follow-up for thrombocytopenia  Review of the records revealed that he had splenomegaly on scan.    Review of Systems   Constitutional: Positive for appetite change (decreased) and fatigue. Negative for diaphoresis, fever, unexpected weight gain and unexpected weight loss.   HENT: Negative for hearing loss, mouth sores, sore throat, swollen glands, trouble swallowing and voice change.    Eyes: Negative for blurred vision, double vision, pain, redness and visual disturbance.   Respiratory: Positive for shortness of breath. Negative for cough and wheezing.    Cardiovascular: Negative for chest pain, palpitations and leg swelling.   Gastrointestinal: Negative for abdominal pain, blood in stool, constipation, diarrhea, nausea and vomiting.   Endocrine: Positive for cold intolerance. Negative for heat intolerance, polydipsia and polyuria.   Genitourinary: Negative for decreased urine volume, difficulty urinating, dysuria, frequency, hematuria and urinary incontinence.   Musculoskeletal: Negative for arthralgias, back pain, joint swelling and myalgias.   Skin: Negative for color change, rash, skin lesions and wound.   Neurological: Negative for dizziness, seizures, weakness, numbness and headache.   Hematological: Negative for adenopathy. Does not bruise/bleed easily.   Psychiatric/Behavioral: Negative for depressed mood. The patient is not nervous/anxious.    All other systems reviewed and are negative.    Current Outpatient Medications on File Prior to Visit   Medication Sig Dispense Refill   • ALPRAZolam (XANAX) 0.25 MG tablet Take 1 tablet by mouth At Night As Needed for Anxiety. 30  tablet 2   • ammonium lactate (LAC-HYDRIN) 12 % lotion      • Budeson-Glycopyrrol-Formoterol (Breztri Aerosphere) 160-9-4.8 MCG/ACT aerosol inhaler Inhale 2 puffs 2 (Two) Times a Day.     • cetirizine (zyrTEC) 10 MG tablet Take 1 tablet by mouth Daily. 30 tablet 1   • metoprolol succinate XL (TOPROL-XL) 25 MG 24 hr tablet Take 1 tablet by mouth Daily. 60 tablet 2   • rosuvastatin (CRESTOR) 5 MG tablet Take 1 tablet by mouth Daily. 90 tablet 1     Current Facility-Administered Medications on File Prior to Visit   Medication Dose Route Frequency Provider Last Rate Last Admin   • aspirin tablet 325 mg  325 mg Oral Daily Maurisio Nash MD           Allergies   Allergen Reactions   • Azithromycin Unknown - High Severity     zpak   • Morphine Unknown - Low Severity   • Penicillin G Unknown - Low Severity   • Penicillins Unknown - Low Severity   • Sulfa Antibiotics Unknown - Low Severity       Past Medical History:   Diagnosis Date   • Anxiety    • Arthritis    • Bladder cancer ([facility])    • Bladder cancer ([facility]) 2010    lining of bladder    • Broken bones      as a child    • CAD (coronary artery disease) 02/2014   • Calculus of kidney    • Cataracts, both eyes    • CHF (congestive heart failure) ([facility])    • Constipation    • Deafness    • Depression    • Essential hypertension 6/24/2021   • Forgetfulness    • Gallstones    • Head injury    • Heart disease    • Hemorrhoids    • Hernia of pelvic floor    • High cholesterol    • High cholesterol    • History of bladder cancer 6/24/2021   • History of heart attack    • Hypertension    • Kidney stones    • Mixed hyperlipidemia 6/24/2021   • Primary insomnia 6/24/2021   • Shortness of breath    • Sinus trouble    • Skin disease     UNSURE WHAT KIND/ARMS   • Tobacco abuse 6/24/2021     Past Surgical History:   Procedure Laterality Date   • ABDOMINAL AORTIC ANEURYSM REPAIR     • CAROTID ENDARTERECTOMY Right 02/2014    Dr. Boudreaux   • CHOLECYSTECTOMY     • CHOLECYSTECTOMY OPEN     •  CHOLECYSTECTOMY OPEN     • CORONARY ARTERY BYPASS GRAFT     • CYSTOSCOPY      Cystoscopy, Bladder BX 2004; Cysto, Urethral Dilattion, Bilateral Retrogrades, Biopsy & Fulguration, Left Ureteroscopy 01/17/2013   • CYSTOSCOPY RETROGRADE PYELOGRAM Bilateral    • EYE SURGERY     • KNEE SURGERY Left     Repair   • TOTAL HIP ARTHROPLASTY Left      Social History     Socioeconomic History   • Marital status: Single   Tobacco Use   • Smoking status: Current Every Day Smoker     Packs/day: 1.00     Types: Cigarettes   • Smokeless tobacco: Never Used   • Tobacco comment: hasn't smoked since Oct 26th   Vaping Use   • Vaping Use: Never used   Substance and Sexual Activity   • Alcohol use: Never   • Drug use: Never   • Sexual activity: Defer     Family History   Problem Relation Age of Onset   • Heart disease Other      Immunization History   Administered Date(s) Administered   • Zostavax 12/18/2014       Objective     BP 99/56   Pulse 93   Temp 96.8 °F (36 °C)   Resp 20   Wt 63.5 kg (139 lb 15.9 oz)   SpO2 94%   BMI 19.52 kg/m²         Physical Exam    Deferred for discussion      No results found for: IRON, LABIRON, TRANSFERRIN, TIBC, FERRITIN, [id number], FOLATE  ECOG score: 1           PHQ-9 Total Score:           Result Review :   The following data was reviewed by: Anna Restrepo MD on 03/18/2022:  Lab Results   Component Value Date    HGB 13.3 03/18/2022    HCT 38.7 03/18/2022    MCV 90.2 03/18/2022     03/18/2022    WBC 6.56 03/18/2022    NEUTROABS 4.34 03/18/2022    LYMPHSABS 1.54 03/18/2022    MONOSABS 0.56 03/18/2022    EOSABS 0.09 03/18/2022    BASOSABS 0.01 03/18/2022     Lab Results   Component Value Date    GLUCOSE 93 12/16/2021    BUN 15 12/16/2021    CREATININE 1.14 12/16/2021     12/16/2021    K 4.9 12/16/2021     12/16/2021    CO2 31.9 (H) 12/16/2021    CALCIUM 9.6 12/16/2021    PROTEINTOT 7.2 12/16/2021    ALBUMIN 4.50 12/16/2021    BILITOT 0.9 12/16/2021    ALKPHOS 68 12/16/2021    AST  21 12/16/2021    ALT 12 12/16/2021     Lab Results   Component Value Date    WBC 6.56 03/18/2022    HGB 13.3 03/18/2022    HCT 38.7 03/18/2022    MCV 90.2 03/18/2022     03/18/2022         Data reviewed: labs          Assessment and Plan      ASSESSMENT  Thrombocytopenia  PLAN/RECOMMENDATIONS  Review of the patient's CBCs revealed that the platelet count either runs just below normal or low normal range  Most recent CBC reveals that his platelet count is once again just inside the normal range (153K)  Platelet count is adequate for hemostasis  Diagnoses and all orders for this visit:    1. Thrombocytopenia (HCC)      I spent 20 minutes caring for Cm on this date of service. This time includes time spent by me in the following activities: reviewing tests, counseling and educating the patient/family/caregiver, documenting information in the medical record and independently interpreting results and communicating that information with the patient/family/caregiver    Patient was given instructions and counseling regarding his condition or for health maintenance advice. Please see specific information pulled into the AVS if appropriate.     Anna Restrepo MD    3/18/2022

## 2022-03-24 ENCOUNTER — LAB (OUTPATIENT)
Dept: LAB | Facility: HOSPITAL | Age: 72
End: 2022-03-24

## 2022-03-24 ENCOUNTER — OFFICE VISIT (OUTPATIENT)
Dept: FAMILY MEDICINE CLINIC | Facility: CLINIC | Age: 72
End: 2022-03-24

## 2022-03-24 VITALS
TEMPERATURE: 98.2 F | BODY MASS INDEX: 19.4 KG/M2 | SYSTOLIC BLOOD PRESSURE: 93 MMHG | DIASTOLIC BLOOD PRESSURE: 50 MMHG | HEART RATE: 74 BPM | OXYGEN SATURATION: 93 % | WEIGHT: 138.6 LBS | HEIGHT: 71 IN

## 2022-03-24 DIAGNOSIS — R10.13 EPIGASTRIC PAIN: ICD-10-CM

## 2022-03-24 DIAGNOSIS — I10 ESSENTIAL HYPERTENSION: Chronic | ICD-10-CM

## 2022-03-24 DIAGNOSIS — R10.13 EPIGASTRIC PAIN: Primary | ICD-10-CM

## 2022-03-24 DIAGNOSIS — R63.4 UNINTENDED WEIGHT LOSS: ICD-10-CM

## 2022-03-24 DIAGNOSIS — J44.9 COPD, MODERATE: Chronic | ICD-10-CM

## 2022-03-24 LAB
ALBUMIN SERPL-MCNC: 3.4 G/DL (ref 3.5–5.2)
ALBUMIN/GLOB SERPL: 0.9 G/DL
ALP SERPL-CCNC: 55 U/L (ref 39–117)
ALT SERPL W P-5'-P-CCNC: 8 U/L (ref 1–41)
AMYLASE SERPL-CCNC: 46 U/L (ref 28–100)
ANION GAP SERPL CALCULATED.3IONS-SCNC: 10.2 MMOL/L (ref 5–15)
AST SERPL-CCNC: 15 U/L (ref 1–40)
BILIRUB SERPL-MCNC: 1.9 MG/DL (ref 0–1.2)
BUN SERPL-MCNC: 14 MG/DL (ref 8–23)
BUN/CREAT SERPL: 11.4 (ref 7–25)
CALCIUM SPEC-SCNC: 8.8 MG/DL (ref 8.6–10.5)
CHLORIDE SERPL-SCNC: 91 MMOL/L (ref 98–107)
CO2 SERPL-SCNC: 29.8 MMOL/L (ref 22–29)
CREAT SERPL-MCNC: 1.23 MG/DL (ref 0.76–1.27)
EGFRCR SERPLBLD CKD-EPI 2021: 63.2 ML/MIN/1.73
GLOBULIN UR ELPH-MCNC: 3.6 GM/DL
GLUCOSE SERPL-MCNC: 113 MG/DL (ref 65–99)
LIPASE SERPL-CCNC: 34 U/L (ref 13–60)
POTASSIUM SERPL-SCNC: 3.9 MMOL/L (ref 3.5–5.2)
PROT SERPL-MCNC: 7 G/DL (ref 6–8.5)
SODIUM SERPL-SCNC: 131 MMOL/L (ref 136–145)
TSH SERPL DL<=0.05 MIU/L-ACNC: 1.53 UIU/ML (ref 0.27–4.2)

## 2022-03-24 PROCEDURE — 82150 ASSAY OF AMYLASE: CPT

## 2022-03-24 PROCEDURE — 83690 ASSAY OF LIPASE: CPT

## 2022-03-24 PROCEDURE — 99214 OFFICE O/P EST MOD 30 MIN: CPT | Performed by: FAMILY MEDICINE

## 2022-03-24 PROCEDURE — 84443 ASSAY THYROID STIM HORMONE: CPT

## 2022-03-24 PROCEDURE — 80053 COMPREHEN METABOLIC PANEL: CPT

## 2022-03-24 PROCEDURE — 36415 COLL VENOUS BLD VENIPUNCTURE: CPT

## 2022-03-24 RX ORDER — SUCRALFATE 1 G/1
1 TABLET ORAL 4 TIMES DAILY
Qty: 120 TABLET | Refills: 0 | Status: SHIPPED | OUTPATIENT
Start: 2022-03-24 | End: 2022-04-29

## 2022-03-24 RX ORDER — PANTOPRAZOLE SODIUM 40 MG/1
40 TABLET, DELAYED RELEASE ORAL DAILY
Qty: 90 TABLET | Refills: 1 | Status: SHIPPED | OUTPATIENT
Start: 2022-03-24 | End: 2022-04-15

## 2022-03-24 NOTE — PROGRESS NOTES
"Chief Complaint  loss of appetite (Started 2 weeks ago ) and Fatigue    Subjective          Cm Flores presents to [facility]  He is here today to for the management of his chronic medical conditions and for an acute visit. He does not have any children. He has Hx of kidney stones, coronary artery disease, peripheral vascular disease, tobacco abuse, Hx of AAA repair 2016 and Hx of bladder cancer in 2008.       He smoke for 50 years 1 ppd. He is has stopped smoking when he fracture his leg.     He is being managed by hemotology for his thrombocytopenia.     He is complaining that two weeks ago he started having pain in his stomach as well and loss of appetite. He is having nausea in the am. He denies a history of this in the past. He does have occasional bouts of constipation.    His breathing has been made better with [address] samples that he was given at his last visit.    He is having some bouts of lightheadedness when he first stands up.       Objective   Vital Signs:   BP 93/50   Pulse 74   Temp 98.2 °F (36.8 °C) (Temporal)   Ht 180.3 cm (71\")   Wt 62.9 kg (138 lb 9.6 oz)   SpO2 93%   BMI 19.33 kg/m²     BMI is within normal parameters. No follow-up required.      Physical Exam  Vitals reviewed.   Constitutional:       Appearance: Normal appearance. He is well-developed.   HENT:      Head: Normocephalic and atraumatic.      Right Ear: External ear normal.      Left Ear: External ear normal.      Mouth/Throat:      Pharynx: No oropharyngeal exudate.   Eyes:      Conjunctiva/sclera: Conjunctivae normal.      Pupils: Pupils are equal, round, and reactive to light.   Neck:      Vascular: No carotid bruit.   Cardiovascular:      Rate and Rhythm: Normal rate and regular rhythm.      Heart sounds: No murmur heard.    No friction rub. No gallop.   Pulmonary:      Effort: Pulmonary effort is normal.      Breath sounds: Normal breath sounds. No wheezing or rhonchi. "   Abdominal:      General: There is no distension.   Skin:     General: Skin is warm and dry.   Neurological:      Mental Status: He is alert and oriented to person, place, and time.      Cranial Nerves: No cranial nerve deficit.      Motor: No weakness.   Psychiatric:         Mood and Affect: Mood and affect normal.         Behavior: Behavior normal.         Thought Content: Thought content normal.         Judgment: Judgment normal.        Result Review :     CMP    CMP 11/3/21 11/4/21 11/4/21 12/16/21     0505 0505    Glucose 102 (A) 99  93   BUN 14 14  15   Creatinine 0.96 1.00  1.14   eGFR Non African Am 78 74  64   Sodium 134 (A) 134 (A)  141   Potassium 4.2 4.3  4.9   Chloride 94 (A) 99  101   Calcium 8.9 8.7  9.6   Albumin 4.00  3.50 4.50   Total Bilirubin 1.7 (A)  1.5 (A) 0.9   Alkaline Phosphatase 67  58 68   AST (SGOT) 13  11 21   ALT (SGPT) 5  <5 12   (A) Abnormal value       Comments are available for some flowsheets but are not being displayed.           CBC    CBC 12/16/21 1/25/22 3/18/22   WBC 7.05 6.93 6.56   RBC 4.87 4.77 4.29   Hemoglobin 15.4 14.9 13.3   Hematocrit 45.4 44.6 38.7   MCV 93.2 93.5 90.2   MCH 31.6 31.2 31.0   MCHC 33.9 33.4 34.4   RDW 13.2 13.3 12.9   Platelets 100 (A) 86 (A) 153   (A) Abnormal value            Lipid Panel    Lipid Panel 12/16/21   Total Cholesterol 135   Triglycerides 153 (A)   HDL Cholesterol 42   VLDL Cholesterol 26   LDL Cholesterol  67   LDL/HDL Ratio 1.49   (A) Abnormal value            TSH    TSH 12/16/21   TSH 3.410                     Assessment and Plan    Diagnoses and all orders for this visit:    1. Epigastric pain (Primary)  Comments:  Pt's symptoms could be due to gastric ulcer, gastritis, remnants of viral gastroenteritis.  Could be explored.  H. pylori breath test ordered.   Orders:  -     Comprehensive metabolic panel; Future  -     TSH; Future  -     H. Pylori Breath Test - Breath, Lung; Future  -     Lipase; Future  -     Amylase; Future  -     CT  Abdomen Pelvis With & Without Contrast; Future    2. Essential hypertension  Assessment & Plan:  Hypertension is improving with treatment.  Continue current treatment regimen.  Dietary sodium restriction.  Blood pressure will be reassessed at the next regular appointment.      3. Unintended weight loss  Comments:  At this point is unintended weight loss appears to be more reflected on his stomach discomfort and dyspepsia.  Will rule out H. pylori and carafate and PPI.  Orders:  -     Comprehensive metabolic panel; Future  -     TSH; Future    4. COPD, moderate (HCC)  Assessment & Plan:  COPD is improving with treatment.  Continue current medications.          Other orders  -     sucralfate (Carafate) 1 g tablet; Take 1 tablet by mouth 4 (Four) Times a Day.  Dispense: 120 tablet; Refill: 0  -     pantoprazole (PROTONIX) 40 MG EC tablet; Take 1 tablet by mouth Daily.  Dispense: 90 tablet; Refill: 1      Follow Up   Return in about 2 weeks (around 4/7/2022).  Patient was given instructions and counseling regarding his condition or for health maintenance advice. Please see specific information pulled into the AVS if appropriate.

## 2022-03-25 ENCOUNTER — LAB (OUTPATIENT)
Dept: LAB | Facility: HOSPITAL | Age: 72
End: 2022-03-25

## 2022-03-25 LAB — UREA BREATH TEST QL: NEGATIVE

## 2022-03-25 PROCEDURE — 83013 H PYLORI (C-13) BREATH: CPT

## 2022-03-30 ENCOUNTER — TELEPHONE (OUTPATIENT)
Dept: FAMILY MEDICINE CLINIC | Facility: CLINIC | Age: 72
End: 2022-03-30

## 2022-03-30 NOTE — TELEPHONE ENCOUNTER
Caller: Cm Flores    Relationship to patient: Self    Best call back number: [phone]    Patient is needing: PATIENT WOULD LIKE A CALL BACK REGARDING CT SCAN (REFERRAL)

## 2022-03-31 NOTE — TELEPHONE ENCOUNTER
Spoke with patient. He stated everything was sorted out yesterday. Had no other issues or questions

## 2022-04-11 RX ORDER — ALPRAZOLAM 0.25 MG/1
0.25 TABLET ORAL NIGHTLY PRN
Qty: 30 TABLET | Refills: 5 | Status: SHIPPED | OUTPATIENT
Start: 2022-04-11 | End: 2022-10-11 | Stop reason: SDUPTHER

## 2022-04-11 NOTE — TELEPHONE ENCOUNTER
Caller: Cm Flores    Relationship: Self    Best call back number: [phone]    Requested Prescriptions:   Requested Prescriptions     Pending Prescriptions Disp Refills   • ALPRAZolam (XANAX) 0.25 MG tablet 30 tablet 2     Sig: Take 1 tablet by mouth At Night As Needed for Anxiety.        Pharmacy where request should be sent: [address] [phone] [facility] [phone] [facility]     Additional details provided by patient: OUT OF MEDICATION     Does the patient have less than a 3 day supply:  [x] Yes  [] No    Francisco Corona Rep   04/11/22 11:08 EDT

## 2022-04-15 ENCOUNTER — OFFICE VISIT (OUTPATIENT)
Dept: FAMILY MEDICINE CLINIC | Facility: CLINIC | Age: 72
End: 2022-04-15

## 2022-04-15 VITALS
TEMPERATURE: 97.3 F | WEIGHT: 134.7 LBS | DIASTOLIC BLOOD PRESSURE: 50 MMHG | SYSTOLIC BLOOD PRESSURE: 102 MMHG | RESPIRATION RATE: 18 BRPM | BODY MASS INDEX: 18.86 KG/M2 | HEIGHT: 71 IN | HEART RATE: 87 BPM | OXYGEN SATURATION: 97 %

## 2022-04-15 DIAGNOSIS — I10 ESSENTIAL HYPERTENSION: Chronic | ICD-10-CM

## 2022-04-15 DIAGNOSIS — R10.84 GENERALIZED ABDOMINAL PAIN: Primary | ICD-10-CM

## 2022-04-15 DIAGNOSIS — R63.4 UNINTENDED WEIGHT LOSS: ICD-10-CM

## 2022-04-15 PROBLEM — D69.6 THROMBOCYTOPENIA (HCC): Chronic | Status: ACTIVE | Noted: 2021-12-20

## 2022-04-15 PROCEDURE — 99214 OFFICE O/P EST MOD 30 MIN: CPT | Performed by: FAMILY MEDICINE

## 2022-04-15 NOTE — PROGRESS NOTES
"Chief Complaint  Fatigue (When walking , patient is eating )    Subjective          Cm Flores presents to [facility]  He is here today for follow-up for the management of his chronic medical condition.  He does not have any children.  He has a history of kidney stones.  He has coronary artery disease, peripheral vascular disease, tobacco abuse, history of AAA repair in 2016 and bladder cancer in 2008.    He has a 50 pack year smoking habit he stopped smoking last year.    He has been managed by hematology for thrombocytopenia.    He has lost 4 lbs since his last visit. His abdominal pain has resolved. He is scheduled for a Ct abdomen in 5 days. He is feeling better today.     The patient has no other complaints today and denies chest pain, shortness of breath, weakness, numbness, nausea, vomiting, diarrhea, dizziness or syncopal event.        Objective   Vital Signs:   /50 (BP Location: Left arm, Patient Position: Sitting)   Pulse 87   Temp 97.3 °F (36.3 °C)   Resp 18   Ht 180.3 cm (70.98\")   Wt 61.1 kg (134 lb 11.2 oz)   SpO2 97%   BMI 18.80 kg/m²     BMI is within normal parameters. No follow-up required.      Physical Exam  Vitals reviewed.   Constitutional:       Appearance: Normal appearance. He is well-developed.   HENT:      Head: Normocephalic and atraumatic.      Right Ear: External ear normal.      Left Ear: External ear normal.      Mouth/Throat:      Pharynx: No oropharyngeal exudate.   Eyes:      Conjunctiva/sclera: Conjunctivae normal.      Pupils: Pupils are equal, round, and reactive to light.   Neck:      Vascular: No carotid bruit.   Cardiovascular:      Rate and Rhythm: Normal rate and regular rhythm.      Heart sounds: No murmur heard.    No friction rub. No gallop.   Pulmonary:      Effort: Pulmonary effort is normal.      Breath sounds: Normal breath sounds. No wheezing or rhonchi.   Abdominal:      General: Bowel sounds are normal. There is no " (H25.013) Cortical age-related cataract, bilateral - Assesment : Examination revealed Cortical  Cataract. Patient is currently asymptomatic and functioning well. - Plan : Monitor for changes. Advised patient to call our office with decreased vision or increased symptoms. Glasses prescription updated and given to help with visual acuity and function.   RTC 1 year EXAM distension.      Palpations: Abdomen is soft.      Tenderness: There is no abdominal tenderness.   Skin:     General: Skin is warm and dry.   Neurological:      Mental Status: He is alert and oriented to person, place, and time.      Cranial Nerves: No cranial nerve deficit.      Motor: No weakness.   Psychiatric:         Mood and Affect: Mood and affect normal.         Behavior: Behavior normal.         Thought Content: Thought content normal.         Judgment: Judgment normal.        Result Review :     [facility]    CMP 11/4/21 11/4/21 12/16/21 3/24/22    0505 0505     Glucose 99  93 113 (A)   BUN 14  15 14   Creatinine 1.00  1.14 1.23   eGFR Non African Am 74  64    Sodium 134 (A)  141 131 (A)   Potassium 4.3  4.9 3.9   Chloride 99  101 91 (A)   Calcium 8.7  9.6 8.8   Albumin  3.50 4.50 3.40 (A)   Total Bilirubin  1.5 (A) 0.9 1.9 (A)   Alkaline Phosphatase  58 68 55   AST (SGOT)  11 21 15   ALT (SGPT)  <5 12 8   (A) Abnormal value       Comments are available for some flowsheets but are not being displayed.           CBC    CBC 12/16/21 1/25/22 3/18/22   WBC 7.05 6.93 6.56   RBC 4.87 4.77 4.29   Hemoglobin 15.4 14.9 13.3   Hematocrit 45.4 44.6 38.7   MCV 93.2 93.5 90.2   MCH 31.6 31.2 31.0   MCHC 33.9 33.4 34.4   RDW 13.2 13.3 12.9   Platelets 100 (A) 86 (A) 153   (A) Abnormal value            Lipid Panel    Lipid Panel 12/16/21   Total Cholesterol 135   Triglycerides 153 (A)   HDL Cholesterol 42   VLDL Cholesterol 26   LDL Cholesterol  67   LDL/HDL Ratio 1.49   (A) Abnormal value            TSH    TSH 12/16/21 3/24/22   TSH 3.410 1.530                     Assessment and Plan    Diagnoses and all orders for this visit:    1. Generalized abdominal pain (Primary)  Assessment & Plan:  The patient's abdominal pain has pretty much resolved.  Still is concerning he had elevated bilirubin.  CT scan ordered of the abdomen to be manage according to findings.      2. Essential hypertension  Assessment & Plan:  Hypertension is  improving with treatment.  Continue current treatment regimen.  Dietary sodium restriction.  Weight loss.  Blood pressure will be reassessed at the next regular appointment.      3. Unintended weight loss  Assessment & Plan:  It is not 100% clear as to what is causes his wt loss. He did have an elevated bilirubin. It may be due to a virus. He no longer has his gallbladder. His lipase was normal. We will wait until his CT was finished before more decision making. He was told to go to the ER if his symptoms worsen.         Follow Up   Return in about 3 months (around 7/15/2022).  Patient was given instructions and counseling regarding his condition or for health maintenance advice. Please see specific information pulled into the AVS if appropriate.

## 2022-04-15 NOTE — ASSESSMENT & PLAN NOTE
It is not 100% clear as to what is causes his wt loss. He did have an elevated bilirubin. It may be due to a virus. He no longer has his gallbladder. His lipase was normal. We will wait until his CT was finished before more decision making. He was told to go to the ER if his symptoms worsen.

## 2022-04-15 NOTE — ASSESSMENT & PLAN NOTE
The patient's abdominal pain has pretty much resolved.  Still is concerning he had elevated bilirubin.  CT scan ordered of the abdomen to be manage according to findings.

## 2022-04-20 ENCOUNTER — HOSPITAL ENCOUNTER (OUTPATIENT)
Dept: CT IMAGING | Facility: HOSPITAL | Age: 72
Discharge: HOME | End: 2022-04-20
Admitting: FAMILY MEDICINE

## 2022-04-20 DIAGNOSIS — R10.13 EPIGASTRIC PAIN: ICD-10-CM

## 2022-04-20 PROCEDURE — 0 IOPAMIDOL PER 1 ML: Performed by: FAMILY MEDICINE

## 2022-04-20 PROCEDURE — 74177 CT ABD & PELVIS W/CONTRAST: CPT

## 2022-04-20 RX ADMIN — IOPAMIDOL 100 ML: 755 INJECTION, SOLUTION INTRAVENOUS at 15:24

## 2022-04-29 ENCOUNTER — OFFICE VISIT (OUTPATIENT)
Dept: FAMILY MEDICINE CLINIC | Facility: CLINIC | Age: 72
End: 2022-04-29

## 2022-04-29 VITALS
BODY MASS INDEX: 19.29 KG/M2 | RESPIRATION RATE: 18 BRPM | DIASTOLIC BLOOD PRESSURE: 83 MMHG | HEIGHT: 71 IN | TEMPERATURE: 97.5 F | OXYGEN SATURATION: 94 % | HEART RATE: 90 BPM | SYSTOLIC BLOOD PRESSURE: 125 MMHG | WEIGHT: 137.8 LBS

## 2022-04-29 DIAGNOSIS — Z87.891 HISTORY OF SMOKING: Chronic | ICD-10-CM

## 2022-04-29 DIAGNOSIS — R63.4 UNINTENDED WEIGHT LOSS: Primary | ICD-10-CM

## 2022-04-29 DIAGNOSIS — I10 ESSENTIAL HYPERTENSION: Chronic | ICD-10-CM

## 2022-04-29 PROCEDURE — 99214 OFFICE O/P EST MOD 30 MIN: CPT | Performed by: FAMILY MEDICINE

## 2022-04-29 NOTE — ASSESSMENT & PLAN NOTE
I believe his unintended weight loss was due to a virus.  He is gained 3 pounds since his last visit.  We will bring him back in 3 months and reevaluate his weight at that time and manage according findings.

## 2022-04-29 NOTE — ASSESSMENT & PLAN NOTE
Smoking cessation was reinforced at today's visit.  The patient still has not started back smoking yet.  Hopefully, he will have the endurance to stay the course and abstain from smoking.  At his next clinic appointment we will reinforce smoking abstinence.

## 2022-04-29 NOTE — PROGRESS NOTES
"Chief Complaint  Results (Go over ct scan)    Subjective          Cm Flores presents to [facility]  He is here today for follow-up for the management of his chronic medical condition.  He does not have any children.  He has a history of kidney stones.  He has coronary artery disease, peripheral vascular disease, tobacco abuse, history of AAA repair in 2016 and bladder cancer in 2008.     He has a 50 pack year smoking habit and he stopped smoking last year.     He has been managed by hematology for thrombocytopenia.     He has gained 3 lbs since his last visit. HHe is feeling better today than his last visit..      The patient has no complaints today and denies chest pain, shortness of breath, weakness, numbness, nausea, vomiting, diarrhea, dizziness or syncopal event.      Objective   Vital Signs:   /83   Pulse 90   Temp 97.5 °F (36.4 °C)   Resp 18   Ht 180.3 cm (70.98\")   Wt 62.5 kg (137 lb 12.8 oz)   SpO2 94%   BMI 19.23 kg/m²     Physical Exam  Vitals reviewed.   Constitutional:       Appearance: Normal appearance. He is well-developed.   HENT:      Head: Normocephalic and atraumatic.      Right Ear: External ear normal.      Left Ear: External ear normal.      Mouth/Throat:      Pharynx: No oropharyngeal exudate.   Eyes:      Conjunctiva/sclera: Conjunctivae normal.      Pupils: Pupils are equal, round, and reactive to light.   Neck:      Vascular: No carotid bruit.   Cardiovascular:      Rate and Rhythm: Normal rate and regular rhythm.      Heart sounds: No murmur heard.    No friction rub. No gallop.   Pulmonary:      Effort: Pulmonary effort is normal.      Breath sounds: Normal breath sounds. No wheezing or rhonchi.   Abdominal:      General: There is no distension.   Skin:     General: Skin is warm and dry.   Neurological:      Mental Status: He is alert and oriented to person, place, and time.      Cranial Nerves: No cranial nerve deficit.      Motor: No " weakness.   Psychiatric:         Mood and Affect: Mood and affect normal.         Behavior: Behavior normal.         Thought Content: Thought content normal.         Judgment: Judgment normal.        Result Review :     CMP    CMP 11/4/21 11/4/21 12/16/21 3/24/22    0505 0505     Glucose 99  93 113 (A)   BUN 14  15 14   Creatinine 1.00  1.14 1.23   eGFR Non African Am 74  64    Sodium 134 (A)  141 131 (A)   Potassium 4.3  4.9 3.9   Chloride 99  101 91 (A)   Calcium 8.7  9.6 8.8   Albumin  3.50 4.50 3.40 (A)   Total Bilirubin  1.5 (A) 0.9 1.9 (A)   Alkaline Phosphatase  58 68 55   AST (SGOT)  11 21 15   ALT (SGPT)  <5 12 8   (A) Abnormal value       Comments are available for some flowsheets but are not being displayed.           CBC    CBC 12/16/21 1/25/22 3/18/22   WBC 7.05 6.93 6.56   RBC 4.87 4.77 4.29   Hemoglobin 15.4 14.9 13.3   Hematocrit 45.4 44.6 38.7   MCV 93.2 93.5 90.2   MCH 31.6 31.2 31.0   MCHC 33.9 33.4 34.4   RDW 13.2 13.3 12.9   Platelets 100 (A) 86 (A) 153   (A) Abnormal value            Lipid Panel    Lipid Panel 12/16/21   Total Cholesterol 135   Triglycerides 153 (A)   HDL Cholesterol 42   VLDL Cholesterol 26   LDL Cholesterol  67   LDL/HDL Ratio 1.49   (A) Abnormal value            TSH    TSH 12/16/21 3/24/22   TSH 3.410 1.530                     Assessment and Plan    Diagnoses and all orders for this visit:    1. Unintended weight loss (Primary)  Assessment & Plan:  I believe his unintended weight loss was due to a virus.  He is gained 3 pounds since his last visit.  We will bring him back in 3 months and reevaluate his weight at that time and manage according findings.      2. History of smoking  Assessment & Plan:  Smoking cessation was reinforced at today's visit.  The patient still has not started back smoking yet.  Hopefully, he will have the endurance to stay the course and abstain from smoking.  At his next clinic appointment we will reinforce smoking abstinence.      3. Essential  hypertension  Assessment & Plan:  Hypertension is improving with treatment.  Continue current treatment regimen.  Dietary sodium restriction.  Blood pressure will be reassessed at the next regular appointment.      BMI is within normal parameters. No follow-up required.         Follow Up   Return in about 3 months (around 7/29/2022).  Patient was given instructions and counseling regarding his condition or for health maintenance advice. Please see specific information pulled into the AVS if appropriate.

## 2022-06-08 ENCOUNTER — OFFICE VISIT (OUTPATIENT)
Dept: ORTHOPEDIC SURGERY | Facility: CLINIC | Age: 72
End: 2022-06-08

## 2022-06-08 VITALS — WEIGHT: 140 LBS | BODY MASS INDEX: 19.6 KG/M2 | HEIGHT: 71 IN

## 2022-06-08 DIAGNOSIS — M97.02XD PERIPROSTHETIC FRACTURE AROUND INTERNAL PROSTHETIC LEFT HIP JOINT, SUBSEQUENT ENCOUNTER: Primary | ICD-10-CM

## 2022-06-08 PROCEDURE — 99213 OFFICE O/P EST LOW 20 MIN: CPT | Performed by: PHYSICIAN ASSISTANT

## 2022-06-08 NOTE — PROGRESS NOTES
"Chief Complaint  Follow-up of the Left Hip    Subjective          Cm Flores presents to [facility] for   History of Present Illness    Cm presents today for follow-up of his left hip.  Patient has a left periprosthetic femur fracture with initial injury 10/26/2021.  Today, he states he is doing well.  He has completed formal physical therapy and continues with his home exercises.  He denies pain today.  He denies new injuries.  Reports occasional numbness to his foot for which he is experienced since his initial hip surgery.    Allergies   Allergen Reactions   • Azithromycin Unknown - High Severity     zpak   • Morphine Unknown - Low Severity   • Penicillin G Unknown - Low Severity   • Penicillins Unknown - Low Severity   • Sulfa Antibiotics Unknown - Low Severity        Social History     Socioeconomic History   • Marital status: Single   Tobacco Use   • Smoking status: Former Smoker     Types: Cigarettes   • Smokeless tobacco: Never Used   • Tobacco comment: smokes a average of 1ppw   Vaping Use   • Vaping Use: Never used   Substance and Sexual Activity   • Alcohol use: Never   • Drug use: Never   • Sexual activity: Defer        I reviewed the patient's chief complaint, history of present illness, review of systems, past medical history, surgical history, family history, social history, medications, and allergy list.     REVIEW OF SYSTEMS    Constitutional: Denies fevers, chills, weight loss  Cardiovascular: Denies chest pain, shortness of breath  Skin: Denies rashes, acute skin changes  Neurologic: Denies headache, loss of consciousness  MSK: Left hip follow-up      Objective   Vital Signs:   Ht 180.3 cm (71\")   Wt 63.5 kg (140 lb)   BMI 19.53 kg/m²     Body mass index is 19.53 kg/m².    Physical Exam    General: Alert. No acute distress.   Left lower extremity: No pain with passive hip range of motion.  Nontender to palpation.  Hip flexion intact.  4/5 hip abduction.  Knee " extensor mechanism intact.  Calf soft, nontender.  Demonstrates active ankle plantarflexion dorsiflexion.  Sensation intact to the dorsal and plantar foot.  Palpable pedal pulses.    Procedures    Imaging Results (Most Recent)     Procedure Component Value Units Date/Time    XR Hip With or Without Pelvis 2 - 3 View Left [[id number]] Resulted: 06/08/22 1733     Updated: 06/08/22 1735    Narrative:      Indications: Follow-up left periprosthetic proximal femur fracture    Views: AP and frog lateral left hip    Findings: No periprosthetic proximal femur fracture is again seen.    Fracture alignment is stable.  Implant positioning remains stable.  No   evidence of complications or loosening noted with the implant.  Hip is   reduced.    Comparative Data: Comparative data found and reviewed today.                   Assessment and Plan    Diagnoses and all orders for this visit:    1. Periprosthetic fracture around internal prosthetic left hip joint, subsequent encounter (Primary)  -     XR Hip With or Without Pelvis 2 - 3 View Left        Cm presents today for follow-up of his left hip periprosthetic femur fracture with initial injury 10/26/2021.  X-rays were reviewed with the patient today.  X-rays remained stable.  Patient is instructed to continue with his home exercises.  Continue working on range of motion and strength.  Patient will follow up in 3 months for reevaluation.  We will obtain x-rays of left hip at next visit.    Call or return if symptoms worsen or patient has any concerns.   Will obtain X-Rays of left hip at next visit.         Follow Up   Return in about 3 months (around 9/8/2022).  Patient was given instructions and counseling regarding his condition or for health maintenance advice. Please see specific information pulled into the AVS if appropriate.     Swati Pablo PA-C  06/08/22  17:38 EDT

## 2022-06-14 RX ORDER — METOPROLOL SUCCINATE 25 MG/1
TABLET, EXTENDED RELEASE ORAL
Qty: 60 TABLET | Refills: 1 | Status: SHIPPED | OUTPATIENT
Start: 2022-06-14 | End: 2022-08-10 | Stop reason: SDUPTHER

## 2022-07-26 ENCOUNTER — OFFICE VISIT (OUTPATIENT)
Dept: FAMILY MEDICINE CLINIC | Facility: CLINIC | Age: 72
End: 2022-07-26

## 2022-07-26 VITALS
WEIGHT: 147.5 LBS | HEIGHT: 71 IN | TEMPERATURE: 97 F | HEART RATE: 76 BPM | RESPIRATION RATE: 16 BRPM | OXYGEN SATURATION: 95 % | BODY MASS INDEX: 20.65 KG/M2 | DIASTOLIC BLOOD PRESSURE: 62 MMHG | SYSTOLIC BLOOD PRESSURE: 114 MMHG

## 2022-07-26 DIAGNOSIS — Z51.81 MEDICATION MONITORING ENCOUNTER: ICD-10-CM

## 2022-07-26 DIAGNOSIS — Z00.00 MEDICARE ANNUAL WELLNESS VISIT, SUBSEQUENT: Primary | ICD-10-CM

## 2022-07-26 PROBLEM — R63.4 UNINTENDED WEIGHT LOSS: Status: RESOLVED | Noted: 2022-04-15 | Resolved: 2022-07-26

## 2022-07-26 PROCEDURE — 80305 DRUG TEST PRSMV DIR OPT OBS: CPT | Performed by: FAMILY MEDICINE

## 2022-07-26 PROCEDURE — 1170F FXNL STATUS ASSESSED: CPT | Performed by: FAMILY MEDICINE

## 2022-07-26 PROCEDURE — 1159F MED LIST DOCD IN RCRD: CPT | Performed by: FAMILY MEDICINE

## 2022-07-26 PROCEDURE — 96160 PT-FOCUSED HLTH RISK ASSMT: CPT | Performed by: FAMILY MEDICINE

## 2022-07-26 PROCEDURE — G0439 PPPS, SUBSEQ VISIT: HCPCS | Performed by: FAMILY MEDICINE

## 2022-07-26 PROCEDURE — 1126F AMNT PAIN NOTED NONE PRSNT: CPT | Performed by: FAMILY MEDICINE

## 2022-07-26 RX ORDER — PANTOPRAZOLE SODIUM 40 MG/1
40 TABLET, DELAYED RELEASE ORAL DAILY
COMMUNITY
Start: 2022-06-10

## 2022-07-26 NOTE — PROGRESS NOTES
The ABCs of the Annual Wellness Visit  Subsequent Medicare Wellness Visit    Chief Complaint   Patient presents with   • Medicare Wellness-subsequent   • COPD   • Anxiety      Subjective    History of Present Illness:  Cm Flores is a 71 y.o. male who presents for a Subsequent Medicare Wellness Visit.    The following portions of the patient's history were reviewed and   updated as appropriate: allergies, current medications, past family history, past medical history, past social history, past surgical history and problem list.    Compared to one year ago, the patient feels his physical   health is better.    Compared to one year ago, the patient feels his mental   health is better.    Recent Hospitalizations:  This patient has had a [facility] admission record on file within the last 365 days.    Current Medical Providers:  Patient Care Team:  Nancy Yeh DO as PCP - General (Family Medicine)    Outpatient Medications Prior to Visit   Medication Sig Dispense Refill   • ALPRAZolam (XANAX) 0.25 MG tablet Take 1 tablet by mouth At Night As Needed for Anxiety. 30 tablet 5   • ammonium lactate (LAC-HYDRIN) 12 % lotion      • Budeson-Glycopyrrol-Formoterol (Breztri Aerosphere) 160-9-4.8 MCG/ACT aerosol inhaler Inhale 2 puffs 2 (Two) Times a Day.     • metoprolol succinate XL (TOPROL-XL) 25 MG 24 hr tablet TAKE 1 TABLET BY MOUTH ONCE DAILY 60 tablet 1   • pantoprazole (PROTONIX) 40 MG EC tablet      • rosuvastatin (CRESTOR) 5 MG tablet Take 1 tablet by mouth Daily. 90 tablet 1     Facility-Administered Medications Prior to Visit   Medication Dose Route Frequency Provider Last Rate Last Admin   • aspirin tablet 325 mg  325 mg Oral Daily Maurisio Nash MD           No opioid medication identified on active medication list. I have reviewed chart for other potential  high risk medication/s and harmful drug interactions in the elderly.          Aspirin is on active medication list. Aspirin use is indicated based on  "review of current medical condition/s. Pros and cons of this therapy have been discussed today. Benefits of this medication outweigh potential harm.  Patient has been encouraged to continue taking this medication.  .      Patient Active Problem List   Diagnosis   • History of bladder cancer   • Essential hypertension   • Mixed hyperlipidemia   • Primary insomnia   • History of smoking   • Periprosthetic fracture around internal prosthetic left hip joint (HCC)   • Myocardial infarction (HCC)   • Arteriosclerosis of coronary artery   • Congestive heart failure (HCC)   • Thrombocytopenia (HCC)   • COPD, moderate (HCC)   • Generalized abdominal pain   • Medicare annual wellness visit, subsequent     Advance Care Planning  Advance Directive is not on file.  ACP discussion was declined by the patient. Patient has an advance directive (not in EMR), copy requested.          Objective    Vitals:    22 0959   BP: 114/62   BP Location: Left arm   Patient Position: Sitting   Pulse: 76   Resp: 16   Temp: 97 °F (36.1 °C)   SpO2: 95%   Weight: 66.9 kg (147 lb 8 oz)   Height: 180.3 cm (70.98\")   PainSc: 0-No pain     Estimated body mass index is 20.58 kg/m² as calculated from the following:    Height as of this encounter: 180.3 cm (70.98\").    Weight as of this encounter: 66.9 kg (147 lb 8 oz).    BMI is within normal parameters. No other follow-up for BMI required.      Does the patient have evidence of cognitive impairment? No    Physical Exam            HEALTH RISK ASSESSMENT    Smoking Status:  Social History     Tobacco Use   Smoking Status Former Smoker   • Packs/day: 1.00   • Types: Cigarettes   • Start date: 1972   • Quit date: 10/1/2021   • Years since quittin.8   Smokeless Tobacco Never Used   Tobacco Comment    smokes a average of 1ppw, no second hand smoke exposure now .     Alcohol Consumption:  Social History     Substance and Sexual Activity   Alcohol Use Never     Fall Risk Screen:    STEADI Fall Risk " Assessment was completed, and patient is at HIGH risk for falls. Assessment completed on:3/15/2022    Depression Screening:  PHQ-2/PHQ-9 Depression Screening 7/26/2022   Retired PHQ-9 Total Score -   Retired Total Score -   Little Interest or Pleasure in Doing Things 0-->not at all   Feeling Down, Depressed or Hopeless 0-->not at all   PHQ-9: Brief Depression Severity Measure Score 0       Health Habits and Functional and Cognitive Screening:  Functional & Cognitive Status 7/26/2022   Do you have difficulty preparing food and eating? No   Do you have difficulty bathing yourself, getting dressed or grooming yourself? No   Do you have difficulty using the toilet? No   Do you have difficulty moving around from place to place? No   Do you have trouble with steps or getting out of a bed or a chair? No   Current Diet Well Balanced Diet   Dental Exam Up to date   Eye Exam Up to date   Exercise (times per week) 0 times per week   Current Exercises Include No Regular Exercise   Do you need help using the phone?  No   Are you deaf or do you have serious difficulty hearing?  Yes   Do you need help with transportation? No   Do you need help shopping? No   Do you need help preparing meals?  No   Do you need help with housework?  No   Do you need help with laundry? No   Do you need help taking your medications? Yes   Do you need help managing money? No   Do you ever drive or ride in a car without wearing a seat belt? Yes   Have you felt unusual stress, anger or loneliness in the last month? No   Who do you live with? Alone   If you need help, do you have trouble finding someone available to you? Yes   Have you been bothered in the last four weeks by sexual problems? No   Do you have difficulty concentrating, remembering or making decisions? No       Age-appropriate Screening Schedule:  Refer to the list below for future screening recommendations based on patient's age, sex and/or medical conditions. Orders for these recommended  tests are listed in the plan section. The patient has been provided with a written plan.    Health Maintenance   Topic Date Due   • TDAP/TD VACCINES (1 - Tdap) 07/26/2022 (Originally 12/13/1969)   • ZOSTER VACCINE (2 of 3) 11/29/2022 (Originally 2/12/2015)   • INFLUENZA VACCINE  10/01/2022   • LIPID PANEL  12/16/2022              Assessment & Plan   CMS Preventative Services Quick Reference  Risk Factors Identified During Encounter  None Identified  The above risks/problems have been discussed with the patient.  Follow up actions/plans if indicated are seen below in the Assessment/Plan Section.  Pertinent information has been shared with the patient in the After Visit Summary.    Diagnoses and all orders for this visit:    1. Medicare annual wellness visit, subsequent (Primary)    2. Medication monitoring encounter  -     POC Urine Drug Screen, Triage        Follow Up:   Return in about 6 months (around 1/26/2023).     An After Visit Summary and PPPS were made available to the patient.

## 2022-08-10 RX ORDER — METOPROLOL SUCCINATE 25 MG/1
25 TABLET, EXTENDED RELEASE ORAL DAILY
Qty: 90 TABLET | Refills: 1 | Status: SHIPPED | OUTPATIENT
Start: 2022-08-10 | End: 2022-10-11 | Stop reason: SDUPTHER

## 2022-08-10 NOTE — TELEPHONE ENCOUNTER
Caller: Cm Flores    Relationship: Self    Best call back number: [phone]    Requested Prescriptions:   Requested Prescriptions     Pending Prescriptions Disp Refills   • metoprolol succinate XL (TOPROL-XL) 25 MG 24 hr tablet 60 tablet 1     Sig: Take 1 tablet by mouth Daily.        Pharmacy where request should be sent: [address] [phone] [facility] [phone] [facility]     Additional details provided by patient: PATIENT IS OUT     Does the patient have less than a 3 day supply:  [x] Yes  [] No    Francisco Lehman Rep   08/10/22 08:17 EDT

## 2022-09-07 ENCOUNTER — OFFICE VISIT (OUTPATIENT)
Dept: ORTHOPEDIC SURGERY | Facility: CLINIC | Age: 72
End: 2022-09-07

## 2022-09-07 VITALS — HEIGHT: 70 IN | BODY MASS INDEX: 21.05 KG/M2 | WEIGHT: 147 LBS

## 2022-09-07 DIAGNOSIS — M25.552 LEFT HIP PAIN: ICD-10-CM

## 2022-09-07 DIAGNOSIS — M97.02XD PERIPROSTHETIC FRACTURE AROUND INTERNAL PROSTHETIC LEFT HIP JOINT, SUBSEQUENT ENCOUNTER: Primary | ICD-10-CM

## 2022-09-07 PROCEDURE — 99213 OFFICE O/P EST LOW 20 MIN: CPT | Performed by: PHYSICIAN ASSISTANT

## 2022-09-07 NOTE — PROGRESS NOTES
"Chief Complaint  Follow-up of the Left Hip    Subjective          Cm Flores presents to [facility] for   History of Present Illness    Cm Flores presents today for a follow-up of his left hip.  Patient has a left periprosthetic femur fracture with initial injury 10/26/2021.  Today, patient continues to do well.  He reports no pain.  He has been doing his home exercises and states they are going well.  He reports occasional numbness and tingling in his feet that he experienced prior to this injury.  He denies any new injuries.      Allergies   Allergen Reactions   • Azithromycin Unknown - High Severity     zpak   • Morphine Unknown - Low Severity   • Penicillin G Unknown - Low Severity   • Penicillins Unknown - Low Severity   • Sulfa Antibiotics Unknown - Low Severity        Social History     Socioeconomic History   • Marital status: Single   Tobacco Use   • Smoking status: Former Smoker     Packs/day: 1.00     Types: Cigarettes     Start date: 1972     Quit date: 10/1/2021     Years since quittin.9   • Smokeless tobacco: Never Used   • Tobacco comment: smokes a average of 1ppw, no second hand smoke exposure now .   Vaping Use   • Vaping Use: Never used   Substance and Sexual Activity   • Alcohol use: Never   • Drug use: Never   • Sexual activity: Defer        I reviewed the patient's chief complaint, history of present illness, review of systems, past medical history, surgical history, family history, social history, medications, and allergy list.     REVIEW OF SYSTEMS    Constitutional: Denies fevers, chills, weight loss  Cardiovascular: Denies chest pain, shortness of breath  Skin: Denies rashes, acute skin changes  Neurologic: Denies headache, loss of consciousness  MSK: Left hip pain      Objective   Vital Signs:   Ht 177.8 cm (70\")   Wt 66.7 kg (147 lb)   BMI 21.09 kg/m²     Body mass index is 21.09 kg/m².    Physical Exam    General: Alert. No acute distress. "   Left lower extremity: No tenderness to palpation about the hip.  No pain with passive hip range of motion.  Hip flexion intact.  5 out of 5 hip abduction.  Knee extensor mechanism intact.  Knee stable to varus and valgus stress.  Calf soft, nontender.  Demonstrates active ankle plantarflexion and dorsiflexion.  Sensation intact over the dorsal and plantar foot.  Palpable pedal pulses.    Procedures    Imaging Results (Most Recent)     Procedure Component Value Units Date/Time    XR Hip With or Without Pelvis 2 - 3 View Left [[id number]] Resulted: 09/07/22 1425     Updated: 09/07/22 1426    Narrative:      Indications: Follow-up left periprosthetic femur fracture    Views: AP and frog lateral left femur    Findings: Left proximal periprosthetic femur fracture is seen.  Increase   in callus formation about the fracture line.  Implant remains in place   without complications or loosening.  Hip is reduced.    Comparative Data: Comparative data found and reviewed today.                   Assessment and Plan    Diagnoses and all orders for this visit:    1. Periprosthetic fracture around internal prosthetic left hip joint, subsequent encounter (Primary)    2. Left hip pain  -     XR Hip With or Without Pelvis 2 - 3 View Left        Cm Flores presents today for follow-up of his left hip periprosthetic femur fracture with initial injury 10/26/2021.  X-rays were reviewed with the patient today remained stable.  Patient will continue with his home exercises.  Continue with activities as tolerated.  Patient will follow-up as needed.    Call or return if symptoms worsen or patient has any concerns.       Follow Up   Return if symptoms worsen or fail to improve.  Patient was given instructions and counseling regarding his condition or for health maintenance advice. Please see specific information pulled into the AVS if appropriate.     Swati Pablo PA-C  09/07/22  16:11 EDT

## 2022-10-10 ENCOUNTER — TELEPHONE (OUTPATIENT)
Dept: FAMILY MEDICINE CLINIC | Facility: CLINIC | Age: 72
End: 2022-10-10

## 2022-10-10 NOTE — TELEPHONE ENCOUNTER
Caller: Cm Flores    Relationship: Self    Best call back number: [phone]     Requested Prescriptions:        BLOOD PRESSURE MEDICATION       PATIENT DID NOT KNOW THEY NAME OF MEDICATION     Pharmacy where request should be sent: [address] - [phone] [facility] [phone] [facility]     Additional details provided by patient: OUT OF MEDICATION     Does the patient have less than a 3 day supply:  [x] Yes  [] No    Francisco Corona Rep   10/10/22 13:26 EDT

## 2022-10-11 RX ORDER — METOPROLOL SUCCINATE 25 MG/1
25 TABLET, EXTENDED RELEASE ORAL DAILY
Qty: 90 TABLET | Refills: 1 | Status: SHIPPED | OUTPATIENT
Start: 2022-10-11

## 2022-10-11 RX ORDER — ALPRAZOLAM 0.25 MG/1
0.25 TABLET ORAL NIGHTLY PRN
Qty: 30 TABLET | Refills: 5 | Status: SHIPPED | OUTPATIENT
Start: 2022-10-11

## 2022-10-11 NOTE — TELEPHONE ENCOUNTER
Mr. Ferreira has requested a refill on his Alprazolam 0.25mg 1 tablet at bedtime as needed for anxiety.

## 2022-10-11 NOTE — TELEPHONE ENCOUNTER
Refill request for  controlled substance.      Date of request: 10/11/2022    Medication requested: Xanax (Alprazolam)  Last OV: 7/26/22  Last UDS: 7/26/22      Christina Dawkins

## 2022-10-12 RX ORDER — ALPRAZOLAM 0.25 MG/1
TABLET ORAL
Qty: 30 TABLET | Refills: 4 | OUTPATIENT
Start: 2022-10-12

## 2022-10-25 ENCOUNTER — HOSPITAL ENCOUNTER (OUTPATIENT)
Dept: GENERAL RADIOLOGY | Facility: HOSPITAL | Age: 72
Discharge: HOME | End: 2022-10-25
Admitting: NURSE PRACTITIONER

## 2022-10-25 ENCOUNTER — APPOINTMENT (OUTPATIENT)
Dept: GENERAL RADIOLOGY | Facility: HOSPITAL | Age: 72
End: 2022-10-25

## 2022-10-25 ENCOUNTER — OFFICE VISIT (OUTPATIENT)
Dept: FAMILY MEDICINE CLINIC | Facility: CLINIC | Age: 72
End: 2022-10-25

## 2022-10-25 ENCOUNTER — HOSPITAL ENCOUNTER (INPATIENT)
Facility: HOSPITAL | Age: 72
LOS: 6 days | Discharge: HOME HEALTH | End: 2022-10-31
Attending: EMERGENCY MEDICINE | Admitting: STUDENT IN AN ORGANIZED HEALTH CARE EDUCATION/TRAINING PROGRAM

## 2022-10-25 VITALS
WEIGHT: 149.9 LBS | BODY MASS INDEX: 21.46 KG/M2 | HEART RATE: 112 BPM | DIASTOLIC BLOOD PRESSURE: 44 MMHG | TEMPERATURE: 100 F | OXYGEN SATURATION: 91 % | HEIGHT: 70 IN | SYSTOLIC BLOOD PRESSURE: 110 MMHG

## 2022-10-25 DIAGNOSIS — R26.2 DIFFICULTY WALKING: ICD-10-CM

## 2022-10-25 DIAGNOSIS — R11.2 NAUSEA AND VOMITING, UNSPECIFIED VOMITING TYPE: ICD-10-CM

## 2022-10-25 DIAGNOSIS — R06.02 SOB (SHORTNESS OF BREATH): ICD-10-CM

## 2022-10-25 DIAGNOSIS — I10 ESSENTIAL HYPERTENSION: Chronic | ICD-10-CM

## 2022-10-25 DIAGNOSIS — R68.83 CHILLS: ICD-10-CM

## 2022-10-25 DIAGNOSIS — R09.02 HYPOXIA: ICD-10-CM

## 2022-10-25 DIAGNOSIS — R09.89 CHEST CONGESTION: ICD-10-CM

## 2022-10-25 DIAGNOSIS — J44.1 COPD WITH EXACERBATION: ICD-10-CM

## 2022-10-25 DIAGNOSIS — J44.1 COPD EXACERBATION: Primary | ICD-10-CM

## 2022-10-25 DIAGNOSIS — Z78.9 DECREASED ACTIVITIES OF DAILY LIVING (ADL): ICD-10-CM

## 2022-10-25 DIAGNOSIS — Z87.891 HISTORY OF SMOKING: Chronic | ICD-10-CM

## 2022-10-25 DIAGNOSIS — R09.81 NASAL CONGESTION: Primary | ICD-10-CM

## 2022-10-25 DIAGNOSIS — A41.9 SEPSIS, DUE TO UNSPECIFIED ORGANISM, UNSPECIFIED WHETHER ACUTE ORGAN DYSFUNCTION PRESENT: ICD-10-CM

## 2022-10-25 DIAGNOSIS — J44.9 COPD, MODERATE: Chronic | ICD-10-CM

## 2022-10-25 LAB
ALBUMIN SERPL-MCNC: 4.2 G/DL (ref 3.5–5.2)
ALBUMIN/GLOB SERPL: 1.4 G/DL
ALP SERPL-CCNC: 56 U/L (ref 39–117)
ALT SERPL W P-5'-P-CCNC: 6 U/L (ref 1–41)
ANION GAP SERPL CALCULATED.3IONS-SCNC: 10.7 MMOL/L (ref 5–15)
ANISOCYTOSIS BLD QL: NORMAL
ARTERIAL PATENCY WRIST A: ABNORMAL
AST SERPL-CCNC: 17 U/L (ref 1–40)
BACTERIA UR QL AUTO: ABNORMAL /HPF
BASE EXCESS BLDA CALC-SCNC: -0.2 MMOL/L (ref -2–2)
BASOPHILS # BLD AUTO: 0.03 10*3/MM3 (ref 0–0.2)
BASOPHILS NFR BLD AUTO: 0.3 % (ref 0–1.5)
BDY SITE: ABNORMAL
BILIRUB SERPL-MCNC: 1.3 MG/DL (ref 0–1.2)
BILIRUB UR QL STRIP: ABNORMAL
BUN SERPL-MCNC: 19 MG/DL (ref 8–23)
BUN/CREAT SERPL: 12.4 (ref 7–25)
CA-I BLDA-SCNC: 1.05 MMOL/L (ref 1.13–1.32)
CALCIUM SPEC-SCNC: 8.8 MG/DL (ref 8.6–10.5)
CHLORIDE BLDA-SCNC: 95 MMOL/L (ref 98–106)
CHLORIDE SERPL-SCNC: 93 MMOL/L (ref 98–107)
CLARITY UR: ABNORMAL
CO2 SERPL-SCNC: 26.3 MMOL/L (ref 22–29)
COHGB MFR BLD: 2.1 % (ref 0–1.5)
COLOR UR: ABNORMAL
CREAT SERPL-MCNC: 1.53 MG/DL (ref 0.76–1.27)
D-LACTATE SERPL-SCNC: 1.3 MMOL/L (ref 0.5–2)
DEPRECATED RDW RBC AUTO: 46.3 FL (ref 37–54)
EGFRCR SERPLBLD CKD-EPI 2021: 48.6 ML/MIN/1.73
EOSINOPHIL # BLD AUTO: 0.01 10*3/MM3 (ref 0–0.4)
EOSINOPHIL NFR BLD AUTO: 0.1 % (ref 0.3–6.2)
ERYTHROCYTE [DISTWIDTH] IN BLOOD BY AUTOMATED COUNT: 13.4 % (ref 12.3–15.4)
EXPIRATION DATE: NORMAL
EXPIRATION DATE: NORMAL
FHHB: 1.5 % (ref 0–5)
FLUAV AG NPH QL: NEGATIVE
FLUAV AG NPH QL: NEGATIVE
FLUBV AG NPH QL IA: NEGATIVE
FLUBV AG NPH QL: NEGATIVE
GAS FLOW AIRWAY: 4 LPM
GLOBULIN UR ELPH-MCNC: 3.1 GM/DL
GLUCOSE BLDA-MCNC: 151 MG/DL (ref 70–99)
GLUCOSE SERPL-MCNC: 143 MG/DL (ref 65–99)
GLUCOSE UR STRIP-MCNC: NEGATIVE MG/DL
GRAN CASTS URNS QL MICRO: ABNORMAL /LPF
HCO3 BLDA-SCNC: 24.5 MMOL/L (ref 22–26)
HCT VFR BLD AUTO: 39 % (ref 37.5–51)
HGB BLD-MCNC: 14.4 G/DL (ref 13–17.7)
HGB BLDA-MCNC: 14.1 G/DL (ref 13.8–16.4)
HGB UR QL STRIP.AUTO: ABNORMAL
HOLD SPECIMEN: NORMAL
HOLD SPECIMEN: NORMAL
HYALINE CASTS UR QL AUTO: ABNORMAL /LPF
IMM GRANULOCYTES # BLD AUTO: 0.14 10*3/MM3 (ref 0–0.05)
IMM GRANULOCYTES NFR BLD AUTO: 1.2 % (ref 0–0.5)
INHALED O2 CONCENTRATION: ABNORMAL %
INTERNAL CONTROL: NORMAL
INTERNAL CONTROL: NORMAL
KETONES UR QL STRIP: NEGATIVE
LACTATE BLDA-SCNC: 1.94 MMOL/L (ref 0.5–2)
LEUKOCYTE ESTERASE UR QL STRIP.AUTO: ABNORMAL
LYMPHOCYTES # BLD AUTO: 2.26 10*3/MM3 (ref 0.7–3.1)
LYMPHOCYTES NFR BLD AUTO: 19.3 % (ref 19.6–45.3)
Lab: NORMAL
Lab: NORMAL
MAGNESIUM SERPL-MCNC: 1.6 MG/DL (ref 1.6–2.4)
MCH RBC QN AUTO: 34.8 PG (ref 26.6–33)
MCHC RBC AUTO-ENTMCNC: 36.9 G/DL (ref 31.5–35.7)
MCV RBC AUTO: 94.2 FL (ref 79–97)
METHGB BLD QL: 0.2 % (ref 0–1.5)
MODALITY: ABNORMAL
MONOCYTES # BLD AUTO: 1.14 10*3/MM3 (ref 0.1–0.9)
MONOCYTES NFR BLD AUTO: 9.8 % (ref 5–12)
NEUTROPHILS NFR BLD AUTO: 69.3 % (ref 42.7–76)
NEUTROPHILS NFR BLD AUTO: 8.1 10*3/MM3 (ref 1.7–7)
NITRITE UR QL STRIP: NEGATIVE
NOTE: ABNORMAL
NRBC BLD AUTO-RTO: 0 /100 WBC (ref 0–0.2)
NT-PROBNP SERPL-MCNC: 6052 PG/ML (ref 0–900)
OXYHGB MFR BLDV: 96.2 % (ref 94–99)
PCO2 BLDA: 40.6 MM HG (ref 35–45)
PH BLDA: 7.4 PH UNITS (ref 7.35–7.45)
PH UR STRIP.AUTO: 5.5 [PH] (ref 5–8)
PHOSPHATE SERPL-MCNC: 2.5 MG/DL (ref 2.5–4.5)
PLATELET # BLD AUTO: 121 10*3/MM3 (ref 140–450)
PMV BLD AUTO: 9.1 FL (ref 6–12)
PO2 BLD: ABNORMAL MM[HG]
PO2 BLDA: 149.2 MM HG (ref 80–100)
POLYCHROMASIA BLD QL SMEAR: NORMAL
POTASSIUM BLDA-SCNC: 3.76 MMOL/L (ref 3.5–5)
POTASSIUM SERPL-SCNC: 4.1 MMOL/L (ref 3.5–5.2)
PROT SERPL-MCNC: 7.3 G/DL (ref 6–8.5)
PROT UR QL STRIP: ABNORMAL
RBC # BLD AUTO: 4.14 10*6/MM3 (ref 4.14–5.8)
RBC # UR STRIP: ABNORMAL /HPF
REF LAB TEST METHOD: ABNORMAL
SAO2 % BLDCOA: 98.5 % (ref 95–99)
SARS-COV-2 AG UPPER RESP QL IA.RAPID: NOT DETECTED
SMALL PLATELETS BLD QL SMEAR: NORMAL
SODIUM BLDA-SCNC: 128.1 MMOL/L (ref 136–146)
SODIUM SERPL-SCNC: 130 MMOL/L (ref 136–145)
SP GR UR STRIP: 1.02 (ref 1–1.03)
SQUAMOUS #/AREA URNS HPF: ABNORMAL /HPF
TROPONIN T SERPL-MCNC: <0.01 NG/ML (ref 0–0.03)
UROBILINOGEN UR QL STRIP: ABNORMAL
WBC # UR STRIP: ABNORMAL /HPF
WBC MORPH BLD: NORMAL
WBC NRBC COR # BLD: 11.68 10*3/MM3 (ref 3.4–10.8)
WHOLE BLOOD HOLD COAG: NORMAL
WHOLE BLOOD HOLD SPECIMEN: NORMAL

## 2022-10-25 PROCEDURE — 88312 SPECIAL STAINS GROUP 1: CPT | Performed by: EMERGENCY MEDICINE

## 2022-10-25 PROCEDURE — P9612 CATHETERIZE FOR URINE SPEC: HCPCS

## 2022-10-25 PROCEDURE — 87449 NOS EACH ORGANISM AG IA: CPT | Performed by: HOSPITALIST

## 2022-10-25 PROCEDURE — 36600 WITHDRAWAL OF ARTERIAL BLOOD: CPT | Performed by: EMERGENCY MEDICINE

## 2022-10-25 PROCEDURE — 87899 AGENT NOS ASSAY W/OPTIC: CPT | Performed by: HOSPITALIST

## 2022-10-25 PROCEDURE — 71045 X-RAY EXAM CHEST 1 VIEW: CPT

## 2022-10-25 PROCEDURE — 99214 OFFICE O/P EST MOD 30 MIN: CPT | Performed by: NURSE PRACTITIONER

## 2022-10-25 PROCEDURE — 87150 DNA/RNA AMPLIFIED PROBE: CPT | Performed by: EMERGENCY MEDICINE

## 2022-10-25 PROCEDURE — 87804 INFLUENZA ASSAY W/OPTIC: CPT | Performed by: NURSE PRACTITIONER

## 2022-10-25 PROCEDURE — 87147 CULTURE TYPE IMMUNOLOGIC: CPT | Performed by: EMERGENCY MEDICINE

## 2022-10-25 PROCEDURE — 83880 ASSAY OF NATRIURETIC PEPTIDE: CPT | Performed by: EMERGENCY MEDICINE

## 2022-10-25 PROCEDURE — 94640 AIRWAY INHALATION TREATMENT: CPT

## 2022-10-25 PROCEDURE — 94799 UNLISTED PULMONARY SVC/PX: CPT

## 2022-10-25 PROCEDURE — 93005 ELECTROCARDIOGRAM TRACING: CPT | Performed by: EMERGENCY MEDICINE

## 2022-10-25 PROCEDURE — 96372 THER/PROPH/DIAG INJ SC/IM: CPT | Performed by: NURSE PRACTITIONER

## 2022-10-25 PROCEDURE — 71046 X-RAY EXAM CHEST 2 VIEWS: CPT

## 2022-10-25 PROCEDURE — 80053 COMPREHEN METABOLIC PANEL: CPT | Performed by: EMERGENCY MEDICINE

## 2022-10-25 PROCEDURE — 84100 ASSAY OF PHOSPHORUS: CPT | Performed by: HOSPITALIST

## 2022-10-25 PROCEDURE — U0004 COV-19 TEST NON-CDC HGH THRU: HCPCS | Performed by: EMERGENCY MEDICINE

## 2022-10-25 PROCEDURE — 87804 INFLUENZA ASSAY W/OPTIC: CPT | Performed by: EMERGENCY MEDICINE

## 2022-10-25 PROCEDURE — 25010000002 CEFEPIME PER 500 MG: Performed by: EMERGENCY MEDICINE

## 2022-10-25 PROCEDURE — 85007 BL SMEAR W/DIFF WBC COUNT: CPT | Performed by: EMERGENCY MEDICINE

## 2022-10-25 PROCEDURE — 99285 EMERGENCY DEPT VISIT HI MDM: CPT

## 2022-10-25 PROCEDURE — 99223 1ST HOSP IP/OBS HIGH 75: CPT | Performed by: HOSPITALIST

## 2022-10-25 PROCEDURE — 83605 ASSAY OF LACTIC ACID: CPT | Performed by: EMERGENCY MEDICINE

## 2022-10-25 PROCEDURE — 82375 ASSAY CARBOXYHB QUANT: CPT | Performed by: EMERGENCY MEDICINE

## 2022-10-25 PROCEDURE — 94664 DEMO&/EVAL PT USE INHALER: CPT

## 2022-10-25 PROCEDURE — 87086 URINE CULTURE/COLONY COUNT: CPT | Performed by: EMERGENCY MEDICINE

## 2022-10-25 PROCEDURE — 82805 BLOOD GASES W/O2 SATURATION: CPT | Performed by: EMERGENCY MEDICINE

## 2022-10-25 PROCEDURE — 87426 SARSCOV CORONAVIRUS AG IA: CPT | Performed by: NURSE PRACTITIONER

## 2022-10-25 PROCEDURE — 36415 COLL VENOUS BLD VENIPUNCTURE: CPT

## 2022-10-25 PROCEDURE — 83050 HGB METHEMOGLOBIN QUAN: CPT | Performed by: EMERGENCY MEDICINE

## 2022-10-25 PROCEDURE — 93005 ELECTROCARDIOGRAM TRACING: CPT

## 2022-10-25 PROCEDURE — 83735 ASSAY OF MAGNESIUM: CPT | Performed by: HOSPITALIST

## 2022-10-25 PROCEDURE — 85025 COMPLETE CBC W/AUTO DIFF WBC: CPT | Performed by: EMERGENCY MEDICINE

## 2022-10-25 PROCEDURE — 81001 URINALYSIS AUTO W/SCOPE: CPT | Performed by: EMERGENCY MEDICINE

## 2022-10-25 PROCEDURE — 87040 BLOOD CULTURE FOR BACTERIA: CPT | Performed by: EMERGENCY MEDICINE

## 2022-10-25 PROCEDURE — 87040 BLOOD CULTURE FOR BACTERIA: CPT

## 2022-10-25 PROCEDURE — 84484 ASSAY OF TROPONIN QUANT: CPT | Performed by: EMERGENCY MEDICINE

## 2022-10-25 RX ORDER — GUAIFENESIN 600 MG/1
1200 TABLET, EXTENDED RELEASE ORAL EVERY 12 HOURS SCHEDULED
Status: DISCONTINUED | OUTPATIENT
Start: 2022-10-25 | End: 2022-10-31 | Stop reason: HOSPADM

## 2022-10-25 RX ORDER — ALPRAZOLAM 0.25 MG/1
0.5 TABLET ORAL EVERY 8 HOURS PRN
Status: DISCONTINUED | OUTPATIENT
Start: 2022-10-25 | End: 2022-10-25 | Stop reason: SDUPTHER

## 2022-10-25 RX ORDER — MORPHINE SULFATE 2 MG/ML
1 INJECTION, SOLUTION INTRAMUSCULAR; INTRAVENOUS EVERY 4 HOURS PRN
Status: DISCONTINUED | OUTPATIENT
Start: 2022-10-25 | End: 2022-10-26

## 2022-10-25 RX ORDER — ACETAMINOPHEN 160 MG/5ML
650 SOLUTION ORAL EVERY 4 HOURS PRN
Status: DISCONTINUED | OUTPATIENT
Start: 2022-10-25 | End: 2022-10-31 | Stop reason: HOSPADM

## 2022-10-25 RX ORDER — IPRATROPIUM BROMIDE AND ALBUTEROL SULFATE 2.5; .5 MG/3ML; MG/3ML
6 SOLUTION RESPIRATORY (INHALATION) ONCE
Status: COMPLETED | OUTPATIENT
Start: 2022-10-25 | End: 2022-10-25

## 2022-10-25 RX ORDER — ENOXAPARIN SODIUM 100 MG/ML
40 INJECTION SUBCUTANEOUS DAILY
Status: DISCONTINUED | OUTPATIENT
Start: 2022-10-25 | End: 2022-10-31 | Stop reason: HOSPADM

## 2022-10-25 RX ORDER — METHYLPREDNISOLONE SODIUM SUCCINATE 40 MG/ML
40 INJECTION, POWDER, LYOPHILIZED, FOR SOLUTION INTRAMUSCULAR; INTRAVENOUS EVERY 24 HOURS
Status: DISCONTINUED | OUTPATIENT
Start: 2022-10-25 | End: 2022-10-26

## 2022-10-25 RX ORDER — TRIAMCINOLONE ACETONIDE 40 MG/ML
60 INJECTION, SUSPENSION INTRA-ARTICULAR; INTRAMUSCULAR ONCE
Status: DISCONTINUED | OUTPATIENT
Start: 2022-10-25 | End: 2022-10-25 | Stop reason: HOSPADM

## 2022-10-25 RX ORDER — NALOXONE HCL 0.4 MG/ML
0.4 VIAL (ML) INJECTION
Status: DISCONTINUED | OUTPATIENT
Start: 2022-10-25 | End: 2022-10-26

## 2022-10-25 RX ORDER — BISACODYL 5 MG/1
5 TABLET, DELAYED RELEASE ORAL DAILY PRN
Status: DISCONTINUED | OUTPATIENT
Start: 2022-10-25 | End: 2022-10-31 | Stop reason: HOSPADM

## 2022-10-25 RX ORDER — CHOLECALCIFEROL (VITAMIN D3) 125 MCG
5 CAPSULE ORAL NIGHTLY PRN
Status: DISCONTINUED | OUTPATIENT
Start: 2022-10-25 | End: 2022-10-31 | Stop reason: HOSPADM

## 2022-10-25 RX ORDER — ALBUTEROL SULFATE 90 UG/1
2 AEROSOL, METERED RESPIRATORY (INHALATION) EVERY 4 HOURS PRN
Qty: 1 G | Refills: 5 | Status: SHIPPED | OUTPATIENT
Start: 2022-10-25

## 2022-10-25 RX ORDER — ROSUVASTATIN CALCIUM 5 MG/1
5 TABLET, COATED ORAL DAILY
Status: DISCONTINUED | OUTPATIENT
Start: 2022-10-26 | End: 2022-10-31 | Stop reason: HOSPADM

## 2022-10-25 RX ORDER — BUDESONIDE 0.5 MG/2ML
0.5 INHALANT ORAL
Status: DISCONTINUED | OUTPATIENT
Start: 2022-10-25 | End: 2022-10-31 | Stop reason: HOSPADM

## 2022-10-25 RX ORDER — SODIUM CHLORIDE 0.9 % (FLUSH) 0.9 %
10 SYRINGE (ML) INJECTION AS NEEDED
Status: DISCONTINUED | OUTPATIENT
Start: 2022-10-25 | End: 2022-10-31 | Stop reason: HOSPADM

## 2022-10-25 RX ORDER — POLYETHYLENE GLYCOL 3350 17 G/17G
17 POWDER, FOR SOLUTION ORAL DAILY PRN
Status: DISCONTINUED | OUTPATIENT
Start: 2022-10-25 | End: 2022-10-31 | Stop reason: HOSPADM

## 2022-10-25 RX ORDER — ALBUTEROL SULFATE 1.25 MG/3ML
1 SOLUTION RESPIRATORY (INHALATION) EVERY 6 HOURS PRN
Qty: 1 EACH | Refills: 12 | Status: SHIPPED | OUTPATIENT
Start: 2022-10-25 | End: 2022-10-31 | Stop reason: HOSPADM

## 2022-10-25 RX ORDER — AMOXICILLIN 250 MG
2 CAPSULE ORAL 2 TIMES DAILY
Status: DISCONTINUED | OUTPATIENT
Start: 2022-10-25 | End: 2022-10-31 | Stop reason: HOSPADM

## 2022-10-25 RX ORDER — SODIUM CHLORIDE 0.9 % (FLUSH) 0.9 %
10 SYRINGE (ML) INJECTION EVERY 12 HOURS SCHEDULED
Status: DISCONTINUED | OUTPATIENT
Start: 2022-10-25 | End: 2022-10-31 | Stop reason: HOSPADM

## 2022-10-25 RX ORDER — SODIUM CHLORIDE 9 MG/ML
100 INJECTION, SOLUTION INTRAVENOUS CONTINUOUS
Status: DISCONTINUED | OUTPATIENT
Start: 2022-10-25 | End: 2022-10-26

## 2022-10-25 RX ORDER — IPRATROPIUM BROMIDE AND ALBUTEROL SULFATE 2.5; .5 MG/3ML; MG/3ML
3 SOLUTION RESPIRATORY (INHALATION)
Status: DISCONTINUED | OUTPATIENT
Start: 2022-10-25 | End: 2022-10-31 | Stop reason: HOSPADM

## 2022-10-25 RX ORDER — PREDNISONE 20 MG/1
40 TABLET ORAL DAILY
Qty: 10 TABLET | Refills: 0 | Status: SHIPPED | OUTPATIENT
Start: 2022-10-25 | End: 2022-10-31 | Stop reason: HOSPADM

## 2022-10-25 RX ORDER — ONDANSETRON 2 MG/ML
4 INJECTION INTRAMUSCULAR; INTRAVENOUS EVERY 6 HOURS PRN
Status: DISCONTINUED | OUTPATIENT
Start: 2022-10-25 | End: 2022-10-31 | Stop reason: HOSPADM

## 2022-10-25 RX ORDER — ACETAMINOPHEN 650 MG/1
650 SUPPOSITORY RECTAL EVERY 4 HOURS PRN
Status: DISCONTINUED | OUTPATIENT
Start: 2022-10-25 | End: 2022-10-31 | Stop reason: HOSPADM

## 2022-10-25 RX ORDER — METOPROLOL SUCCINATE 25 MG/1
25 TABLET, EXTENDED RELEASE ORAL DAILY
Status: DISCONTINUED | OUTPATIENT
Start: 2022-10-26 | End: 2022-10-31 | Stop reason: HOSPADM

## 2022-10-25 RX ORDER — MAGNESIUM SULFATE HEPTAHYDRATE 40 MG/ML
2 INJECTION, SOLUTION INTRAVENOUS ONCE
Status: COMPLETED | OUTPATIENT
Start: 2022-10-25 | End: 2022-10-26

## 2022-10-25 RX ORDER — ARFORMOTEROL TARTRATE 15 UG/2ML
15 SOLUTION RESPIRATORY (INHALATION)
Status: DISCONTINUED | OUTPATIENT
Start: 2022-10-25 | End: 2022-10-31 | Stop reason: HOSPADM

## 2022-10-25 RX ORDER — ONDANSETRON 4 MG/1
4 TABLET, FILM COATED ORAL EVERY 6 HOURS PRN
Status: DISCONTINUED | OUTPATIENT
Start: 2022-10-25 | End: 2022-10-31 | Stop reason: HOSPADM

## 2022-10-25 RX ORDER — ACETAMINOPHEN 500 MG
1000 TABLET ORAL ONCE
Status: COMPLETED | OUTPATIENT
Start: 2022-10-25 | End: 2022-10-25

## 2022-10-25 RX ORDER — HYDROCODONE BITARTRATE AND ACETAMINOPHEN 5; 325 MG/1; MG/1
1 TABLET ORAL EVERY 4 HOURS PRN
Status: DISCONTINUED | OUTPATIENT
Start: 2022-10-25 | End: 2022-10-31 | Stop reason: HOSPADM

## 2022-10-25 RX ORDER — ACETAMINOPHEN 325 MG/1
650 TABLET ORAL EVERY 4 HOURS PRN
Status: DISCONTINUED | OUTPATIENT
Start: 2022-10-25 | End: 2022-10-31 | Stop reason: HOSPADM

## 2022-10-25 RX ORDER — IPRATROPIUM BROMIDE AND ALBUTEROL SULFATE 2.5; .5 MG/3ML; MG/3ML
3 SOLUTION RESPIRATORY (INHALATION)
Status: COMPLETED | OUTPATIENT
Start: 2022-10-25 | End: 2022-10-25

## 2022-10-25 RX ORDER — METHYLPREDNISOLONE SODIUM SUCCINATE 125 MG/2ML
125 INJECTION, POWDER, LYOPHILIZED, FOR SOLUTION INTRAMUSCULAR; INTRAVENOUS ONCE
Status: DISCONTINUED | OUTPATIENT
Start: 2022-10-25 | End: 2022-10-25

## 2022-10-25 RX ORDER — BISACODYL 10 MG
10 SUPPOSITORY, RECTAL RECTAL DAILY PRN
Status: DISCONTINUED | OUTPATIENT
Start: 2022-10-25 | End: 2022-10-31 | Stop reason: HOSPADM

## 2022-10-25 RX ORDER — ALPRAZOLAM 0.25 MG/1
0.25 TABLET ORAL NIGHTLY PRN
Status: DISCONTINUED | OUTPATIENT
Start: 2022-10-25 | End: 2022-10-31 | Stop reason: HOSPADM

## 2022-10-25 RX ORDER — NITROGLYCERIN 0.4 MG/1
0.4 TABLET SUBLINGUAL
Status: DISCONTINUED | OUTPATIENT
Start: 2022-10-25 | End: 2022-10-31 | Stop reason: HOSPADM

## 2022-10-25 RX ORDER — LEVOFLOXACIN 750 MG/1
750 TABLET ORAL DAILY
Qty: 5 TABLET | Refills: 0 | Status: SHIPPED | OUTPATIENT
Start: 2022-10-25 | End: 2022-10-31 | Stop reason: HOSPADM

## 2022-10-25 RX ORDER — HYDROCODONE BITARTRATE AND ACETAMINOPHEN 7.5; 325 MG/1; MG/1
2 TABLET ORAL EVERY 4 HOURS PRN
Status: DISCONTINUED | OUTPATIENT
Start: 2022-10-25 | End: 2022-10-31 | Stop reason: HOSPADM

## 2022-10-25 RX ORDER — PANTOPRAZOLE SODIUM 40 MG/1
40 TABLET, DELAYED RELEASE ORAL DAILY
Status: DISCONTINUED | OUTPATIENT
Start: 2022-10-26 | End: 2022-10-31 | Stop reason: HOSPADM

## 2022-10-25 RX ORDER — CEFTRIAXONE 1 G/1
1 INJECTION, POWDER, FOR SOLUTION INTRAMUSCULAR; INTRAVENOUS EVERY 24 HOURS
Status: DISCONTINUED | OUTPATIENT
Start: 2022-10-25 | End: 2022-10-25 | Stop reason: HOSPADM

## 2022-10-25 RX ORDER — SODIUM CHLORIDE 9 MG/ML
40 INJECTION, SOLUTION INTRAVENOUS AS NEEDED
Status: DISCONTINUED | OUTPATIENT
Start: 2022-10-25 | End: 2022-10-31 | Stop reason: HOSPADM

## 2022-10-25 RX ADMIN — CEFEPIME HYDROCHLORIDE 2 G: 2 INJECTION, POWDER, FOR SOLUTION INTRAMUSCULAR; INTRAVENOUS at 20:18

## 2022-10-25 RX ADMIN — IPRATROPIUM BROMIDE AND ALBUTEROL SULFATE 3 ML: 2.5; .5 SOLUTION RESPIRATORY (INHALATION) at 19:30

## 2022-10-25 RX ADMIN — IPRATROPIUM BROMIDE AND ALBUTEROL SULFATE 6 ML: 2.5; .5 SOLUTION RESPIRATORY (INHALATION) at 17:45

## 2022-10-25 RX ADMIN — IPRATROPIUM BROMIDE AND ALBUTEROL SULFATE 3 ML: 2.5; .5 SOLUTION RESPIRATORY (INHALATION) at 19:35

## 2022-10-25 RX ADMIN — CEFTRIAXONE 1 G: 1 INJECTION, POWDER, FOR SOLUTION INTRAMUSCULAR; INTRAVENOUS at 14:50

## 2022-10-25 RX ADMIN — IPRATROPIUM BROMIDE AND ALBUTEROL SULFATE 3 ML: 2.5; .5 SOLUTION RESPIRATORY (INHALATION) at 19:40

## 2022-10-25 RX ADMIN — TRIAMCINOLONE ACETONIDE 60 MG: 40 INJECTION, SUSPENSION INTRA-ARTICULAR; INTRAMUSCULAR at 14:49

## 2022-10-25 RX ADMIN — ACETAMINOPHEN 1000 MG: 500 TABLET, FILM COATED ORAL at 17:27

## 2022-10-25 RX ADMIN — DOXYCYCLINE 100 MG: 100 INJECTION, POWDER, LYOPHILIZED, FOR SOLUTION INTRAVENOUS at 20:57

## 2022-10-25 NOTE — ASSESSMENT & PLAN NOTE
COPD is worsening.  Medication changes per orders.   CXR   Prednisone 40mg PO qd x 5 days   Levaquin 750mg PO qd x 5 days   Nebulizer treatments qid   Albuterol inhaler UAD prn   Strong precautions to proceed directly to ED if S/S worsen or become severe, pt and caregiver agree to POC and verb understanding.

## 2022-10-25 NOTE — PROGRESS NOTES
"Chief Complaint  chest congestion (Symptoms started two weeks ago), Nasal Congestion, Shortness of Breath, Vomiting (Started 2 days ago), and Chills    Subjective        Cm Flores presents to [facility]  History of Present Illness  Pt presents with chest congestion, nasal congestion, SOB, vomiting x 1, and chills x 2 weeks. Does not check temperature at home so unsure if he has had a fever. Denies HA, chest pain, dizziness, HA, abd pain, back pain or other. Pt with COPD. Using Breztri inhaler only. Does not have rescue inhaler. Does not go to pulmonology. Former smoker.     Reviewed all recent labs and medications.      Objective   Vital Signs:  /44   Pulse 112   Temp 100 °F (37.8 °C) (Temporal)   Ht 177.8 cm (70\")   Wt 68 kg (149 lb 14.4 oz)   SpO2 91% Comment: 2 L oxygen  BMI 21.51 kg/m²   Estimated body mass index is 21.51 kg/m² as calculated from the following:    Height as of this encounter: 177.8 cm (70\").    Weight as of this encounter: 68 kg (149 lb 14.4 oz).    BMI is within normal parameters. No other follow-up for BMI required.      Physical Exam  Vitals reviewed.   Constitutional:       General: He is not in acute distress.  HENT:      Head: Normocephalic.      Right Ear: Tympanic membrane normal.      Left Ear: Tympanic membrane normal.      Nose: Nose normal.      Mouth/Throat:      Pharynx: Oropharynx is clear. No posterior oropharyngeal erythema.   Eyes:      General: No scleral icterus.     Extraocular Movements: Extraocular movements intact.      Conjunctiva/sclera: Conjunctivae normal.      Pupils: Pupils are equal, round, and reactive to light.   Cardiovascular:      Rate and Rhythm: Normal rate and regular rhythm.      Pulses: Normal pulses.      Heart sounds: Normal heart sounds.   Pulmonary:      Effort: Pulmonary effort is normal.      Breath sounds: Decreased air movement present. Examination of the right-upper field reveals wheezing. " Examination of the left-upper field reveals wheezing. Examination of the right-middle field reveals wheezing. Examination of the right-lower field reveals wheezing. Examination of the left-lower field reveals wheezing. Wheezing present.      Comments: insp and exp wheezing throughout   Abdominal:      General: Bowel sounds are normal.      Palpations: Abdomen is soft.   Musculoskeletal:         General: Normal range of motion.      Cervical back: Neck supple.   Skin:     General: Skin is warm and dry.   Neurological:      Mental Status: He is alert and oriented to person, place, and time.   Psychiatric:         Mood and Affect: Mood normal.         Behavior: Behavior normal.         Thought Content: Thought content normal.         Judgment: Judgment normal.        Result Review :    Common labs    Common Labs 1/25/22 3/18/22 3/24/22   Glucose   113 (A)   BUN   14   Creatinine   1.23   Sodium   131 (A)   Potassium   3.9   Chloride   91 (A)   Calcium   8.8   Albumin   3.40 (A)   Total Bilirubin   1.9 (A)   Alkaline Phosphatase   55   AST (SGOT)   15   ALT (SGPT)   8   WBC 6.93 6.56    Hemoglobin 14.9 13.3    Hematocrit 44.6 38.7    Platelets 86 (A) 153    (A) Abnormal value            Data reviewed: Radiologic studies CT abd / pelvis 4/2022          Assessment and Plan   Diagnoses and all orders for this visit:    1. Nasal congestion (Primary)  Comments:  rapid covid / rapid flu negative   Orders:  -     POCT Influenza A/B  -     POCT SARS-CoV-2 Antigen EMMANUEL    2. Chest congestion  Comments:  CXR ordered   Orders:  -     POCT Influenza A/B  -     POCT SARS-CoV-2 Antigen EMMANUEL  -     XR Chest PA & Lateral; Future    3. SOB (shortness of breath)  -     POCT Influenza A/B  -     POCT SARS-CoV-2 Antigen EMMANUEL    4. Nausea and vomiting, unspecified vomiting type  -     POCT Influenza A/B  -     POCT SARS-CoV-2 Antigen EMMANUEL    5. Chills  -     POCT Influenza A/B  -     POCT SARS-CoV-2 Antigen EMMANUEL    6. COPD, moderate  ([facility])  Assessment & Plan:  COPD is worsening.  Medication changes per orders.        Orders:  -     triamcinolone acetonide (KENALOG-40) injection 60 mg  -     cefTRIAXone (ROCEPHIN) injection 1 g  -     Home Nebulizer  -     Home Nebulizer Accessories    7. Essential hypertension  Assessment & Plan:  Hypertension is improving with treatment.  Continue current treatment regimen.  Dietary sodium restriction.  Regular aerobic exercise.  Blood pressure will be reassessed at the next regular appointment.      8. COPD with exacerbation ([facility])  Comments:  rocephin 1gm IM inj   kenalog 60mg IM inj     Assessment & Plan:  COPD is worsening.  Medication changes per orders.   CXR   Prednisone 40mg PO qd x 5 days   Levaquin 750mg PO qd x 5 days   Nebulizer treatments qid   Albuterol inhaler UAD prn   Strong precautions to proceed directly to ED if S/S worsen or become severe, pt and caregiver agree to POC and verb understanding.         Orders:  -     Ambulatory Referral to Pulmonology  -     XR Chest PA & Lateral; Future    9. History of smoking    Other orders  -     levoFLOXacin (Levaquin) 750 MG tablet; Take 1 tablet by mouth Daily for 5 days.  Dispense: 5 tablet; Refill: 0  -     albuterol (ACCUNEB) 1.25 MG/3ML nebulizer solution; Take 3 mL by nebulization Every 6 (Six) Hours As Needed for Wheezing.  Dispense: 1 each; Refill: 12  -     albuterol sulfate  (90 Base) MCG/ACT inhaler; Inhale 2 puffs Every 4 (Four) Hours As Needed for Wheezing.  Dispense: 1 g; Refill: 5  -     predniSONE (DELTASONE) 20 MG tablet; Take 2 tablets by mouth Daily for 5 days.  Dispense: 10 tablet; Refill: 0           Follow Up   Return if symptoms worsen or fail to improve.  Patient was given instructions and counseling regarding his condition or for health maintenance advice. Please see specific information pulled into the AVS if appropriate.

## 2022-10-26 ENCOUNTER — APPOINTMENT (OUTPATIENT)
Dept: ULTRASOUND IMAGING | Facility: HOSPITAL | Age: 72
End: 2022-10-26

## 2022-10-26 LAB
ANION GAP SERPL CALCULATED.3IONS-SCNC: 15.8 MMOL/L (ref 5–15)
BACTERIA SPEC AEROBE CULT: NO GROWTH
BASOPHILS # BLD AUTO: 0.01 10*3/MM3 (ref 0–0.2)
BASOPHILS NFR BLD AUTO: 0.1 % (ref 0–1.5)
BUN SERPL-MCNC: 22 MG/DL (ref 8–23)
BUN/CREAT SERPL: 13.7 (ref 7–25)
CALCIUM SPEC-SCNC: 8.7 MG/DL (ref 8.6–10.5)
CHLORIDE SERPL-SCNC: 95 MMOL/L (ref 98–107)
CO2 SERPL-SCNC: 23.2 MMOL/L (ref 22–29)
CREAT SERPL-MCNC: 1.61 MG/DL (ref 0.76–1.27)
DEPRECATED RDW RBC AUTO: 49 FL (ref 37–54)
EGFRCR SERPLBLD CKD-EPI 2021: 45.7 ML/MIN/1.73
EOSINOPHIL # BLD AUTO: 0 10*3/MM3 (ref 0–0.4)
EOSINOPHIL NFR BLD AUTO: 0 % (ref 0.3–6.2)
ERYTHROCYTE [DISTWIDTH] IN BLOOD BY AUTOMATED COUNT: 13.6 % (ref 12.3–15.4)
GLUCOSE BLDC GLUCOMTR-MCNC: 201 MG/DL (ref 70–99)
GLUCOSE BLDC GLUCOMTR-MCNC: 203 MG/DL (ref 70–99)
GLUCOSE BLDC GLUCOMTR-MCNC: 207 MG/DL (ref 70–99)
GLUCOSE BLDC GLUCOMTR-MCNC: 245 MG/DL (ref 70–99)
GLUCOSE SERPL-MCNC: 208 MG/DL (ref 65–99)
HCT VFR BLD AUTO: 38.4 % (ref 37.5–51)
HGB BLD-MCNC: 13.4 G/DL (ref 13–17.7)
IMM GRANULOCYTES # BLD AUTO: 0.07 10*3/MM3 (ref 0–0.05)
IMM GRANULOCYTES NFR BLD AUTO: 1 % (ref 0–0.5)
L PNEUMO1 AG UR QL IA: NEGATIVE
LYMPHOCYTES # BLD AUTO: 0.87 10*3/MM3 (ref 0.7–3.1)
LYMPHOCYTES NFR BLD AUTO: 12.5 % (ref 19.6–45.3)
MAGNESIUM SERPL-MCNC: 1.8 MG/DL (ref 1.6–2.4)
MCH RBC QN AUTO: 34 PG (ref 26.6–33)
MCHC RBC AUTO-ENTMCNC: 34.9 G/DL (ref 31.5–35.7)
MCV RBC AUTO: 97.5 FL (ref 79–97)
MONOCYTES # BLD AUTO: 0.43 10*3/MM3 (ref 0.1–0.9)
MONOCYTES NFR BLD AUTO: 6.2 % (ref 5–12)
NEUTROPHILS NFR BLD AUTO: 5.59 10*3/MM3 (ref 1.7–7)
NEUTROPHILS NFR BLD AUTO: 80.2 % (ref 42.7–76)
NRBC BLD AUTO-RTO: 0 /100 WBC (ref 0–0.2)
PHOSPHATE SERPL-MCNC: 3.2 MG/DL (ref 2.5–4.5)
PLATELET # BLD AUTO: 104 10*3/MM3 (ref 140–450)
PMV BLD AUTO: 9.2 FL (ref 6–12)
POTASSIUM SERPL-SCNC: 3.4 MMOL/L (ref 3.5–5.2)
PROCALCITONIN SERPL-MCNC: 0.95 NG/ML (ref 0–0.25)
RBC # BLD AUTO: 3.94 10*6/MM3 (ref 4.14–5.8)
S PNEUM AG SPEC QL LA: NEGATIVE
SARS-COV-2 RNA PNL SPEC NAA+PROBE: NOT DETECTED
SODIUM SERPL-SCNC: 134 MMOL/L (ref 136–145)
WBC NRBC COR # BLD: 6.97 10*3/MM3 (ref 3.4–10.8)

## 2022-10-26 PROCEDURE — 94799 UNLISTED PULMONARY SVC/PX: CPT

## 2022-10-26 PROCEDURE — 36415 COLL VENOUS BLD VENIPUNCTURE: CPT | Performed by: HOSPITALIST

## 2022-10-26 PROCEDURE — 84100 ASSAY OF PHOSPHORUS: CPT | Performed by: HOSPITALIST

## 2022-10-26 PROCEDURE — 97165 OT EVAL LOW COMPLEX 30 MIN: CPT

## 2022-10-26 PROCEDURE — 25010000002 METHYLPREDNISOLONE PER 125 MG: Performed by: STUDENT IN AN ORGANIZED HEALTH CARE EDUCATION/TRAINING PROGRAM

## 2022-10-26 PROCEDURE — 97161 PT EVAL LOW COMPLEX 20 MIN: CPT

## 2022-10-26 PROCEDURE — 85025 COMPLETE CBC W/AUTO DIFF WBC: CPT | Performed by: HOSPITALIST

## 2022-10-26 PROCEDURE — 25010000002 MAGNESIUM SULFATE 2 GM/50ML SOLUTION: Performed by: HOSPITALIST

## 2022-10-26 PROCEDURE — 80048 BASIC METABOLIC PNL TOTAL CA: CPT | Performed by: HOSPITALIST

## 2022-10-26 PROCEDURE — 76775 US EXAM ABDO BACK WALL LIM: CPT

## 2022-10-26 PROCEDURE — 87070 CULTURE OTHR SPECIMN AEROBIC: CPT | Performed by: HOSPITALIST

## 2022-10-26 PROCEDURE — 87205 SMEAR GRAM STAIN: CPT | Performed by: HOSPITALIST

## 2022-10-26 PROCEDURE — 82962 GLUCOSE BLOOD TEST: CPT

## 2022-10-26 PROCEDURE — 83735 ASSAY OF MAGNESIUM: CPT | Performed by: HOSPITALIST

## 2022-10-26 PROCEDURE — 25010000002 METHYLPREDNISOLONE PER 40 MG: Performed by: HOSPITALIST

## 2022-10-26 PROCEDURE — 99233 SBSQ HOSP IP/OBS HIGH 50: CPT | Performed by: STUDENT IN AN ORGANIZED HEALTH CARE EDUCATION/TRAINING PROGRAM

## 2022-10-26 PROCEDURE — 25010000002 CEFEPIME PER 500 MG: Performed by: HOSPITALIST

## 2022-10-26 PROCEDURE — 94761 N-INVAS EAR/PLS OXIMETRY MLT: CPT

## 2022-10-26 PROCEDURE — 25010000002 ENOXAPARIN PER 10 MG: Performed by: HOSPITALIST

## 2022-10-26 PROCEDURE — 84145 PROCALCITONIN (PCT): CPT | Performed by: HOSPITALIST

## 2022-10-26 RX ORDER — METHYLPREDNISOLONE SODIUM SUCCINATE 125 MG/2ML
60 INJECTION, POWDER, LYOPHILIZED, FOR SOLUTION INTRAMUSCULAR; INTRAVENOUS EVERY 8 HOURS
Status: DISCONTINUED | OUTPATIENT
Start: 2022-10-26 | End: 2022-10-28

## 2022-10-26 RX ORDER — POTASSIUM CHLORIDE 750 MG/1
40 CAPSULE, EXTENDED RELEASE ORAL ONCE
Status: COMPLETED | OUTPATIENT
Start: 2022-10-26 | End: 2022-10-26

## 2022-10-26 RX ORDER — NICOTINE 21 MG/24HR
1 PATCH, TRANSDERMAL 24 HOURS TRANSDERMAL
Status: DISCONTINUED | OUTPATIENT
Start: 2022-10-26 | End: 2022-10-31 | Stop reason: HOSPADM

## 2022-10-26 RX ORDER — DOXYCYCLINE 100 MG/1
100 CAPSULE ORAL EVERY 12 HOURS SCHEDULED
Status: DISCONTINUED | OUTPATIENT
Start: 2022-10-26 | End: 2022-10-27

## 2022-10-26 RX ADMIN — METHYLPREDNISOLONE SODIUM SUCCINATE 60 MG: 125 INJECTION, POWDER, FOR SOLUTION INTRAMUSCULAR; INTRAVENOUS at 11:22

## 2022-10-26 RX ADMIN — SENNOSIDES AND DOCUSATE SODIUM 2 TABLET: 8.6; 5 TABLET ORAL at 00:56

## 2022-10-26 RX ADMIN — NICOTINE 1 PATCH: 21 PATCH, EXTENDED RELEASE TRANSDERMAL at 15:32

## 2022-10-26 RX ADMIN — METHYLPREDNISOLONE SODIUM SUCCINATE 60 MG: 125 INJECTION, POWDER, FOR SOLUTION INTRAMUSCULAR; INTRAVENOUS at 20:20

## 2022-10-26 RX ADMIN — GUAIFENESIN 1200 MG: 600 TABLET ORAL at 20:20

## 2022-10-26 RX ADMIN — IPRATROPIUM BROMIDE AND ALBUTEROL SULFATE 3 ML: 2.5; .5 SOLUTION RESPIRATORY (INHALATION) at 13:25

## 2022-10-26 RX ADMIN — CEFEPIME HYDROCHLORIDE 2 G: 2 INJECTION, POWDER, FOR SOLUTION INTRAMUSCULAR; INTRAVENOUS at 07:42

## 2022-10-26 RX ADMIN — ARFORMOTEROL TARTRATE 15 MCG: 15 SOLUTION RESPIRATORY (INHALATION) at 07:11

## 2022-10-26 RX ADMIN — ALPRAZOLAM 0.25 MG: 0.25 TABLET ORAL at 20:20

## 2022-10-26 RX ADMIN — ENOXAPARIN SODIUM 40 MG: 100 INJECTION SUBCUTANEOUS at 00:56

## 2022-10-26 RX ADMIN — BUDESONIDE 0.5 MG: 0.5 INHALANT ORAL at 07:11

## 2022-10-26 RX ADMIN — ROSUVASTATIN CALCIUM 5 MG: 5 TABLET, FILM COATED ORAL at 08:54

## 2022-10-26 RX ADMIN — IPRATROPIUM BROMIDE AND ALBUTEROL SULFATE 3 ML: 2.5; .5 SOLUTION RESPIRATORY (INHALATION) at 07:11

## 2022-10-26 RX ADMIN — Medication 10 ML: at 01:26

## 2022-10-26 RX ADMIN — PANTOPRAZOLE SODIUM 40 MG: 40 TABLET, DELAYED RELEASE ORAL at 08:55

## 2022-10-26 RX ADMIN — POTASSIUM CHLORIDE 40 MEQ: 10 CAPSULE, COATED, EXTENDED RELEASE ORAL at 08:53

## 2022-10-26 RX ADMIN — DOXYCYCLINE 100 MG: 100 INJECTION, POWDER, LYOPHILIZED, FOR SOLUTION INTRAVENOUS at 09:55

## 2022-10-26 RX ADMIN — SODIUM CHLORIDE 100 ML/HR: 9 INJECTION, SOLUTION INTRAVENOUS at 00:55

## 2022-10-26 RX ADMIN — MAGNESIUM SULFATE HEPTAHYDRATE 2 G: 2 INJECTION, SOLUTION INTRAVENOUS at 01:09

## 2022-10-26 RX ADMIN — METHYLPREDNISOLONE SODIUM SUCCINATE 40 MG: 40 INJECTION, POWDER, FOR SOLUTION INTRAMUSCULAR; INTRAVENOUS at 00:55

## 2022-10-26 RX ADMIN — DOXYCYCLINE 100 MG: 100 CAPSULE ORAL at 20:20

## 2022-10-26 RX ADMIN — GUAIFENESIN 1200 MG: 600 TABLET ORAL at 08:53

## 2022-10-26 RX ADMIN — Medication 10 ML: at 20:21

## 2022-10-26 RX ADMIN — IPRATROPIUM BROMIDE AND ALBUTEROL SULFATE 3 ML: 2.5; .5 SOLUTION RESPIRATORY (INHALATION) at 00:41

## 2022-10-26 RX ADMIN — CEFEPIME HYDROCHLORIDE 2 G: 2 INJECTION, POWDER, FOR SOLUTION INTRAMUSCULAR; INTRAVENOUS at 20:20

## 2022-10-26 RX ADMIN — GUAIFENESIN 1200 MG: 600 TABLET ORAL at 01:09

## 2022-10-26 RX ADMIN — METOPROLOL SUCCINATE 25 MG: 25 TABLET, EXTENDED RELEASE ORAL at 08:55

## 2022-10-27 ENCOUNTER — APPOINTMENT (OUTPATIENT)
Dept: CT IMAGING | Facility: HOSPITAL | Age: 72
End: 2022-10-27

## 2022-10-27 ENCOUNTER — APPOINTMENT (OUTPATIENT)
Dept: CARDIOLOGY | Facility: HOSPITAL | Age: 72
End: 2022-10-27

## 2022-10-27 LAB
ALBUMIN SERPL-MCNC: 3.5 G/DL (ref 3.5–5.2)
ALBUMIN/GLOB SERPL: 1.1 G/DL
ALP SERPL-CCNC: 54 U/L (ref 39–117)
ALT SERPL W P-5'-P-CCNC: 6 U/L (ref 1–41)
ANION GAP SERPL CALCULATED.3IONS-SCNC: 11.1 MMOL/L (ref 5–15)
AST SERPL-CCNC: 15 U/L (ref 1–40)
BACTERIA BLD CULT: ABNORMAL
BASOPHILS # BLD AUTO: 0.02 10*3/MM3 (ref 0–0.2)
BASOPHILS NFR BLD AUTO: 0.3 % (ref 0–1.5)
BILIRUB SERPL-MCNC: 0.4 MG/DL (ref 0–1.2)
BOTTLE TYPE: ABNORMAL
BUN SERPL-MCNC: 32 MG/DL (ref 8–23)
BUN/CREAT SERPL: 23.7 (ref 7–25)
CALCIUM SPEC-SCNC: 8.8 MG/DL (ref 8.6–10.5)
CHLORIDE SERPL-SCNC: 101 MMOL/L (ref 98–107)
CO2 SERPL-SCNC: 21.9 MMOL/L (ref 22–29)
CREAT SERPL-MCNC: 1.35 MG/DL (ref 0.76–1.27)
DEPRECATED RDW RBC AUTO: 48.1 FL (ref 37–54)
EGFRCR SERPLBLD CKD-EPI 2021: 56.5 ML/MIN/1.73
EOSINOPHIL # BLD AUTO: 0 10*3/MM3 (ref 0–0.4)
EOSINOPHIL NFR BLD AUTO: 0 % (ref 0.3–6.2)
ERYTHROCYTE [DISTWIDTH] IN BLOOD BY AUTOMATED COUNT: 13.4 % (ref 12.3–15.4)
GLOBULIN UR ELPH-MCNC: 3.2 GM/DL
GLUCOSE BLDC GLUCOMTR-MCNC: 195 MG/DL (ref 70–99)
GLUCOSE BLDC GLUCOMTR-MCNC: 199 MG/DL (ref 70–99)
GLUCOSE BLDC GLUCOMTR-MCNC: 222 MG/DL (ref 70–99)
GLUCOSE BLDC GLUCOMTR-MCNC: 232 MG/DL (ref 70–99)
GLUCOSE SERPL-MCNC: 199 MG/DL (ref 65–99)
HCT VFR BLD AUTO: 38.8 % (ref 37.5–51)
HGB BLD-MCNC: 13.3 G/DL (ref 13–17.7)
IMM GRANULOCYTES # BLD AUTO: 0.08 10*3/MM3 (ref 0–0.05)
IMM GRANULOCYTES NFR BLD AUTO: 1 % (ref 0–0.5)
LYMPHOCYTES # BLD AUTO: 0.75 10*3/MM3 (ref 0.7–3.1)
LYMPHOCYTES NFR BLD AUTO: 9.4 % (ref 19.6–45.3)
MAGNESIUM SERPL-MCNC: 2.9 MG/DL (ref 1.6–2.4)
MCH RBC QN AUTO: 33.3 PG (ref 26.6–33)
MCHC RBC AUTO-ENTMCNC: 34.3 G/DL (ref 31.5–35.7)
MCV RBC AUTO: 97.2 FL (ref 79–97)
MONOCYTES # BLD AUTO: 0.27 10*3/MM3 (ref 0.1–0.9)
MONOCYTES NFR BLD AUTO: 3.4 % (ref 5–12)
MRSA DNA SPEC QL NAA+PROBE: NORMAL
NEUTROPHILS NFR BLD AUTO: 6.88 10*3/MM3 (ref 1.7–7)
NEUTROPHILS NFR BLD AUTO: 85.9 % (ref 42.7–76)
NRBC BLD AUTO-RTO: 0.3 /100 WBC (ref 0–0.2)
PHOSPHATE SERPL-MCNC: 2 MG/DL (ref 2.5–4.5)
PLATELET # BLD AUTO: 129 10*3/MM3 (ref 140–450)
PMV BLD AUTO: 9.6 FL (ref 6–12)
POTASSIUM SERPL-SCNC: 4.5 MMOL/L (ref 3.5–5.2)
PROT SERPL-MCNC: 6.7 G/DL (ref 6–8.5)
RBC # BLD AUTO: 3.99 10*6/MM3 (ref 4.14–5.8)
RBC MORPH BLD: NORMAL
SMALL PLATELETS BLD QL SMEAR: NORMAL
SODIUM SERPL-SCNC: 134 MMOL/L (ref 136–145)
WBC MORPH BLD: NORMAL
WBC NRBC COR # BLD: 8 10*3/MM3 (ref 3.4–10.8)

## 2022-10-27 PROCEDURE — 94799 UNLISTED PULMONARY SVC/PX: CPT

## 2022-10-27 PROCEDURE — 93306 TTE W/DOPPLER COMPLETE: CPT

## 2022-10-27 PROCEDURE — 83735 ASSAY OF MAGNESIUM: CPT | Performed by: STUDENT IN AN ORGANIZED HEALTH CARE EDUCATION/TRAINING PROGRAM

## 2022-10-27 PROCEDURE — 82104 ALPHA-1-ANTITRYPSIN PHENO: CPT | Performed by: INTERNAL MEDICINE

## 2022-10-27 PROCEDURE — 71250 CT THORAX DX C-: CPT

## 2022-10-27 PROCEDURE — 87641 MR-STAPH DNA AMP PROBE: CPT | Performed by: INTERNAL MEDICINE

## 2022-10-27 PROCEDURE — 25010000002 ENOXAPARIN PER 10 MG: Performed by: HOSPITALIST

## 2022-10-27 PROCEDURE — 25010000002 METHYLPREDNISOLONE PER 125 MG: Performed by: STUDENT IN AN ORGANIZED HEALTH CARE EDUCATION/TRAINING PROGRAM

## 2022-10-27 PROCEDURE — 99223 1ST HOSP IP/OBS HIGH 75: CPT | Performed by: INTERNAL MEDICINE

## 2022-10-27 PROCEDURE — 84100 ASSAY OF PHOSPHORUS: CPT | Performed by: STUDENT IN AN ORGANIZED HEALTH CARE EDUCATION/TRAINING PROGRAM

## 2022-10-27 PROCEDURE — 80053 COMPREHEN METABOLIC PANEL: CPT | Performed by: STUDENT IN AN ORGANIZED HEALTH CARE EDUCATION/TRAINING PROGRAM

## 2022-10-27 PROCEDURE — 99233 SBSQ HOSP IP/OBS HIGH 50: CPT | Performed by: STUDENT IN AN ORGANIZED HEALTH CARE EDUCATION/TRAINING PROGRAM

## 2022-10-27 PROCEDURE — 94664 DEMO&/EVAL PT USE INHALER: CPT

## 2022-10-27 PROCEDURE — 82962 GLUCOSE BLOOD TEST: CPT

## 2022-10-27 PROCEDURE — 85007 BL SMEAR W/DIFF WBC COUNT: CPT | Performed by: STUDENT IN AN ORGANIZED HEALTH CARE EDUCATION/TRAINING PROGRAM

## 2022-10-27 PROCEDURE — 82103 ALPHA-1-ANTITRYPSIN TOTAL: CPT | Performed by: INTERNAL MEDICINE

## 2022-10-27 PROCEDURE — 25010000002 VANCOMYCIN 5 G RECONSTITUTED SOLUTION: Performed by: INTERNAL MEDICINE

## 2022-10-27 PROCEDURE — 25010000002 CEFEPIME PER 500 MG: Performed by: HOSPITALIST

## 2022-10-27 PROCEDURE — 85025 COMPLETE CBC W/AUTO DIFF WBC: CPT | Performed by: STUDENT IN AN ORGANIZED HEALTH CARE EDUCATION/TRAINING PROGRAM

## 2022-10-27 RX ORDER — FENTANYL/ROPIVACAINE/NS/PF 2-625MCG/1
15 PLASTIC BAG, INJECTION (ML) EPIDURAL ONCE
Status: COMPLETED | OUTPATIENT
Start: 2022-10-27 | End: 2022-10-27

## 2022-10-27 RX ADMIN — IPRATROPIUM BROMIDE AND ALBUTEROL SULFATE 3 ML: 2.5; .5 SOLUTION RESPIRATORY (INHALATION) at 07:43

## 2022-10-27 RX ADMIN — Medication 10 ML: at 09:34

## 2022-10-27 RX ADMIN — VANCOMYCIN HYDROCHLORIDE 1250 MG: 5 INJECTION, POWDER, LYOPHILIZED, FOR SOLUTION INTRAVENOUS at 06:38

## 2022-10-27 RX ADMIN — METHYLPREDNISOLONE SODIUM SUCCINATE 60 MG: 125 INJECTION, POWDER, FOR SOLUTION INTRAMUSCULAR; INTRAVENOUS at 04:14

## 2022-10-27 RX ADMIN — POTASSIUM PHOSPHATE, MONOBASIC AND POTASSIUM PHOSPHATE, DIBASIC 15 MMOL: 224; 236 INJECTION, SOLUTION, CONCENTRATE INTRAVENOUS at 11:52

## 2022-10-27 RX ADMIN — GUAIFENESIN 1200 MG: 600 TABLET ORAL at 20:32

## 2022-10-27 RX ADMIN — NICOTINE 1 PATCH: 21 PATCH, EXTENDED RELEASE TRANSDERMAL at 09:30

## 2022-10-27 RX ADMIN — METHYLPREDNISOLONE SODIUM SUCCINATE 60 MG: 125 INJECTION, POWDER, FOR SOLUTION INTRAMUSCULAR; INTRAVENOUS at 11:52

## 2022-10-27 RX ADMIN — METHYLPREDNISOLONE SODIUM SUCCINATE 60 MG: 125 INJECTION, POWDER, FOR SOLUTION INTRAMUSCULAR; INTRAVENOUS at 20:32

## 2022-10-27 RX ADMIN — CEFEPIME HYDROCHLORIDE 2 G: 2 INJECTION, POWDER, FOR SOLUTION INTRAMUSCULAR; INTRAVENOUS at 10:20

## 2022-10-27 RX ADMIN — IPRATROPIUM BROMIDE AND ALBUTEROL SULFATE 3 ML: 2.5; .5 SOLUTION RESPIRATORY (INHALATION) at 19:40

## 2022-10-27 RX ADMIN — ROSUVASTATIN CALCIUM 5 MG: 5 TABLET, FILM COATED ORAL at 09:32

## 2022-10-27 RX ADMIN — ARFORMOTEROL TARTRATE 15 MCG: 15 SOLUTION RESPIRATORY (INHALATION) at 07:43

## 2022-10-27 RX ADMIN — GUAIFENESIN 1200 MG: 600 TABLET ORAL at 09:31

## 2022-10-27 RX ADMIN — BUDESONIDE 0.5 MG: 0.5 INHALANT ORAL at 07:43

## 2022-10-27 RX ADMIN — Medication 10 ML: at 20:33

## 2022-10-27 RX ADMIN — PANTOPRAZOLE SODIUM 40 MG: 40 TABLET, DELAYED RELEASE ORAL at 09:31

## 2022-10-27 RX ADMIN — ARFORMOTEROL TARTRATE 15 MCG: 15 SOLUTION RESPIRATORY (INHALATION) at 19:40

## 2022-10-27 RX ADMIN — ALPRAZOLAM 0.25 MG: 0.25 TABLET ORAL at 20:32

## 2022-10-27 RX ADMIN — CEFEPIME HYDROCHLORIDE 2 G: 2 INJECTION, POWDER, FOR SOLUTION INTRAMUSCULAR; INTRAVENOUS at 20:32

## 2022-10-27 RX ADMIN — IPRATROPIUM BROMIDE AND ALBUTEROL SULFATE 3 ML: 2.5; .5 SOLUTION RESPIRATORY (INHALATION) at 00:19

## 2022-10-27 RX ADMIN — Medication 5 MG: at 23:24

## 2022-10-27 RX ADMIN — DOXYCYCLINE 100 MG: 100 CAPSULE ORAL at 09:32

## 2022-10-27 RX ADMIN — ENOXAPARIN SODIUM 40 MG: 100 INJECTION SUBCUTANEOUS at 09:48

## 2022-10-27 RX ADMIN — BUDESONIDE 0.5 MG: 0.5 INHALANT ORAL at 19:40

## 2022-10-27 RX ADMIN — IPRATROPIUM BROMIDE AND ALBUTEROL SULFATE 3 ML: 2.5; .5 SOLUTION RESPIRATORY (INHALATION) at 13:55

## 2022-10-27 RX ADMIN — METOPROLOL SUCCINATE 25 MG: 25 TABLET, EXTENDED RELEASE ORAL at 09:31

## 2022-10-28 LAB
ALBUMIN SERPL-MCNC: 3.9 G/DL (ref 3.5–5.2)
ALBUMIN/GLOB SERPL: 1.5 G/DL
ALP SERPL-CCNC: 52 U/L (ref 39–117)
ALT SERPL W P-5'-P-CCNC: 8 U/L (ref 1–41)
ANION GAP SERPL CALCULATED.3IONS-SCNC: 10.9 MMOL/L (ref 5–15)
AST SERPL-CCNC: 15 U/L (ref 1–40)
BACTERIA SPEC AEROBE CULT: ABNORMAL
BACTERIA SPEC RESP CULT: NORMAL
BASOPHILS # BLD AUTO: 0.01 10*3/MM3 (ref 0–0.2)
BASOPHILS NFR BLD AUTO: 0.1 % (ref 0–1.5)
BH CV ECHO MEAS - AO ROOT DIAM: 3.1 CM
BH CV ECHO MEAS - EDV(MOD-SP2): 78.3 ML
BH CV ECHO MEAS - EDV(MOD-SP4): 86.1 ML
BH CV ECHO MEAS - EF(MOD-BP): 65 %
BH CV ECHO MEAS - ESV(MOD-SP2): 27.3 ML
BH CV ECHO MEAS - ESV(MOD-SP4): 29.7 ML
BH CV ECHO MEAS - IVSD: 0.9 CM
BH CV ECHO MEAS - LA A2CS (ATRIAL LENGTH): 5.5 CM
BH CV ECHO MEAS - LA A4C LENGTH: 5.6 CM
BH CV ECHO MEAS - LA DIMENSION: 2.9 CM
BH CV ECHO MEAS - LAT PEAK E' VEL: 12.4 CM/SEC
BH CV ECHO MEAS - LVIDD: 5.1 CM
BH CV ECHO MEAS - LVIDS: 3.3 CM
BH CV ECHO MEAS - LVOT DIAM: 2 CM
BH CV ECHO MEAS - LVPWD: 0.9 CM
BH CV ECHO MEAS - MED PEAK E' VEL: 10.8 CM/SEC
BH CV ECHO MEAS - MV A MAX VEL: 79 CM/SEC
BH CV ECHO MEAS - MV DEC TIME: 123 MSEC
BH CV ECHO MEAS - MV E MAX VEL: 97.3 CM/SEC
BH CV ECHO MEAS - MV E/A: 1.2
BH CV ECHO MEAS - RAP SYSTOLE: 3 MMHG
BH CV ECHO MEAS - RVDD: 2.7 CM
BH CV ECHO MEAS - RVSP: 25 MMHG
BH CV ECHO MEAS - TR MAX PG: 22 MMHG
BH CV ECHO MEAS - TR MAX VEL: 232 CM/SEC
BH CV ECHO MEASUREMENTS AVERAGE E/E' RATIO: 8.39
BILIRUB SERPL-MCNC: 0.3 MG/DL (ref 0–1.2)
BUN SERPL-MCNC: 38 MG/DL (ref 8–23)
BUN/CREAT SERPL: 27.5 (ref 7–25)
CALCIUM SPEC-SCNC: 9.4 MG/DL (ref 8.6–10.5)
CHLORIDE SERPL-SCNC: 103 MMOL/L (ref 98–107)
CO2 SERPL-SCNC: 22.1 MMOL/L (ref 22–29)
CREAT SERPL-MCNC: 1.38 MG/DL (ref 0.76–1.27)
DEPRECATED RDW RBC AUTO: 48.3 FL (ref 37–54)
EGFRCR SERPLBLD CKD-EPI 2021: 55 ML/MIN/1.73
EOSINOPHIL # BLD AUTO: 0 10*3/MM3 (ref 0–0.4)
EOSINOPHIL NFR BLD AUTO: 0 % (ref 0.3–6.2)
ERYTHROCYTE [DISTWIDTH] IN BLOOD BY AUTOMATED COUNT: 13.5 % (ref 12.3–15.4)
GLOBULIN UR ELPH-MCNC: 2.6 GM/DL
GLUCOSE BLDC GLUCOMTR-MCNC: 195 MG/DL (ref 70–99)
GLUCOSE BLDC GLUCOMTR-MCNC: 213 MG/DL (ref 70–99)
GLUCOSE BLDC GLUCOMTR-MCNC: 230 MG/DL (ref 70–99)
GLUCOSE BLDC GLUCOMTR-MCNC: 247 MG/DL (ref 70–99)
GLUCOSE BLDC GLUCOMTR-MCNC: 282 MG/DL (ref 70–99)
GLUCOSE SERPL-MCNC: 200 MG/DL (ref 65–99)
GRAM STN SPEC: ABNORMAL
GRAM STN SPEC: NORMAL
HCT VFR BLD AUTO: 36.7 % (ref 37.5–51)
HGB BLD-MCNC: 12.8 G/DL (ref 13–17.7)
IMM GRANULOCYTES # BLD AUTO: 0.14 10*3/MM3 (ref 0–0.05)
IMM GRANULOCYTES NFR BLD AUTO: 1.6 % (ref 0–0.5)
ISOLATED FROM: ABNORMAL
IVRT: 46 MSEC
LEFT ATRIUM VOLUME INDEX: 16.9 ML/M2
LEFT ATRIUM VOLUME: 31.4 ML
LYMPHOCYTES # BLD AUTO: 1 10*3/MM3 (ref 0.7–3.1)
LYMPHOCYTES NFR BLD AUTO: 11.3 % (ref 19.6–45.3)
MAGNESIUM SERPL-MCNC: 2.6 MG/DL (ref 1.6–2.4)
MAXIMAL PREDICTED HEART RATE: 150 BPM
MCH RBC QN AUTO: 34.3 PG (ref 26.6–33)
MCHC RBC AUTO-ENTMCNC: 34.9 G/DL (ref 31.5–35.7)
MCV RBC AUTO: 98.4 FL (ref 79–97)
MONOCYTES # BLD AUTO: 0.3 10*3/MM3 (ref 0.1–0.9)
MONOCYTES NFR BLD AUTO: 3.4 % (ref 5–12)
NEUTROPHILS NFR BLD AUTO: 7.39 10*3/MM3 (ref 1.7–7)
NEUTROPHILS NFR BLD AUTO: 83.6 % (ref 42.7–76)
NRBC BLD AUTO-RTO: 0.2 /100 WBC (ref 0–0.2)
PHOSPHATE SERPL-MCNC: 3.1 MG/DL (ref 2.5–4.5)
PLATELET # BLD AUTO: 123 10*3/MM3 (ref 140–450)
PMV BLD AUTO: 9.2 FL (ref 6–12)
POTASSIUM SERPL-SCNC: 5.4 MMOL/L (ref 3.5–5.2)
PROT SERPL-MCNC: 6.5 G/DL (ref 6–8.5)
RBC # BLD AUTO: 3.73 10*6/MM3 (ref 4.14–5.8)
SODIUM SERPL-SCNC: 136 MMOL/L (ref 136–145)
STRESS TARGET HR: 128 BPM
VANCOMYCIN SERPL-MCNC: 8.86 MCG/ML (ref 5–40)
WBC NRBC COR # BLD: 8.84 10*3/MM3 (ref 3.4–10.8)

## 2022-10-28 PROCEDURE — 25010000002 CALCIUM GLUCONATE-NACL 1-0.675 GM/50ML-% SOLUTION: Performed by: STUDENT IN AN ORGANIZED HEALTH CARE EDUCATION/TRAINING PROGRAM

## 2022-10-28 PROCEDURE — 94799 UNLISTED PULMONARY SVC/PX: CPT

## 2022-10-28 PROCEDURE — 80053 COMPREHEN METABOLIC PANEL: CPT | Performed by: STUDENT IN AN ORGANIZED HEALTH CARE EDUCATION/TRAINING PROGRAM

## 2022-10-28 PROCEDURE — 99233 SBSQ HOSP IP/OBS HIGH 50: CPT | Performed by: STUDENT IN AN ORGANIZED HEALTH CARE EDUCATION/TRAINING PROGRAM

## 2022-10-28 PROCEDURE — 99233 SBSQ HOSP IP/OBS HIGH 50: CPT | Performed by: INTERNAL MEDICINE

## 2022-10-28 PROCEDURE — 82962 GLUCOSE BLOOD TEST: CPT

## 2022-10-28 PROCEDURE — 25010000002 ENOXAPARIN PER 10 MG: Performed by: HOSPITALIST

## 2022-10-28 PROCEDURE — 85025 COMPLETE CBC W/AUTO DIFF WBC: CPT | Performed by: STUDENT IN AN ORGANIZED HEALTH CARE EDUCATION/TRAINING PROGRAM

## 2022-10-28 PROCEDURE — 63710000001 INSULIN REGULAR HUMAN PER 5 UNITS: Performed by: STUDENT IN AN ORGANIZED HEALTH CARE EDUCATION/TRAINING PROGRAM

## 2022-10-28 PROCEDURE — 94761 N-INVAS EAR/PLS OXIMETRY MLT: CPT

## 2022-10-28 PROCEDURE — 25010000002 CEFEPIME PER 500 MG: Performed by: HOSPITALIST

## 2022-10-28 PROCEDURE — 97110 THERAPEUTIC EXERCISES: CPT

## 2022-10-28 PROCEDURE — 84100 ASSAY OF PHOSPHORUS: CPT | Performed by: STUDENT IN AN ORGANIZED HEALTH CARE EDUCATION/TRAINING PROGRAM

## 2022-10-28 PROCEDURE — 97116 GAIT TRAINING THERAPY: CPT

## 2022-10-28 PROCEDURE — 25010000002 CEFTRIAXONE PER 250 MG: Performed by: STUDENT IN AN ORGANIZED HEALTH CARE EDUCATION/TRAINING PROGRAM

## 2022-10-28 PROCEDURE — 25010000002 METHYLPREDNISOLONE PER 125 MG: Performed by: STUDENT IN AN ORGANIZED HEALTH CARE EDUCATION/TRAINING PROGRAM

## 2022-10-28 PROCEDURE — 80202 ASSAY OF VANCOMYCIN: CPT | Performed by: INTERNAL MEDICINE

## 2022-10-28 PROCEDURE — 83735 ASSAY OF MAGNESIUM: CPT | Performed by: STUDENT IN AN ORGANIZED HEALTH CARE EDUCATION/TRAINING PROGRAM

## 2022-10-28 RX ORDER — DEXTROSE MONOHYDRATE 25 G/50ML
50 INJECTION, SOLUTION INTRAVENOUS ONCE
Status: COMPLETED | OUTPATIENT
Start: 2022-10-28 | End: 2022-10-28

## 2022-10-28 RX ORDER — CALCIUM GLUCONATE 20 MG/ML
1 INJECTION, SOLUTION INTRAVENOUS ONCE
Status: COMPLETED | OUTPATIENT
Start: 2022-10-28 | End: 2022-10-28

## 2022-10-28 RX ORDER — METHYLPREDNISOLONE SODIUM SUCCINATE 40 MG/ML
40 INJECTION, POWDER, LYOPHILIZED, FOR SOLUTION INTRAMUSCULAR; INTRAVENOUS EVERY 12 HOURS
Status: DISCONTINUED | OUTPATIENT
Start: 2022-10-28 | End: 2022-10-28

## 2022-10-28 RX ORDER — CEFTRIAXONE SODIUM 1 G/50ML
1 INJECTION, SOLUTION INTRAVENOUS EVERY 24 HOURS
Status: COMPLETED | OUTPATIENT
Start: 2022-10-28 | End: 2022-10-29

## 2022-10-28 RX ORDER — PREDNISONE 20 MG/1
40 TABLET ORAL
Status: DISCONTINUED | OUTPATIENT
Start: 2022-10-29 | End: 2022-10-31 | Stop reason: HOSPADM

## 2022-10-28 RX ADMIN — METOPROLOL SUCCINATE 25 MG: 25 TABLET, EXTENDED RELEASE ORAL at 09:09

## 2022-10-28 RX ADMIN — ENOXAPARIN SODIUM 40 MG: 100 INJECTION SUBCUTANEOUS at 09:09

## 2022-10-28 RX ADMIN — CEFEPIME HYDROCHLORIDE 2 G: 2 INJECTION, POWDER, FOR SOLUTION INTRAMUSCULAR; INTRAVENOUS at 08:19

## 2022-10-28 RX ADMIN — Medication 10 ML: at 09:10

## 2022-10-28 RX ADMIN — ARFORMOTEROL TARTRATE 15 MCG: 15 SOLUTION RESPIRATORY (INHALATION) at 07:17

## 2022-10-28 RX ADMIN — DEXTROSE MONOHYDRATE 50 ML: 25 INJECTION, SOLUTION INTRAVENOUS at 09:06

## 2022-10-28 RX ADMIN — BUDESONIDE 0.5 MG: 0.5 INHALANT ORAL at 19:15

## 2022-10-28 RX ADMIN — IPRATROPIUM BROMIDE AND ALBUTEROL SULFATE 3 ML: 2.5; .5 SOLUTION RESPIRATORY (INHALATION) at 19:15

## 2022-10-28 RX ADMIN — IPRATROPIUM BROMIDE AND ALBUTEROL SULFATE 3 ML: 2.5; .5 SOLUTION RESPIRATORY (INHALATION) at 12:07

## 2022-10-28 RX ADMIN — INSULIN HUMAN 10 UNITS: 100 INJECTION, SOLUTION PARENTERAL at 09:06

## 2022-10-28 RX ADMIN — SENNOSIDES AND DOCUSATE SODIUM 2 TABLET: 8.6; 5 TABLET ORAL at 09:09

## 2022-10-28 RX ADMIN — GUAIFENESIN 1200 MG: 600 TABLET ORAL at 09:09

## 2022-10-28 RX ADMIN — SODIUM BICARBONATE 50 MEQ: 84 INJECTION INTRAVENOUS at 09:55

## 2022-10-28 RX ADMIN — GUAIFENESIN 1200 MG: 600 TABLET ORAL at 21:47

## 2022-10-28 RX ADMIN — IPRATROPIUM BROMIDE AND ALBUTEROL SULFATE 3 ML: 2.5; .5 SOLUTION RESPIRATORY (INHALATION) at 07:17

## 2022-10-28 RX ADMIN — ALPRAZOLAM 0.25 MG: 0.25 TABLET ORAL at 21:52

## 2022-10-28 RX ADMIN — PANTOPRAZOLE SODIUM 40 MG: 40 TABLET, DELAYED RELEASE ORAL at 09:09

## 2022-10-28 RX ADMIN — Medication 10 ML: at 21:53

## 2022-10-28 RX ADMIN — CEFTRIAXONE SODIUM 1 G: 1 INJECTION, SOLUTION INTRAVENOUS at 15:06

## 2022-10-28 RX ADMIN — ROSUVASTATIN CALCIUM 5 MG: 5 TABLET, FILM COATED ORAL at 09:09

## 2022-10-28 RX ADMIN — CALCIUM GLUCONATE 1 G: 20 INJECTION, SOLUTION INTRAVENOUS at 09:10

## 2022-10-28 RX ADMIN — METHYLPREDNISOLONE SODIUM SUCCINATE 60 MG: 125 INJECTION, POWDER, FOR SOLUTION INTRAMUSCULAR; INTRAVENOUS at 03:50

## 2022-10-28 RX ADMIN — BUDESONIDE 0.5 MG: 0.5 INHALANT ORAL at 07:17

## 2022-10-28 RX ADMIN — ARFORMOTEROL TARTRATE 15 MCG: 15 SOLUTION RESPIRATORY (INHALATION) at 19:15

## 2022-10-28 RX ADMIN — NICOTINE 1 PATCH: 21 PATCH, EXTENDED RELEASE TRANSDERMAL at 09:09

## 2022-10-29 LAB
ALBUMIN SERPL-MCNC: 3.6 G/DL (ref 3.5–5.2)
ALBUMIN/GLOB SERPL: 1.3 G/DL
ALP SERPL-CCNC: 51 U/L (ref 39–117)
ALT SERPL W P-5'-P-CCNC: 9 U/L (ref 1–41)
ANION GAP SERPL CALCULATED.3IONS-SCNC: 10.3 MMOL/L (ref 5–15)
AST SERPL-CCNC: 14 U/L (ref 1–40)
BASOPHILS # BLD AUTO: 0.03 10*3/MM3 (ref 0–0.2)
BASOPHILS NFR BLD AUTO: 0.3 % (ref 0–1.5)
BILIRUB SERPL-MCNC: 0.3 MG/DL (ref 0–1.2)
BUN SERPL-MCNC: 40 MG/DL (ref 8–23)
BUN/CREAT SERPL: 32 (ref 7–25)
CALCIUM SPEC-SCNC: 9.6 MG/DL (ref 8.6–10.5)
CHLORIDE SERPL-SCNC: 101 MMOL/L (ref 98–107)
CO2 SERPL-SCNC: 26.7 MMOL/L (ref 22–29)
CREAT SERPL-MCNC: 1.25 MG/DL (ref 0.76–1.27)
DEPRECATED RDW RBC AUTO: 47.8 FL (ref 37–54)
EGFRCR SERPLBLD CKD-EPI 2021: 61.9 ML/MIN/1.73
EOSINOPHIL # BLD AUTO: 0 10*3/MM3 (ref 0–0.4)
EOSINOPHIL NFR BLD AUTO: 0 % (ref 0.3–6.2)
ERYTHROCYTE [DISTWIDTH] IN BLOOD BY AUTOMATED COUNT: 13.1 % (ref 12.3–15.4)
GLOBULIN UR ELPH-MCNC: 2.7 GM/DL
GLUCOSE BLDC GLUCOMTR-MCNC: 164 MG/DL (ref 70–99)
GLUCOSE BLDC GLUCOMTR-MCNC: 180 MG/DL (ref 70–99)
GLUCOSE BLDC GLUCOMTR-MCNC: 248 MG/DL (ref 70–99)
GLUCOSE BLDC GLUCOMTR-MCNC: 291 MG/DL (ref 70–99)
GLUCOSE SERPL-MCNC: 164 MG/DL (ref 65–99)
HCT VFR BLD AUTO: 35.7 % (ref 37.5–51)
HGB BLD-MCNC: 12.5 G/DL (ref 13–17.7)
IMM GRANULOCYTES # BLD AUTO: 0.22 10*3/MM3 (ref 0–0.05)
IMM GRANULOCYTES NFR BLD AUTO: 2.2 % (ref 0–0.5)
LYMPHOCYTES # BLD AUTO: 1.42 10*3/MM3 (ref 0.7–3.1)
LYMPHOCYTES NFR BLD AUTO: 14.3 % (ref 19.6–45.3)
MCH RBC QN AUTO: 34.8 PG (ref 26.6–33)
MCHC RBC AUTO-ENTMCNC: 35 G/DL (ref 31.5–35.7)
MCV RBC AUTO: 99.4 FL (ref 79–97)
MONOCYTES # BLD AUTO: 0.62 10*3/MM3 (ref 0.1–0.9)
MONOCYTES NFR BLD AUTO: 6.2 % (ref 5–12)
NEUTROPHILS NFR BLD AUTO: 7.65 10*3/MM3 (ref 1.7–7)
NEUTROPHILS NFR BLD AUTO: 77 % (ref 42.7–76)
NRBC BLD AUTO-RTO: 0.2 /100 WBC (ref 0–0.2)
PLATELET # BLD AUTO: 117 10*3/MM3 (ref 140–450)
PMV BLD AUTO: 9.2 FL (ref 6–12)
POTASSIUM SERPL-SCNC: 5.1 MMOL/L (ref 3.5–5.2)
PROT SERPL-MCNC: 6.3 G/DL (ref 6–8.5)
RBC # BLD AUTO: 3.59 10*6/MM3 (ref 4.14–5.8)
SODIUM SERPL-SCNC: 138 MMOL/L (ref 136–145)
WBC NRBC COR # BLD: 9.94 10*3/MM3 (ref 3.4–10.8)

## 2022-10-29 PROCEDURE — 25010000002 ENOXAPARIN PER 10 MG: Performed by: HOSPITALIST

## 2022-10-29 PROCEDURE — 94799 UNLISTED PULMONARY SVC/PX: CPT

## 2022-10-29 PROCEDURE — 94664 DEMO&/EVAL PT USE INHALER: CPT

## 2022-10-29 PROCEDURE — 82962 GLUCOSE BLOOD TEST: CPT

## 2022-10-29 PROCEDURE — 99233 SBSQ HOSP IP/OBS HIGH 50: CPT | Performed by: STUDENT IN AN ORGANIZED HEALTH CARE EDUCATION/TRAINING PROGRAM

## 2022-10-29 PROCEDURE — 80053 COMPREHEN METABOLIC PANEL: CPT | Performed by: STUDENT IN AN ORGANIZED HEALTH CARE EDUCATION/TRAINING PROGRAM

## 2022-10-29 PROCEDURE — 25010000002 CEFTRIAXONE PER 250 MG: Performed by: STUDENT IN AN ORGANIZED HEALTH CARE EDUCATION/TRAINING PROGRAM

## 2022-10-29 PROCEDURE — 99232 SBSQ HOSP IP/OBS MODERATE 35: CPT | Performed by: STUDENT IN AN ORGANIZED HEALTH CARE EDUCATION/TRAINING PROGRAM

## 2022-10-29 PROCEDURE — 94761 N-INVAS EAR/PLS OXIMETRY MLT: CPT

## 2022-10-29 PROCEDURE — 85025 COMPLETE CBC W/AUTO DIFF WBC: CPT | Performed by: STUDENT IN AN ORGANIZED HEALTH CARE EDUCATION/TRAINING PROGRAM

## 2022-10-29 PROCEDURE — 63710000001 PREDNISONE PER 1 MG: Performed by: INTERNAL MEDICINE

## 2022-10-29 RX ADMIN — CEFTRIAXONE SODIUM 1 G: 1 INJECTION, SOLUTION INTRAVENOUS at 16:31

## 2022-10-29 RX ADMIN — Medication 10 ML: at 21:15

## 2022-10-29 RX ADMIN — BUDESONIDE 0.5 MG: 0.5 INHALANT ORAL at 06:50

## 2022-10-29 RX ADMIN — ENOXAPARIN SODIUM 40 MG: 100 INJECTION SUBCUTANEOUS at 08:48

## 2022-10-29 RX ADMIN — ARFORMOTEROL TARTRATE 15 MCG: 15 SOLUTION RESPIRATORY (INHALATION) at 19:11

## 2022-10-29 RX ADMIN — ROSUVASTATIN CALCIUM 5 MG: 5 TABLET, FILM COATED ORAL at 08:48

## 2022-10-29 RX ADMIN — GUAIFENESIN 1200 MG: 600 TABLET ORAL at 08:48

## 2022-10-29 RX ADMIN — Medication 10 ML: at 08:48

## 2022-10-29 RX ADMIN — PANTOPRAZOLE SODIUM 40 MG: 40 TABLET, DELAYED RELEASE ORAL at 08:48

## 2022-10-29 RX ADMIN — IPRATROPIUM BROMIDE AND ALBUTEROL SULFATE 3 ML: 2.5; .5 SOLUTION RESPIRATORY (INHALATION) at 06:50

## 2022-10-29 RX ADMIN — BUDESONIDE 0.5 MG: 0.5 INHALANT ORAL at 19:11

## 2022-10-29 RX ADMIN — IPRATROPIUM BROMIDE AND ALBUTEROL SULFATE 3 ML: 2.5; .5 SOLUTION RESPIRATORY (INHALATION) at 13:03

## 2022-10-29 RX ADMIN — IPRATROPIUM BROMIDE AND ALBUTEROL SULFATE 3 ML: 2.5; .5 SOLUTION RESPIRATORY (INHALATION) at 00:29

## 2022-10-29 RX ADMIN — NICOTINE 1 PATCH: 21 PATCH, EXTENDED RELEASE TRANSDERMAL at 08:49

## 2022-10-29 RX ADMIN — IPRATROPIUM BROMIDE AND ALBUTEROL SULFATE 3 ML: 2.5; .5 SOLUTION RESPIRATORY (INHALATION) at 19:11

## 2022-10-29 RX ADMIN — GUAIFENESIN 1200 MG: 600 TABLET ORAL at 21:15

## 2022-10-29 RX ADMIN — ARFORMOTEROL TARTRATE 15 MCG: 15 SOLUTION RESPIRATORY (INHALATION) at 06:50

## 2022-10-29 RX ADMIN — PREDNISONE 40 MG: 20 TABLET ORAL at 08:48

## 2022-10-29 RX ADMIN — METOPROLOL SUCCINATE 25 MG: 25 TABLET, EXTENDED RELEASE ORAL at 08:48

## 2022-10-30 LAB
ALBUMIN SERPL-MCNC: 3.7 G/DL (ref 3.5–5.2)
ALBUMIN/GLOB SERPL: 1.4 G/DL
ALP SERPL-CCNC: 52 U/L (ref 39–117)
ALT SERPL W P-5'-P-CCNC: 10 U/L (ref 1–41)
ANION GAP SERPL CALCULATED.3IONS-SCNC: 6.6 MMOL/L (ref 5–15)
AST SERPL-CCNC: 12 U/L (ref 1–40)
BACTERIA SPEC AEROBE CULT: NORMAL
BASOPHILS # BLD AUTO: 0.04 10*3/MM3 (ref 0–0.2)
BASOPHILS NFR BLD AUTO: 0.5 % (ref 0–1.5)
BILIRUB SERPL-MCNC: 0.3 MG/DL (ref 0–1.2)
BUN SERPL-MCNC: 42 MG/DL (ref 8–23)
BUN/CREAT SERPL: 29.4 (ref 7–25)
CALCIUM SPEC-SCNC: 9.8 MG/DL (ref 8.6–10.5)
CHLORIDE SERPL-SCNC: 98 MMOL/L (ref 98–107)
CO2 SERPL-SCNC: 29.4 MMOL/L (ref 22–29)
CREAT SERPL-MCNC: 1.43 MG/DL (ref 0.76–1.27)
DEPRECATED RDW RBC AUTO: 46 FL (ref 37–54)
EGFRCR SERPLBLD CKD-EPI 2021: 52.7 ML/MIN/1.73
EOSINOPHIL # BLD AUTO: 0 10*3/MM3 (ref 0–0.4)
EOSINOPHIL NFR BLD AUTO: 0 % (ref 0.3–6.2)
ERYTHROCYTE [DISTWIDTH] IN BLOOD BY AUTOMATED COUNT: 12.8 % (ref 12.3–15.4)
GLOBULIN UR ELPH-MCNC: 2.6 GM/DL
GLUCOSE BLDC GLUCOMTR-MCNC: 151 MG/DL (ref 70–99)
GLUCOSE BLDC GLUCOMTR-MCNC: 157 MG/DL (ref 70–99)
GLUCOSE BLDC GLUCOMTR-MCNC: 197 MG/DL (ref 70–99)
GLUCOSE BLDC GLUCOMTR-MCNC: 297 MG/DL (ref 70–99)
GLUCOSE BLDC GLUCOMTR-MCNC: 319 MG/DL (ref 70–99)
GLUCOSE SERPL-MCNC: 188 MG/DL (ref 65–99)
HCT VFR BLD AUTO: 36.7 % (ref 37.5–51)
HGB BLD-MCNC: 12.7 G/DL (ref 13–17.7)
IMM GRANULOCYTES # BLD AUTO: 0.51 10*3/MM3 (ref 0–0.05)
IMM GRANULOCYTES NFR BLD AUTO: 6 % (ref 0–0.5)
LYMPHOCYTES # BLD AUTO: 1.29 10*3/MM3 (ref 0.7–3.1)
LYMPHOCYTES NFR BLD AUTO: 15.2 % (ref 19.6–45.3)
MAGNESIUM SERPL-MCNC: 2.4 MG/DL (ref 1.6–2.4)
MCH RBC QN AUTO: 33.9 PG (ref 26.6–33)
MCHC RBC AUTO-ENTMCNC: 34.6 G/DL (ref 31.5–35.7)
MCV RBC AUTO: 97.9 FL (ref 79–97)
MONOCYTES # BLD AUTO: 0.56 10*3/MM3 (ref 0.1–0.9)
MONOCYTES NFR BLD AUTO: 6.6 % (ref 5–12)
NEUTROPHILS NFR BLD AUTO: 6.08 10*3/MM3 (ref 1.7–7)
NEUTROPHILS NFR BLD AUTO: 71.7 % (ref 42.7–76)
NRBC BLD AUTO-RTO: 0.2 /100 WBC (ref 0–0.2)
PHOSPHATE SERPL-MCNC: 3.3 MG/DL (ref 2.5–4.5)
PLAT MORPH BLD: NORMAL
PLATELET # BLD AUTO: 113 10*3/MM3 (ref 140–450)
PMV BLD AUTO: 9.1 FL (ref 6–12)
POTASSIUM SERPL-SCNC: 5.6 MMOL/L (ref 3.5–5.2)
PROT SERPL-MCNC: 6.3 G/DL (ref 6–8.5)
RBC # BLD AUTO: 3.75 10*6/MM3 (ref 4.14–5.8)
RBC MORPH BLD: NORMAL
SODIUM SERPL-SCNC: 134 MMOL/L (ref 136–145)
WBC MORPH BLD: NORMAL
WBC NRBC COR # BLD: 8.48 10*3/MM3 (ref 3.4–10.8)

## 2022-10-30 PROCEDURE — 97116 GAIT TRAINING THERAPY: CPT

## 2022-10-30 PROCEDURE — 82962 GLUCOSE BLOOD TEST: CPT

## 2022-10-30 PROCEDURE — 63710000001 INSULIN REGULAR HUMAN PER 5 UNITS: Performed by: STUDENT IN AN ORGANIZED HEALTH CARE EDUCATION/TRAINING PROGRAM

## 2022-10-30 PROCEDURE — 84100 ASSAY OF PHOSPHORUS: CPT | Performed by: STUDENT IN AN ORGANIZED HEALTH CARE EDUCATION/TRAINING PROGRAM

## 2022-10-30 PROCEDURE — 94761 N-INVAS EAR/PLS OXIMETRY MLT: CPT

## 2022-10-30 PROCEDURE — 85025 COMPLETE CBC W/AUTO DIFF WBC: CPT | Performed by: STUDENT IN AN ORGANIZED HEALTH CARE EDUCATION/TRAINING PROGRAM

## 2022-10-30 PROCEDURE — 94799 UNLISTED PULMONARY SVC/PX: CPT

## 2022-10-30 PROCEDURE — 25010000002 CEFTRIAXONE PER 250 MG: Performed by: STUDENT IN AN ORGANIZED HEALTH CARE EDUCATION/TRAINING PROGRAM

## 2022-10-30 PROCEDURE — 83735 ASSAY OF MAGNESIUM: CPT | Performed by: STUDENT IN AN ORGANIZED HEALTH CARE EDUCATION/TRAINING PROGRAM

## 2022-10-30 PROCEDURE — 25010000002 CALCIUM GLUCONATE-NACL 1-0.675 GM/50ML-% SOLUTION: Performed by: STUDENT IN AN ORGANIZED HEALTH CARE EDUCATION/TRAINING PROGRAM

## 2022-10-30 PROCEDURE — 80053 COMPREHEN METABOLIC PANEL: CPT | Performed by: STUDENT IN AN ORGANIZED HEALTH CARE EDUCATION/TRAINING PROGRAM

## 2022-10-30 PROCEDURE — 85007 BL SMEAR W/DIFF WBC COUNT: CPT | Performed by: STUDENT IN AN ORGANIZED HEALTH CARE EDUCATION/TRAINING PROGRAM

## 2022-10-30 PROCEDURE — 97530 THERAPEUTIC ACTIVITIES: CPT

## 2022-10-30 PROCEDURE — 99233 SBSQ HOSP IP/OBS HIGH 50: CPT | Performed by: STUDENT IN AN ORGANIZED HEALTH CARE EDUCATION/TRAINING PROGRAM

## 2022-10-30 PROCEDURE — 94664 DEMO&/EVAL PT USE INHALER: CPT

## 2022-10-30 PROCEDURE — 99232 SBSQ HOSP IP/OBS MODERATE 35: CPT | Performed by: STUDENT IN AN ORGANIZED HEALTH CARE EDUCATION/TRAINING PROGRAM

## 2022-10-30 PROCEDURE — 25010000002 ENOXAPARIN PER 10 MG: Performed by: HOSPITALIST

## 2022-10-30 PROCEDURE — 63710000001 PREDNISONE PER 1 MG: Performed by: INTERNAL MEDICINE

## 2022-10-30 RX ORDER — CALCIUM GLUCONATE 20 MG/ML
1 INJECTION, SOLUTION INTRAVENOUS ONCE
Status: COMPLETED | OUTPATIENT
Start: 2022-10-30 | End: 2022-10-30

## 2022-10-30 RX ORDER — DEXTROSE MONOHYDRATE 25 G/50ML
50 INJECTION, SOLUTION INTRAVENOUS ONCE
Status: DISCONTINUED | OUTPATIENT
Start: 2022-10-30 | End: 2022-10-31 | Stop reason: HOSPADM

## 2022-10-30 RX ORDER — CEFTRIAXONE SODIUM 1 G/50ML
1 INJECTION, SOLUTION INTRAVENOUS EVERY 24 HOURS
Status: DISCONTINUED | OUTPATIENT
Start: 2022-10-30 | End: 2022-10-31 | Stop reason: HOSPADM

## 2022-10-30 RX ADMIN — IPRATROPIUM BROMIDE AND ALBUTEROL SULFATE 3 ML: 2.5; .5 SOLUTION RESPIRATORY (INHALATION) at 11:25

## 2022-10-30 RX ADMIN — Medication 10 ML: at 21:12

## 2022-10-30 RX ADMIN — PREDNISONE 40 MG: 20 TABLET ORAL at 08:21

## 2022-10-30 RX ADMIN — METOPROLOL SUCCINATE 25 MG: 25 TABLET, EXTENDED RELEASE ORAL at 08:20

## 2022-10-30 RX ADMIN — CALCIUM GLUCONATE 1 G: 20 INJECTION, SOLUTION INTRAVENOUS at 08:19

## 2022-10-30 RX ADMIN — IPRATROPIUM BROMIDE AND ALBUTEROL SULFATE 3 ML: 2.5; .5 SOLUTION RESPIRATORY (INHALATION) at 07:06

## 2022-10-30 RX ADMIN — SODIUM BICARBONATE 50 MEQ: 84 INJECTION INTRAVENOUS at 08:20

## 2022-10-30 RX ADMIN — IPRATROPIUM BROMIDE AND ALBUTEROL SULFATE 3 ML: 2.5; .5 SOLUTION RESPIRATORY (INHALATION) at 00:50

## 2022-10-30 RX ADMIN — NICOTINE 1 PATCH: 21 PATCH, EXTENDED RELEASE TRANSDERMAL at 09:22

## 2022-10-30 RX ADMIN — BUDESONIDE 0.5 MG: 0.5 INHALANT ORAL at 19:00

## 2022-10-30 RX ADMIN — PANTOPRAZOLE SODIUM 40 MG: 40 TABLET, DELAYED RELEASE ORAL at 08:21

## 2022-10-30 RX ADMIN — ARFORMOTEROL TARTRATE 15 MCG: 15 SOLUTION RESPIRATORY (INHALATION) at 07:07

## 2022-10-30 RX ADMIN — GUAIFENESIN 1200 MG: 600 TABLET ORAL at 21:11

## 2022-10-30 RX ADMIN — ARFORMOTEROL TARTRATE 15 MCG: 15 SOLUTION RESPIRATORY (INHALATION) at 19:00

## 2022-10-30 RX ADMIN — ENOXAPARIN SODIUM 40 MG: 100 INJECTION SUBCUTANEOUS at 08:20

## 2022-10-30 RX ADMIN — Medication 10 ML: at 08:42

## 2022-10-30 RX ADMIN — GUAIFENESIN 1200 MG: 600 TABLET ORAL at 08:21

## 2022-10-30 RX ADMIN — IPRATROPIUM BROMIDE AND ALBUTEROL SULFATE 3 ML: 2.5; .5 SOLUTION RESPIRATORY (INHALATION) at 19:00

## 2022-10-30 RX ADMIN — BUDESONIDE 0.5 MG: 0.5 INHALANT ORAL at 07:07

## 2022-10-30 RX ADMIN — ROSUVASTATIN CALCIUM 5 MG: 5 TABLET, FILM COATED ORAL at 08:21

## 2022-10-30 RX ADMIN — CEFTRIAXONE SODIUM 1 G: 1 INJECTION, SOLUTION INTRAVENOUS at 15:35

## 2022-10-30 RX ADMIN — INSULIN HUMAN 10 UNITS: 100 INJECTION, SOLUTION PARENTERAL at 08:20

## 2022-10-31 ENCOUNTER — READMISSION MANAGEMENT (OUTPATIENT)
Dept: CALL CENTER | Facility: HOSPITAL | Age: 72
End: 2022-10-31

## 2022-10-31 VITALS
WEIGHT: 149.91 LBS | DIASTOLIC BLOOD PRESSURE: 70 MMHG | BODY MASS INDEX: 20.99 KG/M2 | OXYGEN SATURATION: 96 % | SYSTOLIC BLOOD PRESSURE: 118 MMHG | HEART RATE: 91 BPM | RESPIRATION RATE: 18 BRPM | TEMPERATURE: 97.6 F | HEIGHT: 71 IN

## 2022-10-31 LAB
ALBUMIN SERPL-MCNC: 4 G/DL (ref 3.5–5.2)
ALBUMIN/GLOB SERPL: 1.7 G/DL
ALP SERPL-CCNC: 51 U/L (ref 39–117)
ALT SERPL W P-5'-P-CCNC: 13 U/L (ref 1–41)
ANION GAP SERPL CALCULATED.3IONS-SCNC: 10.4 MMOL/L (ref 5–15)
AST SERPL-CCNC: 15 U/L (ref 1–40)
BASOPHILS # BLD AUTO: 0.09 10*3/MM3 (ref 0–0.2)
BASOPHILS NFR BLD AUTO: 1.1 % (ref 0–1.5)
BILIRUB SERPL-MCNC: 0.3 MG/DL (ref 0–1.2)
BUN SERPL-MCNC: 41 MG/DL (ref 8–23)
BUN/CREAT SERPL: 29.5 (ref 7–25)
CALCIUM SPEC-SCNC: 9.9 MG/DL (ref 8.6–10.5)
CHLORIDE SERPL-SCNC: 97 MMOL/L (ref 98–107)
CO2 SERPL-SCNC: 30.6 MMOL/L (ref 22–29)
CREAT SERPL-MCNC: 1.39 MG/DL (ref 0.76–1.27)
DEPRECATED RDW RBC AUTO: 45.4 FL (ref 37–54)
EGFRCR SERPLBLD CKD-EPI 2021: 54.5 ML/MIN/1.73
EOSINOPHIL # BLD AUTO: 0.02 10*3/MM3 (ref 0–0.4)
EOSINOPHIL NFR BLD AUTO: 0.2 % (ref 0.3–6.2)
ERYTHROCYTE [DISTWIDTH] IN BLOOD BY AUTOMATED COUNT: 12.8 % (ref 12.3–15.4)
GLOBULIN UR ELPH-MCNC: 2.3 GM/DL
GLUCOSE BLDC GLUCOMTR-MCNC: 151 MG/DL (ref 70–99)
GLUCOSE SERPL-MCNC: 147 MG/DL (ref 65–99)
HCT VFR BLD AUTO: 38.1 % (ref 37.5–51)
HGB BLD-MCNC: 13.2 G/DL (ref 13–17.7)
IMM GRANULOCYTES # BLD AUTO: 0.86 10*3/MM3 (ref 0–0.05)
IMM GRANULOCYTES NFR BLD AUTO: 10.2 % (ref 0–0.5)
LYMPHOCYTES # BLD AUTO: 1.63 10*3/MM3 (ref 0.7–3.1)
LYMPHOCYTES NFR BLD AUTO: 19.3 % (ref 19.6–45.3)
MAGNESIUM SERPL-MCNC: 2.3 MG/DL (ref 1.6–2.4)
MCH RBC QN AUTO: 33.8 PG (ref 26.6–33)
MCHC RBC AUTO-ENTMCNC: 34.6 G/DL (ref 31.5–35.7)
MCV RBC AUTO: 97.7 FL (ref 79–97)
MONOCYTES # BLD AUTO: 0.61 10*3/MM3 (ref 0.1–0.9)
MONOCYTES NFR BLD AUTO: 7.2 % (ref 5–12)
NEUTROPHILS NFR BLD AUTO: 5.23 10*3/MM3 (ref 1.7–7)
NEUTROPHILS NFR BLD AUTO: 62 % (ref 42.7–76)
NRBC BLD AUTO-RTO: 0.2 /100 WBC (ref 0–0.2)
PHOSPHATE SERPL-MCNC: 3.6 MG/DL (ref 2.5–4.5)
PLATELET # BLD AUTO: 127 10*3/MM3 (ref 140–450)
PMV BLD AUTO: 9.4 FL (ref 6–12)
POTASSIUM SERPL-SCNC: 4.8 MMOL/L (ref 3.5–5.2)
PROT SERPL-MCNC: 6.3 G/DL (ref 6–8.5)
RBC # BLD AUTO: 3.9 10*6/MM3 (ref 4.14–5.8)
RBC MORPH BLD: NORMAL
SMALL PLATELETS BLD QL SMEAR: ADEQUATE
SODIUM SERPL-SCNC: 138 MMOL/L (ref 136–145)
WBC MORPH BLD: NORMAL
WBC NRBC COR # BLD: 8.44 10*3/MM3 (ref 3.4–10.8)

## 2022-10-31 PROCEDURE — 94761 N-INVAS EAR/PLS OXIMETRY MLT: CPT

## 2022-10-31 PROCEDURE — 82962 GLUCOSE BLOOD TEST: CPT

## 2022-10-31 PROCEDURE — 85007 BL SMEAR W/DIFF WBC COUNT: CPT | Performed by: STUDENT IN AN ORGANIZED HEALTH CARE EDUCATION/TRAINING PROGRAM

## 2022-10-31 PROCEDURE — 94799 UNLISTED PULMONARY SVC/PX: CPT

## 2022-10-31 PROCEDURE — 84100 ASSAY OF PHOSPHORUS: CPT | Performed by: STUDENT IN AN ORGANIZED HEALTH CARE EDUCATION/TRAINING PROGRAM

## 2022-10-31 PROCEDURE — 85025 COMPLETE CBC W/AUTO DIFF WBC: CPT | Performed by: STUDENT IN AN ORGANIZED HEALTH CARE EDUCATION/TRAINING PROGRAM

## 2022-10-31 PROCEDURE — 63710000001 PREDNISONE PER 1 MG: Performed by: INTERNAL MEDICINE

## 2022-10-31 PROCEDURE — 99239 HOSP IP/OBS DSCHRG MGMT >30: CPT | Performed by: STUDENT IN AN ORGANIZED HEALTH CARE EDUCATION/TRAINING PROGRAM

## 2022-10-31 PROCEDURE — 80053 COMPREHEN METABOLIC PANEL: CPT | Performed by: STUDENT IN AN ORGANIZED HEALTH CARE EDUCATION/TRAINING PROGRAM

## 2022-10-31 PROCEDURE — 83735 ASSAY OF MAGNESIUM: CPT | Performed by: STUDENT IN AN ORGANIZED HEALTH CARE EDUCATION/TRAINING PROGRAM

## 2022-10-31 PROCEDURE — 99232 SBSQ HOSP IP/OBS MODERATE 35: CPT | Performed by: INTERNAL MEDICINE

## 2022-10-31 PROCEDURE — 25010000002 ENOXAPARIN PER 10 MG: Performed by: HOSPITALIST

## 2022-10-31 RX ORDER — NICOTINE 21 MG/24HR
1 PATCH, TRANSDERMAL 24 HOURS TRANSDERMAL
Qty: 28 PATCH | Refills: 0 | Status: SHIPPED | OUTPATIENT
Start: 2022-10-31 | End: 2022-11-28

## 2022-10-31 RX ORDER — IPRATROPIUM BROMIDE AND ALBUTEROL SULFATE 2.5; .5 MG/3ML; MG/3ML
3 SOLUTION RESPIRATORY (INHALATION) EVERY 4 HOURS PRN
Qty: 360 ML | Refills: 0 | Status: SHIPPED | OUTPATIENT
Start: 2022-10-31 | End: 2022-11-03 | Stop reason: SDUPTHER

## 2022-10-31 RX ORDER — GUAIFENESIN 600 MG/1
600 TABLET, EXTENDED RELEASE ORAL EVERY 12 HOURS SCHEDULED
Qty: 28 TABLET | Refills: 0 | Status: SHIPPED | OUTPATIENT
Start: 2022-10-31 | End: 2022-11-14

## 2022-10-31 RX ORDER — ASPIRIN 81 MG/1
81 TABLET ORAL DAILY
Qty: 30 TABLET | Refills: 0 | Status: SHIPPED | OUTPATIENT
Start: 2022-10-31 | End: 2022-11-30

## 2022-10-31 RX ORDER — PREDNISONE 20 MG/1
40 TABLET ORAL DAILY
Qty: 6 TABLET | Refills: 0 | Status: SHIPPED | OUTPATIENT
Start: 2022-10-31 | End: 2022-11-03

## 2022-10-31 RX ADMIN — GUAIFENESIN 1200 MG: 600 TABLET ORAL at 08:48

## 2022-10-31 RX ADMIN — ARFORMOTEROL TARTRATE 15 MCG: 15 SOLUTION RESPIRATORY (INHALATION) at 07:37

## 2022-10-31 RX ADMIN — NICOTINE 1 PATCH: 21 PATCH, EXTENDED RELEASE TRANSDERMAL at 08:52

## 2022-10-31 RX ADMIN — Medication 10 ML: at 08:49

## 2022-10-31 RX ADMIN — IPRATROPIUM BROMIDE AND ALBUTEROL SULFATE 3 ML: 2.5; .5 SOLUTION RESPIRATORY (INHALATION) at 07:37

## 2022-10-31 RX ADMIN — PANTOPRAZOLE SODIUM 40 MG: 40 TABLET, DELAYED RELEASE ORAL at 08:49

## 2022-10-31 RX ADMIN — ENOXAPARIN SODIUM 40 MG: 100 INJECTION SUBCUTANEOUS at 08:50

## 2022-10-31 RX ADMIN — BUDESONIDE 0.5 MG: 0.5 INHALANT ORAL at 07:37

## 2022-10-31 RX ADMIN — METOPROLOL SUCCINATE 25 MG: 25 TABLET, EXTENDED RELEASE ORAL at 08:49

## 2022-10-31 RX ADMIN — PREDNISONE 40 MG: 20 TABLET ORAL at 08:47

## 2022-10-31 RX ADMIN — ROSUVASTATIN CALCIUM 5 MG: 5 TABLET, FILM COATED ORAL at 08:49

## 2022-10-31 RX ADMIN — IPRATROPIUM BROMIDE AND ALBUTEROL SULFATE 3 ML: 2.5; .5 SOLUTION RESPIRATORY (INHALATION) at 00:54

## 2022-10-31 NOTE — OUTREACH NOTE
Prep Survey    Flowsheet Row Responses   [facility] facility patient discharged from? [facility]   Is LACE score < 7 ? No   Emergency Room discharge w/ pulse ox? No   Eligibility TCM   H[facility]   Date of Admission 10/25/22   Date of Discharge 10/31/22   Discharge Disposition Home-Health Care [facility]   Discharge diagnosis Acute hypoxic resp failure, PNA, Acute COPD exacerbation, ROBE   Does the patient have one of the following disease processes/diagnoses(primary or secondary)? Pneumonia   Does the patient have Home health ordered? Yes   What is the Home health agency?  [facility]   Is there a DME ordered? Yes   What DME was ordered? O2 & nebulizer from [facility]   Prep survey completed? Yes          RADHA GARCIA - Registered Nurse

## 2022-11-01 ENCOUNTER — TRANSITIONAL CARE MANAGEMENT TELEPHONE ENCOUNTER (OUTPATIENT)
Dept: CALL CENTER | Facility: HOSPITAL | Age: 72
End: 2022-11-01

## 2022-11-01 LAB
A1AT PHENOTYP SERPL IFE: ABNORMAL
A1AT SERPL-MCNC: 233 MG/DL (ref 101–187)
QT INTERVAL: 330 MS
QT INTERVAL: 352 MS

## 2022-11-01 NOTE — OUTREACH NOTE
Call Center TCM Note    Flowsheet Row Responses   [facility] [place] facility patient discharged from? [facility]   Does the patient have one of the following disease processes/diagnoses(primary or secondary)? Pneumonia   TCM attempt successful? No   Unsuccessful attempts Attempt 1          Paz Pabon RN    11/1/2022, 09:09 EDT

## 2022-11-01 NOTE — OUTREACH NOTE
Call Center TCM Note    Flowsheet Row Responses   [facility] [place] facility patient discharged from? [facility]   Does the patient have one of the following disease processes/diagnoses(primary or secondary)? Pneumonia   TCM attempt successful? No   Unsuccessful attempts Attempt 2          Paz Pabon RN    11/1/2022, 11:11 EDT

## 2022-11-02 ENCOUNTER — TRANSITIONAL CARE MANAGEMENT TELEPHONE ENCOUNTER (OUTPATIENT)
Dept: CALL CENTER | Facility: HOSPITAL | Age: 72
End: 2022-11-02

## 2022-11-02 NOTE — OUTREACH NOTE
Call Center TCM Note    Flowsheet Row Responses   [facility] patient discharged from? [facility]   Does the patient have one of the following disease processes/diagnoses(primary or secondary)? Pneumonia   TCM attempt successful? Yes  [No verbal release]   Call start time 0905   Call end time 0912   Discharge diagnosis Acute hypoxic resp failure, PNA, Acute COPD exacerbation, ROBE   Meds reviewed with patient/caregiver? Yes   Is the patient having any side effects they believe may be caused by any medication additions or changes? No   Does the patient have all medications ordered at discharge? Yes   Is the patient taking all medications as directed (includes completed medication regime)? Yes   Comments Hospital d/c f/u appt is on 11/7/22 at 11:00 am,  Appt with pulm is on 11/3/22   Does the patient have an appointment with their PCP within 7 days of discharge? Yes   What is the Home health agency?  [facility]    Has home health visited the patient within 72 hours of discharge? Yes   Has all DME been delivered? Yes   DME comments Pt wears oxygen PRN   Pulse Ox monitoring Intermittent   O2 Sat comments 98% on 2 L of O2    Psychosocial issues? No   Did the patient receive a copy of their discharge instructions? Yes   Nursing interventions Reviewed instructions with patient   What is the patient's perception of their health status since discharge? Improving   Are the patient's immunizations up to date?  Yes   If the patient is a current smoker, are they able to teach back resources for cessation? Not a smoker  [Pt sent home with patches. He's not smoked since he's been home.]   Is the patient/caregiver able to teach back the hierarchy of who to call/visit for symptoms/problems? PCP, Specialist, Home health nurse, Urgent Care, ED, 911 Yes   Is the patient able to teach back COPD zones? Yes   Nursing interventions Education provided on various zones   Patient reports what zone on this call? Green Zone   Green Zone  Reports doing well, Breathing without shortness of breath, Slept well last night, Appetite is good, Usual amounts of cough and phlegm/mucous   Green Zone interventions: Use oxygen as prescribed, Take daily medications, At all times avoid cigarette smoking, vaping and inhaled irritants   Is the patient/caregiver able to teach back signs and symptoms of worsening condition: Shortness of breath, Fever/chills, Chest pain   Is the patient/caregiver able to teach back importance of completing antibiotic course of treatment? --  [Not sent home with ABX ]   TCM call completed? Yes   Call end time 0912   Would this patient benefit from a Referral to [facility] Social Work? No   Is the patient interested in additional calls from an ambulatory ?  NOTE:  applies to high risk patients requiring additional follow-up. No          Joan Adams RN    11/2/2022, 09:13 EDT

## 2022-11-03 ENCOUNTER — OFFICE VISIT (OUTPATIENT)
Dept: PULMONOLOGY | Facility: CLINIC | Age: 72
End: 2022-11-03

## 2022-11-03 VITALS
HEIGHT: 71 IN | OXYGEN SATURATION: 94 % | RESPIRATION RATE: 16 BRPM | BODY MASS INDEX: 20.86 KG/M2 | DIASTOLIC BLOOD PRESSURE: 62 MMHG | SYSTOLIC BLOOD PRESSURE: 127 MMHG | WEIGHT: 149 LBS | HEART RATE: 76 BPM | TEMPERATURE: 98.2 F

## 2022-11-03 DIAGNOSIS — J44.9 CHRONIC OBSTRUCTIVE PULMONARY DISEASE, UNSPECIFIED COPD TYPE: Primary | ICD-10-CM

## 2022-11-03 DIAGNOSIS — J43.2 CENTRILOBULAR EMPHYSEMA: ICD-10-CM

## 2022-11-03 DIAGNOSIS — F17.211 NICOTINE DEPENDENCE, CIGARETTES, IN REMISSION: ICD-10-CM

## 2022-11-03 DIAGNOSIS — J96.01 ACUTE RESPIRATORY FAILURE WITH HYPOXIA: ICD-10-CM

## 2022-11-03 DIAGNOSIS — R06.2 WHEEZING: ICD-10-CM

## 2022-11-03 DIAGNOSIS — J18.9 PNEUMONIA OF RIGHT LOWER LOBE DUE TO INFECTIOUS ORGANISM: ICD-10-CM

## 2022-11-03 DIAGNOSIS — R91.8 ABNORMAL CT SCAN OF LUNG: ICD-10-CM

## 2022-11-03 DIAGNOSIS — R91.8 LUNG MASS: ICD-10-CM

## 2022-11-03 DIAGNOSIS — I51.89 DIASTOLIC DYSFUNCTION: ICD-10-CM

## 2022-11-03 DIAGNOSIS — R06.09 DYSPNEA ON EXERTION: ICD-10-CM

## 2022-11-03 PROCEDURE — 1111F DSCHRG MED/CURRENT MED MERGE: CPT | Performed by: NURSE PRACTITIONER

## 2022-11-03 PROCEDURE — 99215 OFFICE O/P EST HI 40 MIN: CPT | Performed by: NURSE PRACTITIONER

## 2022-11-03 PROCEDURE — G2212 PROLONG OUTPT/OFFICE VIS: HCPCS | Performed by: NURSE PRACTITIONER

## 2022-11-03 RX ORDER — IPRATROPIUM BROMIDE AND ALBUTEROL SULFATE 2.5; .5 MG/3ML; MG/3ML
3 SOLUTION RESPIRATORY (INHALATION) EVERY 4 HOURS PRN
Qty: 360 ML | Refills: 11 | Status: SHIPPED | OUTPATIENT
Start: 2022-11-03 | End: 2022-12-03

## 2022-11-03 RX ORDER — BUDESONIDE, GLYCOPYRROLATE, AND FORMOTEROL FUMARATE 160; 9; 4.8 UG/1; UG/1; UG/1
2 AEROSOL, METERED RESPIRATORY (INHALATION) 2 TIMES DAILY
Qty: 1 EACH | Refills: 11 | Status: SHIPPED | OUTPATIENT
Start: 2022-11-03

## 2022-11-03 NOTE — PATIENT INSTRUCTIONS
COPD and Physical Activity  Chronic obstructive pulmonary disease (COPD) is a long-term, or chronic, condition that affects the lungs. COPD is a general term that can be used to describe many problems that cause inflammation of the lungs and limit airflow. These conditions include chronic bronchitis and emphysema.  The main symptom of COPD is shortness of breath, which makes it harder to do even simple tasks. This can also make it harder to exercise and stay active. Talk with your health care provider about treatments to help you breathe better and actions you can take to prevent breathing problems during physical activity.  What are the benefits of exercising when you have COPD?  Exercising regularly is an important part of a healthy lifestyle. You can still exercise and do physical activities even though you have COPD. Exercise and physical activity improve your shortness of breath by increasing blood flow (circulation). This causes your heart to pump more oxygen through your body. Moderate exercise can:  Improve oxygen use.  Increase your energy level.  Help with shortness of breath.  Strengthen your breathing muscles.  Improve heart health.  Help with sleep.  Improve your self-esteem and feelings of self-worth.  Lower depression, stress, and anxiety.  Exercise can benefit everyone with COPD. The severity of your disease may affect how hard you can exercise, especially at first, but everyone can benefit. Talk with your health care provider about how much exercise is safe for you, and which activities and exercises are safe for you.  What actions can I take to prevent breathing problems during physical activity?  Sign up for a pulmonary rehabilitation program. This type of program may include:  Education about lung diseases.  Exercise classes that teach you how to exercise and be more active while improving your breathing. This usually involves:  Exercise using your lower extremities, such as a stationary  bicycle.  About 30 minutes of exercise, 2 to 5 times per week, for 6 to 12 weeks.  Strength training, such as push-ups or leg lifts.  Nutrition education.  Group classes in which you can talk with others who also have COPD and learn ways to manage stress.  If you use an oxygen tank, you should use it while you exercise. Work with your health care provider to adjust your oxygen for your physical activity. Your resting flow rate is different from your flow rate during physical activity.  How to manage your breathing while exercising  While you are exercising:  Take slow breaths.  Pace yourself, and do nottry to go too fast.  Purse your lips while breathing out. Pursing your lips is similar to a kissing or whistling position.  If doing exercise that uses a quick burst of effort, such as weight lifting:  Breathe in before starting the exercise.  Breathe out during the hardest part of the exercise, such as raising the weights.  Where to find support  You can find support for exercising with COPD from:  Your health care provider.  A pulmonary rehabilitation program.  Your local health department or community health programs.  Support groups, either online or in-person. Your health care provider may be able to recommend support groups.  Where to find more information  You can find more information about exercising with COPD from:  American Lung Association: lung.org  COPD Foundation: copdfoundation.org  Contact a health care provider if:  Your symptoms get worse.  You have nausea.  You have a fever.  You want to start a new exercise program or a new activity.  Get help right away if:  You have chest pain.  You cannot breathe.  These symptoms may represent a serious problem that is an emergency. Do not wait to see if the symptoms will go away. Get medical help right away. Call your local emergency services (911 in the [place] Do not drive yourself to the hospital.  Summary  COPD is a general term that can be used to describe  many different lung problems that cause lung inflammation and limit airflow. This includes chronic bronchitis and emphysema.  Exercise and physical activity improve your shortness of breath by increasing blood flow (circulation). This causes your heart to provide more oxygen to your body.  Contact your health care provider before starting any exercise program or new activity. Ask your health care provider what exercises and activities are safe for you.  This information is not intended to replace advice given to you by your health care provider. Make sure you discuss any questions you have with your health care provider.  Document Revised: 10/26/2021 Document Reviewed: 10/26/2021  [employer] Patient Education © 2022 [employer]    Smoking Tobacco Information, Adult  Smoking tobacco can be harmful to your health. Tobacco contains a poisonous (toxic), colorless chemical called nicotine. Nicotine is addictive. It changes the brain and can make it hard to stop smoking. Tobacco also has other toxic chemicals that can hurt your body and raise your risk of many cancers.  How can smoking tobacco affect me?  Smoking tobacco puts you at risk for:  Cancer. Smoking is most commonly associated with lung cancer, but can also lead to cancer in other parts of the body.  Chronic obstructive pulmonary disease (COPD). This is a long-term lung condition that makes it hard to breathe. It also gets worse over time.  High blood pressure (hypertension), heart disease, stroke, or heart attack.  Lung infections, such as pneumonia.  Cataracts. This is when the lenses in the eyes become clouded.  Digestive problems. This may include peptic ulcers, heartburn, and gastroesophageal reflux disease (GERD).  Oral health problems, such as gum disease and tooth loss.  Loss of taste and smell.  Smoking can affect your appearance by causing:  Wrinkles.  Yellow or stained teeth, fingers, and fingernails.  Smoking tobacco can also affect your social life,  because:  It may be challenging to find places to smoke when away from home. Many workplaces, restaurants, hotels, and public places are tobacco-free.  Smoking is expensive. This is due to the cost of tobacco and the long-term costs of treating health problems from smoking.  Secondhand smoke may affect those around you. Secondhand smoke can cause lung cancer, breathing problems, and heart disease. Children of smokers have a higher risk for:  Sudden infant death syndrome (SIDS).  Ear infections.  Lung infections.  If you currently smoke tobacco, quitting now can help you:  Lead a longer and healthier life.  Look, smell, breathe, and feel better over time.  Save money.  Protect others from the harms of secondhand smoke.  What actions can I take to prevent health problems?  Quit smoking    Do not start smoking. Quit if you already do.  Make a plan to quit smoking and commit to it. Look for programs to help you and ask your health care provider for recommendations and ideas.  Set a date and write down all the reasons you want to quit.  Let your friends and family know you are quitting so they can help and support you. Consider finding friends who also want to quit. It can be easier to quit with someone else, so that you can support each other.  Talk with your health care provider about using nicotine replacement medicines to help you quit, such as gum, lozenges, patches, sprays, or pills.  Do not replace cigarette smoking with electronic cigarettes, which are commonly called e-cigarettes. The safety of e-cigarettes is not known, and some may contain harmful chemicals.  If you try to quit but return to smoking, stay positive. It is common to slip up when you first quit, so take it one day at a time.  Be prepared for cravings. When you feel the urge to smoke, chew gum or suck on hard candy.  Lifestyle  Stay busy and take care of your body.  Drink enough fluid to keep your urine pale yellow.  Get plenty of exercise and eat  a healthy diet. This can help prevent weight gain after quitting.  Monitor your eating habits. Quitting smoking can cause you to have a larger appetite than when you smoke.  Find ways to relax. Go out with friends or family to a movie or a restaurant where people do not smoke.  Ask your health care provider about having regular tests (screenings) to check for cancer. This may include blood tests, imaging tests, and other tests.  Find ways to manage your stress, such as meditation, yoga, or exercise.  Where to find support  To get support to quit smoking, consider:  Asking your health care provider for more information and resources.  Taking classes to learn more about quitting smoking.  Looking for local organizations that offer resources about quitting smoking.  Joining a support group for people who want to quit smoking in your local community.  Calling the [employer] counselor helpline: 8-800-Quit-Now (1-514.714.8101)  Where to find more information  You may find more information about quitting smoking from:  HelpGuide.org: www.helpguide.org  Smokefree.gov: smokefree.gov  American Lung Association: www.lung.org  Contact a health care provider if you:  Have problems breathing.  Notice that your lips, nose, or fingers turn blue.  Have chest pain.  Are coughing up blood.  Feel faint or you pass out.  Have other health changes that cause you to worry.  Summary  Smoking tobacco can negatively affect your health, the health of those around you, your finances, and your social life.  Do not start smoking. Quit if you already do. If you need help quitting, ask your health care provider.  Think about joining a support group for people who want to quit smoking in your local community. There are many effective programs that will help you to quit this behavior.  This information is not intended to replace advice given to you by your health care provider. Make sure you discuss any questions you have with your health care  provider.  Document Revised: 08/22/2022 Document Reviewed: 11/09/2021  [employer] Patient Education © 2022 [employer]

## 2022-11-03 NOTE — PROGRESS NOTES
Primary Care Provider  Nancy Yeh DO     Referring Provider  No ref. provider found     Chief Complaint  COPD, Wheezing, and Hospital Follow Up Visit    Subjective          Cm Flores presents to [facility]  History of Present Illness  Cm Flores is a 71 y.o. male patient recently admitted to [facility] on 10/25/2022 and discharged on 10/31/2022.  He is here for a hospital follow-up visit.    Per patient's discharge summary, he presented with complaints of shortness of breath.  Was admitted and treated for COPD exacerbation, acute respiratory failure with hypoxia and community-acquired pneumonia.  Patient was evaluated by pulmonary during her stay and he will follow-up as an outpatient.  Patient was given a nebulizer with duo nebs and is to continue with Breztri.  Patient did decline rehab at the time of discharge, and stated that he would like to have home health instead.  Patient did have a CT scan while in the hospital which showed an increasing left perihilar mass with significant fat component, likely a hamartoma.  Risk factors for malignancy, Dr. Solares does recommend a outpatient PET scan.  Patient did complete 5 days of antibiotics with ceftriaxone.  He was encouraged to continue with his incentive spirometer and flutter valve.    Patient states he is doing well since discharge from the hospital.  He states that his cough has improved.  It is no longer productive.  Patient states that prior to the hospital, he was having yellow-colored sputum.  He denies any fevers or chills.  Patient states that he has not had a prior diagnosis of COPD.  He denies any family history of lung diseases.  Patient did have inhalers prescribed to him previously, but states that he did not use them until the week prior to his hospitalization.  Patient states that he did go to his primary care provider and received 2 injections and later that night he  proceeded to the emergency room.  He states that he has been using his Breztri as prescribed and using his duo nebs 3 times a day.  He states that he has not needed to use his albuterol inhaler.  He has also been using Mucinex twice a day.  He is on the last day of his prednisone currently.  He does mention some occasional wheezing.  He is currently on 2 L of oxygen, but states that he was not previously on oxygen before this hospital visit.  Explained to patient that he can wean his oxygen as he is comfortable as long as her saturations are above 90%.  Patient and family verbalized understanding.  He continues to use his incentive spirometer and flutter valve from the hospital.  Patient is a former smoker and quit smoking in October 2021.  He states that he smoked roughly 1 pack of cigarettes for approximately 50 years.  Patient does continue to wear nicotine patches every day, as he is wanting to return to smoking.  I have discussed patient's CT scan with him and his family and they are agreeable to having a PET scan ordered and performed to evaluate the left perihilar mass.  We will also order a CT scan in January to assess for resolution of pneumonia.  Patient has not had a pulmonary function test in the past, and they are agreeable to having this ordered as well.  He will have a 6-minute walk at that time to evaluate his need for oxygen.  I have also discussed patient's alpha-1 results with him and family.  Patient is a genotype M/M with a level of 233.  Explained that he does not have a genetic form of emphysema and that it is from his history of smoking.  Overall, patient has no additional questions at this time.  Our nurse navigator will speak with patient about COPD, breathing exercises, action plans, etc.  He is able to perform his ADLs without difficulty.  He refuses vaccines at this time.     His history of smoking is   Tobacco Use: Medium Risk   • Smoking Tobacco Use: Former   • Smokeless Tobacco Use:  Never   • Passive Exposure: Not on file   .    Review of Systems   Constitutional: Negative for chills, fatigue, fever, unexpected weight gain and unexpected weight loss.   HENT: Negative for congestion (Nasal), hearing loss, mouth sores, nosebleeds, postnasal drip, sore throat and trouble swallowing.    Eyes: Negative for blurred vision and visual disturbance.   Respiratory: Positive for cough, shortness of breath and wheezing. Negative for apnea.         Negative for Hemoptysis     Cardiovascular: Negative for chest pain, palpitations and leg swelling.   Gastrointestinal: Negative for abdominal pain, constipation, diarrhea, nausea, vomiting and GERD.        Negative for Jaundice  Negative for Bloating  Negative for Melena   Musculoskeletal: Negative for joint swelling and myalgias.        Negative for Joint pain  Negative for Joint stiffness   Skin: Negative for color change.        Negative for cyanosis   Neurological: Negative for syncope, weakness, numbness and headache.      Sleep: Negative for Excessive daytime sleepiness  Negative for morning headaches  Negative for Snoring    Family History   Problem Relation Age of Onset   • Heart disease Other         Social History     Socioeconomic History   • Marital status: Single   Tobacco Use   • Smoking status: Former     Packs/day: 1.00     Years: 50.00     Pack years: 50.00     Types: Cigarettes     Start date: 1972     Quit date: 10/1/2021     Years since quittin.0   • Smokeless tobacco: Never   • Tobacco comments:     smokes a average of 1ppw, no second hand smoke exposure now .   Vaping Use   • Vaping Use: Never used   Substance and Sexual Activity   • Alcohol use: Never   • Drug use: Never   • Sexual activity: Defer        Past Medical History:   Diagnosis Date   • Anxiety    • Arthritis    • Bladder cancer (HCC)    • Bladder cancer (HCC)     lining of bladder    • Broken bones      as a child    • CAD (coronary artery disease) 2014   •  Calculus of kidney    • Cataracts, both eyes    • CHF (congestive heart failure) ([facility])    • Constipation    • Deafness    • Depression    • Essential hypertension 6/24/2021   • Forgetfulness    • Gallstones    • Head injury    • Heart disease    • Hemorrhoids    • Hernia of pelvic floor    • High cholesterol    • High cholesterol    • History of bladder cancer 6/24/2021   • History of heart attack    • Hypertension    • Kidney stones    • Mixed hyperlipidemia 6/24/2021   • Primary insomnia 6/24/2021   • Shortness of breath    • Sinus trouble    • Skin disease     UNSURE WHAT KIND/ARMS   • Tobacco abuse 6/24/2021        Immunization History   Administered Date(s) Administered   • Zostavax 12/18/2014         Allergies   Allergen Reactions   • Azithromycin Unknown - High Severity     zpak   • Quinolones Other (See Comments)     History of abdominal aortic aneurysm repair   • Morphine Unknown - Low Severity   • Penicillin G Unknown - Low Severity   • Penicillins Unknown - Low Severity   • Sulfa Antibiotics Unknown - Low Severity          Current Outpatient Medications:   •  albuterol sulfate  (90 Base) MCG/ACT inhaler, Inhale 2 puffs Every 4 (Four) Hours As Needed for Wheezing., Disp: 1 g, Rfl: 5  •  ALPRAZolam (XANAX) 0.25 MG tablet, Take 1 tablet by mouth At Night As Needed for Anxiety., Disp: 30 tablet, Rfl: 5  •  aspirin 81 MG EC tablet, Take 1 tablet by mouth Daily for 30 days., Disp: 30 tablet, Rfl: 0  •  Budeson-Glycopyrrol-Formoterol (Breztri Aerosphere) 160-9-4.8 MCG/ACT aerosol inhaler, Inhale 2 puffs 2 (Two) Times a Day., Disp: 1 each, Rfl: 11  •  guaiFENesin (MUCINEX) 600 MG 12 hr tablet, Take 1 tablet by mouth Every 12 (Twelve) Hours for 14 days., Disp: 28 tablet, Rfl: 0  •  ipratropium-albuterol (DUO-NEB) 0.5-2.5 mg/3 ml nebulizer, Take 3 mL by nebulization Every 4 (Four) Hours As Needed for Wheezing for up to 30 days. Indications: Chronic Obstructive Lung Disease, Disp: 360 mL, Rfl: 11  •   metoprolol succinate XL (TOPROL-XL) 25 MG 24 hr tablet, Take 1 tablet by mouth Daily., Disp: 90 tablet, Rfl: 1  •  nicotine (NICODERM CQ) 21 MG/24HR patch, Place 1 patch on the skin as directed by provider Daily for 28 days. Indications: Nicotine Addiction, Disp: 28 patch, Rfl: 0  •  pantoprazole (PROTONIX) 40 MG EC tablet, Take 1 tablet by mouth Daily., Disp: , Rfl:   •  predniSONE (DELTASONE) 20 MG tablet, Take 2 tablets by mouth Daily for 3 days., Disp: 6 tablet, Rfl: 0  •  rosuvastatin (CRESTOR) 5 MG tablet, Take 1 tablet by mouth Daily., Disp: 90 tablet, Rfl: 1     Objective   Physical Exam  Constitutional:       General: He is not in acute distress.     Appearance: Normal appearance. He is normal weight.   HENT:      Right Ear: Hearing normal.      Left Ear: Hearing normal.      Nose: No nasal tenderness or congestion.      Mouth/Throat:      Mouth: Mucous membranes are moist. No oral lesions.   Eyes:      Extraocular Movements: Extraocular movements intact.      Pupils: Pupils are equal, round, and reactive to light.   Neck:      Thyroid: No thyroid mass or thyromegaly.   Cardiovascular:      Rate and Rhythm: Normal rate and regular rhythm.      Pulses: Normal pulses.      Heart sounds: Normal heart sounds. No murmur heard.  Pulmonary:      Effort: Pulmonary effort is normal.      Breath sounds: Decreased breath sounds present. No wheezing, rhonchi or rales.      Comments: Patient is on 2 L of oxygen.  He is able to speak full sentences without difficulty.  Abdominal:      General: Bowel sounds are normal. There is no distension.      Palpations: Abdomen is soft.      Tenderness: There is no abdominal tenderness.   Musculoskeletal:      Cervical back: Neck supple.      Right lower leg: No edema.      Left lower leg: No edema.   Lymphadenopathy:      Cervical: No cervical adenopathy.      Upper Body:      Right upper body: No axillary adenopathy.   Skin:     General: Skin is warm and dry.      Findings: No  "lesion or rash.   Neurological:      General: No focal deficit present.      Mental Status: He is alert and oriented to person, place, and time.      Cranial Nerves: Cranial nerves are intact.   Psychiatric:         Mood and Affect: Affect normal. Mood is not anxious or depressed.         Vital Signs:   /62 (BP Location: Left arm, Patient Position: Sitting, Cuff Size: Adult)   Pulse 76   Temp 98.2 °F (36.8 °C) (Temporal)   Resp 16   Ht 180.3 cm (71\")   Wt 67.6 kg (149 lb)   SpO2 94% Comment: 2 L's continous  BMI 20.78 kg/m²        Result Review :     CMP    CMP 10/29/22 10/30/22 10/31/22   Glucose 164 (A) 188 (A) 147 (A)   BUN 40 (A) 42 (A) 41 (A)   Creatinine 1.25 1.43 (A) 1.39 (A)   Sodium 138 134 (A) 138   Potassium 5.1 5.6 (A) 4.8   Chloride 101 98 97 (A)   Calcium 9.6 9.8 9.9   Albumin 3.60 3.70 4.00   Total Bilirubin 0.3 0.3 0.3   Alkaline Phosphatase 51 52 51   AST (SGOT) 14 12 15   ALT (SGPT) 9 10 13   (A) Abnormal value            CBC w/diff    CBC w/Diff 10/29/22 10/30/22 10/31/22   WBC 9.94 8.48 8.44   RBC 3.59 (A) 3.75 (A) 3.90 (A)   Hemoglobin 12.5 (A) 12.7 (A) 13.2   Hematocrit 35.7 (A) 36.7 (A) 38.1   MCV 99.4 (A) 97.9 (A) 97.7 (A)   MCH 34.8 (A) 33.9 (A) 33.8 (A)   MCHC 35.0 34.6 34.6   RDW 13.1 12.8 12.8   Platelets 117 (A) 113 (A) 127 (A)   Neutrophil Rel % 77.0 (A) 71.7 62.0   Immature Granulocyte Rel % 2.2 (A) 6.0 (A) 10.2 (A)   Lymphocyte Rel % 14.3 (A) 15.2 (A) 19.3 (A)   Monocyte Rel % 6.2 6.6 7.2   Eosinophil Rel % 0.0 (A) 0.0 (A) 0.2 (A)   Basophil Rel % 0.3 0.5 1.1   (A) Abnormal value          Personally reviewed alpha-1 genotype MM with a level of 233 on 10/27/2022    Data reviewed: Radiologic studies Chest x-ray 10/25/2022, chest CT 10/27/2022 moderate centrilobular emphysema centrilobular emphysema centrilobular emphysema, 3 cm lobulated left perihilar mass and a posterior right lower lobe pneumonia, Cardiology studies Echocardiogram showing grade 1 diastolic dysfunction " 10/28/2022, Consultant notes Dr. Castaneda consult note 10/27/2022 and Recent hospitalization notes Hospital discharge summary 10/31/2022   Procedures        Assessment and Plan    Diagnoses and all orders for this visit:    1. Chronic obstructive pulmonary disease, unspecified COPD type (HCC) (Primary)  -     Full Pulmonary Function Test With Bronchodilator; Future  -     ipratropium-albuterol (DUO-NEB) 0.5-2.5 mg/3 ml nebulizer; Take 3 mL by nebulization Every 4 (Four) Hours As Needed for Wheezing for up to 30 days. Indications: Chronic Obstructive Lung Disease  Dispense: 360 mL; Refill: 11  -     Budeson-Glycopyrrol-Formoterol (Breztri Aerosphere) 160-9-4.8 MCG/ACT aerosol inhaler; Inhale 2 puffs 2 (Two) Times a Day.  Dispense: 1 each; Refill: 11    2. Abnormal CT scan of lung  -     NM PET/CT Skull Base to Mid Thigh; Future    3. Centrilobular emphysema (HCC)  -     Full Pulmonary Function Test With Bronchodilator; Future  -     ipratropium-albuterol (DUO-NEB) 0.5-2.5 mg/3 ml nebulizer; Take 3 mL by nebulization Every 4 (Four) Hours As Needed for Wheezing for up to 30 days. Indications: Chronic Obstructive Lung Disease  Dispense: 360 mL; Refill: 11  -     Budeson-Glycopyrrol-Formoterol (Breztri Aerosphere) 160-9-4.8 MCG/ACT aerosol inhaler; Inhale 2 puffs 2 (Two) Times a Day.  Dispense: 1 each; Refill: 11    4. Dyspnea on exertion  -     Full Pulmonary Function Test With Bronchodilator; Future    5. Acute respiratory failure with hypoxia (HCC)  -     6 Minute Walk Test; Future    6. Nicotine dependence, cigarettes, in remission    7. Lung mass    8. Pneumonia of right lower lobe due to infectious organism  -     CT Chest Without Contrast; Future    9. Wheezing    10. Diastolic dysfunction    11.  Continue Breztri as prescribed.  Rinse mouth out after each use.  Refills sent to pharmacy.  12.  Continue albuterol and DuoNebs as needed.  13.  Continue supplemental oxygen to maintain saturations at or above 90%.  14.   PET scan ordered to evaluate left perihilar mass.  15.  Chest CT ordered to evaluate for resolution of pneumonia.  16.  PFT ordered for lung function along with a 6-minute walk to evaluate patient's need for oxygen.  17.  Additional education provided by nurse navigator today in office including COPD management, breathing exercises, inhaler use, COPD action plan, nebulizer treatments, etc.  18.  Follow-up in 3 months with Jacobo, sooner if needed.    I spent 108 minutes caring for Cm on this date of service. This time includes time spent by me in the following activities:preparing for the visit, reviewing tests, obtaining and/or reviewing a separately obtained history, performing a medically appropriate examination and/or evaluation , counseling and educating the patient/family/caregiver, ordering medications, tests, or procedures, referring and communicating with other health care professionals  and documenting information in the medical record    Follow Up   Return in about 3 months (around 2/3/2023) for Recheck with Jacobo .  Patient was given instructions and counseling regarding his condition or for health maintenance advice. Please see specific information pulled into the AVS if appropriate.

## 2022-11-07 ENCOUNTER — OFFICE VISIT (OUTPATIENT)
Dept: FAMILY MEDICINE CLINIC | Facility: CLINIC | Age: 72
End: 2022-11-07

## 2022-11-07 VITALS
HEART RATE: 95 BPM | RESPIRATION RATE: 18 BRPM | TEMPERATURE: 97.2 F | SYSTOLIC BLOOD PRESSURE: 122 MMHG | WEIGHT: 139.2 LBS | DIASTOLIC BLOOD PRESSURE: 62 MMHG | OXYGEN SATURATION: 95 % | HEIGHT: 71 IN | BODY MASS INDEX: 19.49 KG/M2

## 2022-11-07 DIAGNOSIS — Z00.00 ANNUAL PHYSICAL EXAM: ICD-10-CM

## 2022-11-07 DIAGNOSIS — Z09 HOSPITAL DISCHARGE FOLLOW-UP: Primary | ICD-10-CM

## 2022-11-07 DIAGNOSIS — I10 ESSENTIAL HYPERTENSION: Chronic | ICD-10-CM

## 2022-11-07 DIAGNOSIS — Z12.5 SCREENING FOR PROSTATE CANCER: ICD-10-CM

## 2022-11-07 DIAGNOSIS — E78.2 MIXED HYPERLIPIDEMIA: Chronic | ICD-10-CM

## 2022-11-07 PROBLEM — J44.1 COPD WITH EXACERBATION: Status: RESOLVED | Noted: 2022-10-25 | Resolved: 2022-11-07

## 2022-11-07 PROBLEM — J44.1 COPD EXACERBATION: Status: RESOLVED | Noted: 2022-10-25 | Resolved: 2022-11-07

## 2022-11-07 PROCEDURE — 1111F DSCHRG MED/CURRENT MED MERGE: CPT | Performed by: FAMILY MEDICINE

## 2022-11-07 PROCEDURE — 99495 TRANSJ CARE MGMT MOD F2F 14D: CPT | Performed by: FAMILY MEDICINE

## 2022-11-07 NOTE — PROGRESS NOTES
"Chief Complaint  Hospital Follow Up Visit    Subjective        Cm Flores presents to [facility]  History of Present Illness  He is here today for follow-up for the management of his chronic medical condition and for a hospital follow-up.  He does not have any children.  He has a history of kidney stones.  He has coronary artery disease, peripheral vascular disease, tobacco abuse, history of AAA repair in 2016 and bladder cancer in 2008.    He was having SOB and decided to go to the ER. He was hospitalized with pneumonia and COPD flare.      He has a 50 pack year smoking habit and he stopped smoking last year.     He has been managed by hematology for thrombocytopenia.     He has lost 10 lbs since his last visit.     The patient has no complaints today and denies chest pain, shortness of breath, weakness, numbness, nausea, vomiting, diarrhea, dizziness or syncopal event      Objective   Vital Signs:  /62 (BP Location: Left arm, Patient Position: Sitting)   Pulse 95   Temp 97.2 °F (36.2 °C)   Resp 18   Ht 180.3 cm (70.98\")   Wt 63.1 kg (139 lb 3.2 oz)   SpO2 95%   BMI 19.42 kg/m²   Estimated body mass index is 19.42 kg/m² as calculated from the following:    Height as of this encounter: 180.3 cm (70.98\").    Weight as of this encounter: 63.1 kg (139 lb 3.2 oz).    BMI is within normal parameters. No other follow-up for BMI required.      Physical Exam  Vitals reviewed.   Constitutional:       Appearance: Normal appearance. He is well-developed.   HENT:      Head: Normocephalic and atraumatic.      Right Ear: External ear normal.      Left Ear: External ear normal.      Mouth/Throat:      Pharynx: No oropharyngeal exudate.   Eyes:      Conjunctiva/sclera: Conjunctivae normal.      Pupils: Pupils are equal, round, and reactive to light.   Neck:      Vascular: No carotid bruit.   Cardiovascular:      Rate and Rhythm: Normal rate and regular rhythm.      Heart sounds: No " murmur heard.    No friction rub. No gallop.   Pulmonary:      Effort: Pulmonary effort is normal.      Breath sounds: Normal breath sounds. No wheezing or rhonchi.   Abdominal:      General: There is no distension.   Skin:     General: Skin is warm and dry.   Neurological:      Mental Status: He is alert and oriented to person, place, and time.      Cranial Nerves: No cranial nerve deficit.      Motor: No weakness.   Psychiatric:         Mood and Affect: Mood and affect normal.         Behavior: Behavior normal.         Thought Content: Thought content normal.         Judgment: Judgment normal.        Result Review :    CMP    CMP 10/29/22 10/30/22 10/31/22   Glucose 164 (A) 188 (A) 147 (A)   BUN 40 (A) 42 (A) 41 (A)   Creatinine 1.25 1.43 (A) 1.39 (A)   Sodium 138 134 (A) 138   Potassium 5.1 5.6 (A) 4.8   Chloride 101 98 97 (A)   Calcium 9.6 9.8 9.9   Albumin 3.60 3.70 4.00   Total Bilirubin 0.3 0.3 0.3   Alkaline Phosphatase 51 52 51   AST (SGOT) 14 12 15   ALT (SGPT) 9 10 13   (A) Abnormal value            CBC    CBC 10/29/22 10/30/22 10/31/22   WBC 9.94 8.48 8.44   RBC 3.59 (A) 3.75 (A) 3.90 (A)   Hemoglobin 12.5 (A) 12.7 (A) 13.2   Hematocrit 35.7 (A) 36.7 (A) 38.1   MCV 99.4 (A) 97.9 (A) 97.7 (A)   MCH 34.8 (A) 33.9 (A) 33.8 (A)   MCHC 35.0 34.6 34.6   RDW 13.1 12.8 12.8   Platelets 117 (A) 113 (A) 127 (A)   (A) Abnormal value            Lipid Panel    Lipid Panel 12/16/21   Total Cholesterol 135   Triglycerides 153 (A)   HDL Cholesterol 42   VLDL Cholesterol 26   LDL Cholesterol  67   LDL/HDL Ratio 1.49   (A) Abnormal value            TSH    TSH 12/16/21 3/24/22   TSH 3.410 1.530                     Assessment and Plan   Diagnoses and all orders for this visit:    1. Hospital discharge follow-up (Primary)  Comments:  He is doing well since DC. Will follow-up with pulmonology.     2. Annual physical exam  -     Comprehensive Metabolic Panel; Future  -     CBC & Differential; Future  -     TSH; Future    3.  Mixed hyperlipidemia  -     Lipid Panel; Future    4. Screening for prostate cancer  -     PSA Screen; Future    5. Essential hypertension  Assessment & Plan:  Hypertension is improving with treatment.  Continue current treatment regimen.  Blood pressure will be reassessed at the next regular appointment.             Follow Up   Return in about 3 months (around 2/7/2023).  Patient was given instructions and counseling regarding his condition or for health maintenance advice. Please see specific information pulled into the AVS if appropriate.

## 2022-11-09 ENCOUNTER — READMISSION MANAGEMENT (OUTPATIENT)
Dept: CALL CENTER | Facility: HOSPITAL | Age: 72
End: 2022-11-09

## 2022-11-09 NOTE — OUTREACH NOTE
COPD/PN Week 2 Survey    Flowsheet Row Responses   [facility] facility patient discharged from? [facility]   Does the patient have one of the following disease processes/diagnoses(primary or secondary)? Pneumonia   Week 2 attempt successful? No   Unsuccessful attempts Attempt 1          CORNELIA Gray Registered Nurse

## 2022-11-23 ENCOUNTER — HOSPITAL ENCOUNTER (OUTPATIENT)
Dept: PET IMAGING | Facility: HOSPITAL | Age: 72
Discharge: HOME | End: 2022-11-23

## 2022-11-23 DIAGNOSIS — R91.8 ABNORMAL CT SCAN OF LUNG: ICD-10-CM

## 2022-11-23 PROCEDURE — 78815 PET IMAGE W/CT SKULL-THIGH: CPT

## 2022-11-23 PROCEDURE — 0 FLUDEOXYGLUCOSE F18 SOLUTION: Performed by: NURSE PRACTITIONER

## 2022-11-23 PROCEDURE — A9552 F18 FDG: HCPCS | Performed by: NURSE PRACTITIONER

## 2022-11-23 RX ADMIN — FLUDEOXYGLUCOSE F18 1 DOSE: 300 INJECTION INTRAVENOUS at 12:51

## 2023-02-07 ENCOUNTER — OFFICE VISIT (OUTPATIENT)
Dept: FAMILY MEDICINE CLINIC | Facility: CLINIC | Age: 73
End: 2023-02-07
Payer: MEDICARE

## 2023-02-07 VITALS
OXYGEN SATURATION: 95 % | SYSTOLIC BLOOD PRESSURE: 121 MMHG | TEMPERATURE: 97.2 F | WEIGHT: 149.6 LBS | HEIGHT: 71 IN | RESPIRATION RATE: 18 BRPM | DIASTOLIC BLOOD PRESSURE: 67 MMHG | BODY MASS INDEX: 20.94 KG/M2 | HEART RATE: 81 BPM

## 2023-02-07 DIAGNOSIS — I10 ESSENTIAL HYPERTENSION: Chronic | ICD-10-CM

## 2023-02-07 DIAGNOSIS — E78.2 MIXED HYPERLIPIDEMIA: Chronic | ICD-10-CM

## 2023-02-07 DIAGNOSIS — F41.1 GAD (GENERALIZED ANXIETY DISORDER): Primary | ICD-10-CM

## 2023-02-07 DIAGNOSIS — Z51.81 MEDICATION MONITORING ENCOUNTER: ICD-10-CM

## 2023-02-07 PROBLEM — I21.9 MYOCARDIAL INFARCTION: Chronic | Status: ACTIVE | Noted: 2021-11-29

## 2023-02-07 PROBLEM — R10.84 GENERALIZED ABDOMINAL PAIN: Chronic | Status: ACTIVE | Noted: 2022-04-15

## 2023-02-07 PROBLEM — Z09 HOSPITAL DISCHARGE FOLLOW-UP: Status: RESOLVED | Noted: 2022-11-07 | Resolved: 2023-02-07

## 2023-02-07 PROCEDURE — 99214 OFFICE O/P EST MOD 30 MIN: CPT | Performed by: FAMILY MEDICINE

## 2023-02-07 PROCEDURE — 80305 DRUG TEST PRSMV DIR OPT OBS: CPT | Performed by: FAMILY MEDICINE

## 2023-02-07 NOTE — PROGRESS NOTES
"Chief Complaint  Hypertension, Congestive Heart Failure, COPD, and Anxiety (Follow up for xanax )    Subjective        Cm Flores presents to [facility]  History of Present Illness  He is here today for follow-up for the management of his chronic medical condition.  He does not have any children.  He has a history of kidney stones.  He has coronary artery disease, peripheral vascular disease, tobacco abuse, history of AAA repair in 2016 and bladder cancer in 2008.     He has a 50 pack year smoking habit and he stopped smoking last year.     He has been managed by hematology for thrombocytopenia.     He has gained 10 lbs since his last visit.     The patient has no complaints today and denies chest pain, shortness of breath, weakness, numbness, nausea, vomiting, diarrhea, dizziness or syncopal event         Objective   Vital Signs:  /67   Pulse 81   Temp 97.2 °F (36.2 °C)   Resp 18   Ht 180.3 cm (70.98\")   Wt 67.9 kg (149 lb 9.6 oz)   SpO2 95%   BMI 20.87 kg/m²   Estimated body mass index is 20.87 kg/m² as calculated from the following:    Height as of this encounter: 180.3 cm (70.98\").    Weight as of this encounter: 67.9 kg (149 lb 9.6 oz).       BMI is within normal parameters. No other follow-up for BMI required.      Physical Exam  Vitals reviewed.   Constitutional:       Appearance: Normal appearance. He is well-developed.   HENT:      Head: Normocephalic and atraumatic.      Right Ear: External ear normal.      Left Ear: External ear normal.      Mouth/Throat:      Pharynx: No oropharyngeal exudate.   Eyes:      Conjunctiva/sclera: Conjunctivae normal.      Pupils: Pupils are equal, round, and reactive to light.   Neck:      Vascular: No carotid bruit.   Cardiovascular:      Rate and Rhythm: Normal rate and regular rhythm.      Heart sounds: No murmur heard.    No friction rub. No gallop.   Pulmonary:      Effort: Pulmonary effort is normal.      Breath sounds: " Normal breath sounds. No wheezing or rhonchi.   Abdominal:      General: There is no distension.   Skin:     General: Skin is warm and dry.   Neurological:      Mental Status: He is alert and oriented to person, place, and time.      Cranial Nerves: No cranial nerve deficit.      Motor: No weakness.   Psychiatric:         Mood and Affect: Mood and affect normal.         Behavior: Behavior normal.         Thought Content: Thought content normal.         Judgment: Judgment normal.        Result Review :    CMP    CMP 10/29/22 10/30/22 10/31/22   Glucose 164 (A) 188 (A) 147 (A)   BUN 40 (A) 42 (A) 41 (A)   Creatinine 1.25 1.43 (A) 1.39 (A)   eGFR 61.9 52.7 (A) 54.5 (A)   Sodium 138 134 (A) 138   Potassium 5.1 5.6 (A) 4.8   Chloride 101 98 97 (A)   Calcium 9.6 9.8 9.9   Total Protein 6.3 6.3 6.3   Albumin 3.60 3.70 4.00   Globulin 2.7 2.6 2.3   Total Bilirubin 0.3 0.3 0.3   Alkaline Phosphatase 51 52 51   AST (SGOT) 14 12 15   ALT (SGPT) 9 10 13   Albumin/Globulin Ratio 1.3 1.4 1.7   BUN/Creatinine Ratio 32.0 (A) 29.4 (A) 29.5 (A)   Anion Gap 10.3 6.6 10.4   (A) Abnormal value       Comments are available for some flowsheets but are not being displayed.           CBC    CBC 10/29/22 10/30/22 10/31/22   WBC 9.94 8.48 8.44   RBC 3.59 (A) 3.75 (A) 3.90 (A)   Hemoglobin 12.5 (A) 12.7 (A) 13.2   Hematocrit 35.7 (A) 36.7 (A) 38.1   MCV 99.4 (A) 97.9 (A) 97.7 (A)   MCH 34.8 (A) 33.9 (A) 33.8 (A)   MCHC 35.0 34.6 34.6   RDW 13.1 12.8 12.8   Platelets 117 (A) 113 (A) 127 (A)   (A) Abnormal value                TSH    TSH 3/24/22   TSH 1.530                        Assessment and Plan   Diagnoses and all orders for this visit:    1. ADAMA (generalized anxiety disorder) (Primary)  Assessment & Plan:  Psychological condition is improving with treatment.  Continue current treatment regimen.  Psychological condition  will be reassessed at the next regular appointment.      2. Medication monitoring encounter  -     POC Urine Drug Screen,  Triage    3. Essential hypertension  Assessment & Plan:  Hypertension is improving with treatment.  Continue current treatment regimen.  Dietary sodium restriction.  Weight loss.  Blood pressure will be reassessed at the next regular appointment.      4. Mixed hyperlipidemia  Assessment & Plan:  Lipid abnormalities are improving with treatment.  Nutritional counseling was provided. and Pharmacotherapy as ordered.  Lipids will be reassessed in 6 months.    The 10-year ASCVD risk score (Slim PAYNE, et al., 2019) is: 18.3%    Values used to calculate the score:      Age: 72 years      Sex: Male      Is Non- : No      Diabetic: No      Tobacco smoker: No      Systolic Blood Pressure: 121 mmHg      Is BP treated: Yes      HDL Cholesterol: 42 mg/dL      Total Cholesterol: 135 mg/dL               Follow Up   Return in about 6 months (around 8/7/2023) for Medicare Wellness.  Patient was given instructions and counseling regarding his condition or for health maintenance advice. Please see specific information pulled into the AVS if appropriate.

## 2023-02-07 NOTE — ASSESSMENT & PLAN NOTE
Lipid abnormalities are improving with treatment.  Nutritional counseling was provided. and Pharmacotherapy as ordered.  Lipids will be reassessed in 6 months.    The 10-year ASCVD risk score (Slim PAYNE, et al., 2019) is: 18.3%    Values used to calculate the score:      Age: 72 years      Sex: Male      Is Non- : No      Diabetic: No      Tobacco smoker: No      Systolic Blood Pressure: 121 mmHg      Is BP treated: Yes      HDL Cholesterol: 42 mg/dL      Total Cholesterol: 135 mg/dL

## 2023-02-09 ENCOUNTER — LAB (OUTPATIENT)
Dept: LAB | Facility: HOSPITAL | Age: 73
End: 2023-02-09
Payer: MEDICARE

## 2023-02-09 DIAGNOSIS — Z00.00 ANNUAL PHYSICAL EXAM: ICD-10-CM

## 2023-02-09 DIAGNOSIS — E78.2 MIXED HYPERLIPIDEMIA: Chronic | ICD-10-CM

## 2023-02-09 DIAGNOSIS — Z12.5 SCREENING FOR PROSTATE CANCER: ICD-10-CM

## 2023-02-09 LAB
ALBUMIN SERPL-MCNC: 4.1 G/DL (ref 3.5–5.2)
ALBUMIN/GLOB SERPL: 1.6 G/DL
ALP SERPL-CCNC: 55 U/L (ref 39–117)
ALT SERPL W P-5'-P-CCNC: 5 U/L (ref 1–41)
ANION GAP SERPL CALCULATED.3IONS-SCNC: 7 MMOL/L (ref 5–15)
AST SERPL-CCNC: 16 U/L (ref 1–40)
BASOPHILS # BLD AUTO: 0.04 10*3/MM3 (ref 0–0.2)
BASOPHILS NFR BLD AUTO: 0.7 % (ref 0–1.5)
BILIRUB SERPL-MCNC: 0.7 MG/DL (ref 0–1.2)
BUN SERPL-MCNC: 12 MG/DL (ref 8–23)
BUN/CREAT SERPL: 7.5 (ref 7–25)
CALCIUM SPEC-SCNC: 9.4 MG/DL (ref 8.6–10.5)
CHLORIDE SERPL-SCNC: 100 MMOL/L (ref 98–107)
CHOLEST SERPL-MCNC: 142 MG/DL (ref 0–200)
CO2 SERPL-SCNC: 31 MMOL/L (ref 22–29)
CREAT SERPL-MCNC: 1.6 MG/DL (ref 0.76–1.27)
DEPRECATED RDW RBC AUTO: 41.7 FL (ref 37–54)
EGFRCR SERPLBLD CKD-EPI 2021: 45.8 ML/MIN/1.73
EOSINOPHIL # BLD AUTO: 0.23 10*3/MM3 (ref 0–0.4)
EOSINOPHIL NFR BLD AUTO: 3.8 % (ref 0.3–6.2)
ERYTHROCYTE [DISTWIDTH] IN BLOOD BY AUTOMATED COUNT: 12.3 % (ref 12.3–15.4)
GLOBULIN UR ELPH-MCNC: 2.6 GM/DL
GLUCOSE SERPL-MCNC: 98 MG/DL (ref 65–99)
HCT VFR BLD AUTO: 42.8 % (ref 37.5–51)
HDLC SERPL-MCNC: 28 MG/DL (ref 40–60)
HGB BLD-MCNC: 14.7 G/DL (ref 13–17.7)
LDLC SERPL CALC-MCNC: 67 MG/DL (ref 0–100)
LDLC/HDLC SERPL: 1.99 {RATIO}
LYMPHOCYTES # BLD AUTO: 2.72 10*3/MM3 (ref 0.7–3.1)
LYMPHOCYTES NFR BLD AUTO: 45.5 % (ref 19.6–45.3)
MCH RBC QN AUTO: 32 PG (ref 26.6–33)
MCHC RBC AUTO-ENTMCNC: 34.3 G/DL (ref 31.5–35.7)
MCV RBC AUTO: 93.2 FL (ref 79–97)
MONOCYTES # BLD AUTO: 0.56 10*3/MM3 (ref 0.1–0.9)
MONOCYTES NFR BLD AUTO: 9.4 % (ref 5–12)
NEUTROPHILS NFR BLD AUTO: 2.4 10*3/MM3 (ref 1.7–7)
NEUTROPHILS NFR BLD AUTO: 40.1 % (ref 42.7–76)
PLATELET # BLD AUTO: 130 10*3/MM3 (ref 140–450)
PMV BLD AUTO: 9.2 FL (ref 6–12)
POTASSIUM SERPL-SCNC: 4.5 MMOL/L (ref 3.5–5.2)
PROT SERPL-MCNC: 6.7 G/DL (ref 6–8.5)
PSA SERPL-MCNC: 0.54 NG/ML (ref 0–4)
RBC # BLD AUTO: 4.59 10*6/MM3 (ref 4.14–5.8)
SODIUM SERPL-SCNC: 138 MMOL/L (ref 136–145)
TRIGL SERPL-MCNC: 292 MG/DL (ref 0–150)
TSH SERPL DL<=0.05 MIU/L-ACNC: 2.59 UIU/ML (ref 0.27–4.2)
VLDLC SERPL-MCNC: 47 MG/DL (ref 5–40)
WBC NRBC COR # BLD: 5.98 10*3/MM3 (ref 3.4–10.8)

## 2023-02-09 PROCEDURE — G0103 PSA SCREENING: HCPCS

## 2023-02-09 PROCEDURE — 80053 COMPREHEN METABOLIC PANEL: CPT

## 2023-02-09 PROCEDURE — 85025 COMPLETE CBC W/AUTO DIFF WBC: CPT

## 2023-02-09 PROCEDURE — 36415 COLL VENOUS BLD VENIPUNCTURE: CPT

## 2023-02-09 PROCEDURE — 84443 ASSAY THYROID STIM HORMONE: CPT

## 2023-02-09 PROCEDURE — 80061 LIPID PANEL: CPT

## 2023-04-17 RX ORDER — ALPRAZOLAM 0.25 MG/1
0.25 TABLET ORAL NIGHTLY PRN
Qty: 30 TABLET | Refills: 5 | Status: SHIPPED | OUTPATIENT
Start: 2023-04-17

## 2023-04-17 RX ORDER — ALPRAZOLAM 0.25 MG/1
0.25 TABLET ORAL NIGHTLY PRN
Qty: 30 TABLET | Refills: 5 | Status: CANCELLED | OUTPATIENT
Start: 2023-04-17

## 2023-06-08 RX ORDER — METOPROLOL SUCCINATE 25 MG/1
25 TABLET, EXTENDED RELEASE ORAL DAILY
Qty: 90 TABLET | Refills: 1 | Status: SHIPPED | OUTPATIENT
Start: 2023-06-08

## 2023-06-08 NOTE — TELEPHONE ENCOUNTER
Caller: Cm Flores NICHOLAS    Relationship: Self    Best call back number: [phone]    Requested Prescriptions:   Requested Prescriptions     Pending Prescriptions Disp Refills    metoprolol succinate XL (TOPROL-XL) 25 MG 24 hr tablet 90 tablet 1     Sig: Take 1 tablet by mouth Daily.        Pharmacy where request should be sent: [address] [phone]  - [phone] [facility]     Last office visit with prescribing clinician: 2/7/2023   Last telemedicine visit with prescribing clinician: Visit date not found   Next office visit with prescribing clinician: Visit date not found     Additional details provided by patient: PATIENT CURRENTLY HAS 2 PILLS LEFT. AFTER TOMORROW, HE WILL BE OUT.     Does the patient have less than a 3 day supply:  [x] Yes  [] No    Would you like a call back once the refill request has been completed: [] Yes [x] No    If the office needs to give you a call back, can they leave a voicemail: [] Yes [x] No    Francisco Solis Rep   06/08/23 10:34 EDT

## 2023-06-14 ENCOUNTER — TELEPHONE (OUTPATIENT)
Dept: FAMILY MEDICINE CLINIC | Facility: CLINIC | Age: 73
End: 2023-06-14

## 2023-06-14 NOTE — TELEPHONE ENCOUNTER
Caller: Cm Flores    Relationship to patient: Self    Best call back number: [phone]    Chief complaint: COUGHING, SWOLLEN LYMPH NODE, SINUS DRAINAGE    Type of visit: OFFICE/SAME     Requested date: ASAP MORNING     Additional notes:THE PATIENT WOULD LIKE A CALL BACK TO SCHEDULE WITH DR. HOWELL ONLY

## 2023-10-16 ENCOUNTER — TELEPHONE (OUTPATIENT)
Dept: FAMILY MEDICINE CLINIC | Facility: CLINIC | Age: 73
End: 2023-10-16
Payer: MEDICARE

## 2023-10-16 RX ORDER — ALPRAZOLAM 0.25 MG/1
0.25 TABLET ORAL NIGHTLY PRN
Qty: 30 TABLET | Refills: 5 | Status: SHIPPED | OUTPATIENT
Start: 2023-10-16

## 2023-10-16 RX ORDER — ALPRAZOLAM 0.25 MG/1
0.25 TABLET ORAL NIGHTLY PRN
Qty: 30 TABLET | Refills: 4 | OUTPATIENT
Start: 2023-10-16

## 2023-12-06 RX ORDER — METOPROLOL SUCCINATE 25 MG/1
25 TABLET, EXTENDED RELEASE ORAL DAILY
Qty: 90 TABLET | Refills: 1 | Status: SHIPPED | OUTPATIENT
Start: 2023-12-06

## 2023-12-06 NOTE — TELEPHONE ENCOUNTER
He is [employer]. Is he someone we are losing. If not get him on my schedule before year end for a medicare annual wellness if due. If not then get him one when it is due in the next 4 months.

## 2024-01-23 ENCOUNTER — OFFICE VISIT (OUTPATIENT)
Dept: FAMILY MEDICINE CLINIC | Facility: CLINIC | Age: 74
End: 2024-01-23
Payer: MEDICARE

## 2024-01-23 VITALS
TEMPERATURE: 98.2 F | HEIGHT: 71 IN | DIASTOLIC BLOOD PRESSURE: 75 MMHG | OXYGEN SATURATION: 95 % | RESPIRATION RATE: 18 BRPM | WEIGHT: 129.6 LBS | SYSTOLIC BLOOD PRESSURE: 115 MMHG | BODY MASS INDEX: 18.15 KG/M2 | HEART RATE: 70 BPM

## 2024-01-23 DIAGNOSIS — E78.2 MIXED HYPERLIPIDEMIA: ICD-10-CM

## 2024-01-23 DIAGNOSIS — J43.2 CENTRILOBULAR EMPHYSEMA: ICD-10-CM

## 2024-01-23 DIAGNOSIS — I10 ESSENTIAL HYPERTENSION: ICD-10-CM

## 2024-01-23 DIAGNOSIS — J30.2 SEASONAL ALLERGIES: ICD-10-CM

## 2024-01-23 DIAGNOSIS — F41.1 GAD (GENERALIZED ANXIETY DISORDER): Chronic | ICD-10-CM

## 2024-01-23 DIAGNOSIS — Z00.00 MEDICARE ANNUAL WELLNESS VISIT, SUBSEQUENT: Primary | ICD-10-CM

## 2024-01-23 DIAGNOSIS — J44.9 CHRONIC OBSTRUCTIVE PULMONARY DISEASE, UNSPECIFIED COPD TYPE: ICD-10-CM

## 2024-01-23 RX ORDER — BUDESONIDE, GLYCOPYRROLATE, AND FORMOTEROL FUMARATE 160; 9; 4.8 UG/1; UG/1; UG/1
2 AEROSOL, METERED RESPIRATORY (INHALATION) 2 TIMES DAILY
Qty: 1 EACH | Refills: 11 | Status: SHIPPED | OUTPATIENT
Start: 2024-01-23

## 2024-01-23 RX ORDER — CETIRIZINE HYDROCHLORIDE 10 MG/1
10 TABLET ORAL DAILY
Qty: 30 TABLET | Refills: 11 | Status: SHIPPED | OUTPATIENT
Start: 2024-01-23

## 2024-01-23 RX ORDER — ROSUVASTATIN CALCIUM 5 MG/1
5 TABLET, COATED ORAL DAILY
Qty: 90 TABLET | Refills: 1 | Status: SHIPPED | OUTPATIENT
Start: 2024-01-23

## 2024-01-23 NOTE — ASSESSMENT & PLAN NOTE
The patient was educated about seasonal allergies.  He was started on Zyrtec today to be taken as directed.  Will see him back at his next clinic appointment manage according to findings.

## 2024-01-23 NOTE — PROGRESS NOTES
The ABCs of the Annual Wellness Visit  Subsequent Medicare Wellness Visit    Subjective    Cm Flores is a 73 y.o. male who presents for a Subsequent Medicare Wellness Visit.    The following portions of the patient's history were reviewed and   updated as appropriate: allergies, current medications, past family history, past medical history, past social history, past surgical history, and problem list.    Compared to one year ago, the patient feels his physical   health is the same.    Compared to one year ago, the patient feels his mental   health is the same.    Recent Hospitalizations:  He was not admitted to the hospital during the last year.       Current Medical Providers:  Patient Care Team:  Nancy Yeh DO as PCP - General (Family Medicine)    Outpatient Medications Prior to Visit   Medication Sig Dispense Refill    albuterol sulfate  (90 Base) MCG/ACT inhaler Inhale 2 puffs Every 4 (Four) Hours As Needed for Wheezing. 1 g 5    ALPRAZolam (XANAX) 0.25 MG tablet Take 1 tablet by mouth At Night As Needed for Anxiety. 30 tablet 5    ASPIRIN 81 PO Take  by mouth.      ipratropium-albuterol (DUO-NEB) 0.5-2.5 mg/3 ml nebulizer Take 3 mL by nebulization Every 4 (Four) Hours As Needed for Wheezing for up to 30 days. Indications: Chronic Obstructive Lung Disease 360 mL 11    metoprolol succinate XL (TOPROL-XL) 25 MG 24 hr tablet Take 1 tablet by mouth Daily. 90 tablet 1    pantoprazole (PROTONIX) 40 MG EC tablet Take 1 tablet by mouth Daily.      Budeson-Glycopyrrol-Formoterol (Breztri Aerosphere) 160-9-4.8 MCG/ACT aerosol inhaler Inhale 2 puffs 2 (Two) Times a Day. 1 each 11    rosuvastatin (CRESTOR) 5 MG tablet Take 1 tablet by mouth Daily. 90 tablet 1     No facility-administered medications prior to visit.       No opioid medication identified on active medication list. I have reviewed chart for other potential  high risk medication/s and harmful drug interactions in the elderly.        Aspirin is  "on active medication list. Aspirin use is indicated based on review of current medical condition/s. Pros and cons of this therapy have been discussed today. Benefits of this medication outweigh potential harm.  Patient has been encouraged to continue taking this medication.  .      Patient Active Problem List   Diagnosis    History of bladder cancer    Essential hypertension    Mixed hyperlipidemia    Primary insomnia    History of smoking    Periprosthetic fracture around internal prosthetic left hip joint    Myocardial infarction    Arteriosclerosis of coronary artery    Congestive heart failure    Thrombocytopenia    COPD, moderate    Generalized abdominal pain    ADAMA (generalized anxiety disorder)    Seasonal allergies    Body mass index (BMI) of 19 or less in adult     Advance Care Planning   Advance Care Planning     Advance Directive is not on file.  ACP discussion was held with the patient during this visit. Patient has an advance directive (not in EMR), copy requested.     Objective    Vitals:    24 1440   BP: 115/75   BP Location: Left arm   Patient Position: Sitting   Cuff Size: Adult   Pulse: 70   Resp: 18   Temp: 98.2 °F (36.8 °C)   TempSrc: Tympanic   SpO2: 95%   Weight: 58.8 kg (129 lb 9.6 oz)   Height: 180.3 cm (70.98\")   PainSc: 0-No pain     Estimated body mass index is 18.08 kg/m² as calculated from the following:    Height as of this encounter: 180.3 cm (70.98\").    Weight as of this encounter: 58.8 kg (129 lb 9.6 oz).    BMI is within normal parameters. No other follow-up for BMI required.      Does the patient have evidence of cognitive impairment? No          HEALTH RISK ASSESSMENT    Smoking Status:  Social History     Tobacco Use   Smoking Status Former    Packs/day: 1.00    Years: 50.00    Additional pack years: 0.00    Total pack years: 50.00    Types: Cigarettes    Start date: 1972    Quit date: 10/1/2021    Years since quittin.3   Smokeless Tobacco Never   Tobacco Comments "    smokes a average of 1ppw, no second hand smoke exposure now .     Alcohol Consumption:  Social History     Substance and Sexual Activity   Alcohol Use Never     Fall Risk Screen:    STEADI Fall Risk Assessment was completed, and patient is at LOW risk for falls.Assessment completed on:2024    Depression Screenin/23/2024     2:00 PM   PHQ-2/PHQ-9 Depression Screening   Little Interest or Pleasure in Doing Things 0-->not at all   Feeling Down, Depressed or Hopeless 3-->nearly every day   Trouble Falling or Staying Asleep, or Sleeping Too Much 0-->not at all   Feeling Tired or Having Little Energy 0-->not at all   Poor Appetite or Overeating 0-->not at all   Feeling Bad about Yourself - or that You are a Failure or Have Let Yourself or Your Family Down 0-->not at all   Trouble Concentrating on Things, Such as Reading the Newspaper or Watching Television 0-->not at all   Moving or Speaking So Slowly that Other People Could Have Noticed? Or the Opposite - Being So Fidgety 0-->not at all   Thoughts that You Would be Better Off Dead or of Hurting Yourself in Some Way 0-->not at all   PHQ-9: Brief Depression Severity Measure Score 3   If You Checked Off Any Problems, How Difficult Have These Problems Made It For You to Do Your Work, Take Care of Things at Home, or Get Along with Other People? not difficult at all       Health Habits and Functional and Cognitive Screenin/23/2024     2:00 PM   Functional & Cognitive Status   Do you have difficulty preparing food and eating? No   Do you have difficulty bathing yourself, getting dressed or grooming yourself? No   Do you have difficulty using the toilet? No   Do you have difficulty moving around from place to place? No   Do you have trouble with steps or getting out of a bed or a chair? No   Current Diet Well Balanced Diet   Dental Exam Not up to date   Eye Exam Up to date   Exercise (times per week) 0 times per week   Current Exercises Include No  Regular Exercise   Do you need help using the phone?  No   Are you deaf or do you have serious difficulty hearing?  No   Do you need help to go to places out of walking distance? No   Do you need help shopping? No   Do you need help preparing meals?  No   Do you need help with housework?  No   Do you need help with laundry? No   Do you need help taking your medications? No   Do you need help managing money? No   Do you ever drive or ride in a car without wearing a seat belt? No   Have you felt unusual stress, anger or loneliness in the last month? No   Who do you live with? Alone   If you need help, do you have trouble finding someone available to you? No   Have you been bothered in the last four weeks by sexual problems? No   Do you have difficulty concentrating, remembering or making decisions? No       Age-appropriate Screening Schedule:  Refer to the list below for future screening recommendations based on patient's age, sex and/or medical conditions. Orders for these recommended tests are listed in the plan section. The patient has been provided with a written plan.    Health Maintenance   Topic Date Due    BMI FOLLOWUP  03/14/2023    ZOSTER VACCINE (2 of 3) 01/23/2024 (Originally 2/12/2015)    COLORECTAL CANCER SCREENING  01/23/2024 (Originally 1950)    COVID-19 Vaccine (1) 01/25/2024 (Originally 6/13/1951)    TDAP/TD VACCINES (1 - Tdap) 02/07/2024 (Originally [birth year]/1970)    INFLUENZA VACCINE  03/31/2024 (Originally 8/1/2023)    Pneumococcal Vaccine 65+ (1 of 2 - PCV) 01/23/2025 (Originally 12/13/1956)    LUNG CANCER SCREENING  01/23/2025 (Originally 10/27/2023)    LIPID PANEL  02/09/2024    ANNUAL WELLNESS VISIT  01/23/2025    HEPATITIS C SCREENING  Completed                  CMS Preventative Services Quick Reference  Risk Factors Identified During Encounter  Tobacco Use/Dependance Risk (use dotphrase .tobaccocessation for documentation)  The above risks/problems have been discussed with the  "patient.  Pertinent information has been shared with the patient in the After Visit Summary.  An After Visit Summary and PPPS were made available to the patient.    Follow Up:   Next Medicare Wellness visit to be scheduled in 1 year.       Additional E&M Note during same encounter follows:  Patient has multiple medical problems which are significant and separately identifiable that require additional work above and beyond the Medicare Wellness Visit.      Chief Complaint  Medicare Wellness-subsequent and Nasal Congestion    Subjective        HPI  Cm Flores is also being seen today for seasonal allergies and emphysema.     Review of Systems   HENT:  Positive for sinus pressure. Negative for trouble swallowing.    Eyes:  Negative for visual disturbance.   Respiratory:  Negative for apnea.    Cardiovascular:  Negative for chest pain.   Gastrointestinal:  Negative for blood in stool.   Endocrine: Negative for polyphagia.   Genitourinary:  Negative for dysuria.   Skin:  Negative for color change.   Allergic/Immunologic: Negative for immunocompromised state.   Neurological:  Negative for seizures.   Hematological:  Negative for adenopathy.   Psychiatric/Behavioral:  Negative for behavioral problems.        Objective   Vital Signs:  /75 (BP Location: Left arm, Patient Position: Sitting, Cuff Size: Adult)   Pulse 70   Temp 98.2 °F (36.8 °C) (Tympanic)   Resp 18   Ht 180.3 cm (70.98\")   Wt 58.8 kg (129 lb 9.6 oz)   SpO2 95%   BMI 18.08 kg/m²     Physical Exam  Vitals reviewed.   Constitutional:       Appearance: He is well-developed and underweight.   HENT:      Head: Normocephalic and atraumatic.      Right Ear: External ear normal.      Left Ear: External ear normal.      Mouth/Throat:      Pharynx: No oropharyngeal exudate.   Eyes:      Conjunctiva/sclera: Conjunctivae normal.      Pupils: Pupils are equal, round, and reactive to light.   Neck:      Vascular: No carotid bruit.   Cardiovascular:      Rate " and Rhythm: Normal rate and regular rhythm.      Heart sounds: No murmur heard.     No friction rub. No gallop.   Pulmonary:      Effort: Pulmonary effort is normal.      Breath sounds: Normal breath sounds. No wheezing or rhonchi.   Abdominal:      General: There is no distension.   Skin:     General: Skin is warm and dry.   Neurological:      Mental Status: He is alert and oriented to person, place, and time.      Cranial Nerves: No cranial nerve deficit.      Motor: No weakness.   Psychiatric:         Mood and Affect: Mood and affect normal.         Behavior: Behavior normal.         Thought Content: Thought content normal.         Judgment: Judgment normal.            CMP          2/9/2023    08:30   CMP   Glucose 98    BUN 12    Creatinine 1.60    EGFR 45.8    Sodium 138    Potassium 4.5    Chloride 100    Calcium 9.4    Total Protein 6.7    Albumin 4.1    Globulin 2.6    Total Bilirubin 0.7    Alkaline Phosphatase 55    AST (SGOT) 16    ALT (SGPT) 5    Albumin/Globulin Ratio 1.6    BUN/Creatinine Ratio 7.5    Anion Gap 7.0      CBC          2/9/2023    08:30   CBC   WBC 5.98    RBC 4.59    Hemoglobin 14.7    Hematocrit 42.8    MCV 93.2    MCH 32.0    MCHC 34.3    RDW 12.3    Platelets 130      Lipid Panel          2/9/2023    08:30   Lipid Panel   Total Cholesterol 142    Triglycerides 292    HDL Cholesterol 28    VLDL Cholesterol 47    LDL Cholesterol  67    LDL/HDL Ratio 1.99      TSH          2/9/2023    08:30   TSH   TSH 2.590                 Assessment and Plan   Diagnoses and all orders for this visit:    1. Medicare annual wellness visit, subsequent (Primary)    2. Seasonal allergies  Assessment & Plan:  The patient was educated about seasonal allergies.  He was started on Zyrtec today to be taken as directed.  Will see him back at his next clinic appointment manage according to findings.  -     cetirizine (zyrTEC) 10 MG tablet; Take 1 tablet by mouth Daily.  Dispense: 30 tablet; Refill: 11    3.  Centrilobular emphysema  -     Budeson-Glycopyrrol-Formoterol (Breztri Aerosphere) 160-9-4.8 MCG/ACT aerosol inhaler; Inhale 2 puffs 2 (Two) Times a Day.  Dispense: 1 each; Refill: 11    4. Chronic obstructive pulmonary disease, unspecified COPD type  -     Budeson-Glycopyrrol-Formoterol (Breztri Aerosphere) 160-9-4.8 MCG/ACT aerosol inhaler; Inhale 2 puffs 2 (Two) Times a Day.  Dispense: 1 each; Refill: 11    5. Essential hypertension  Assessment & Plan:  Hypertension is improving with treatment.  Continue current treatment regimen.  Dietary sodium restriction.  Weight loss.  Blood pressure will be reassessed at the next regular appointment.      6. Mixed hyperlipidemia  -     rosuvastatin (CRESTOR) 5 MG tablet; Take 1 tablet by mouth Daily.  Dispense: 90 tablet; Refill: 1    7. ADAMA (generalized anxiety disorder)   Assessment & Plan:  Psychological condition is improving with treatment.  Continue current treatment regimen.  Psychological condition  will be reassessed at the next regular appointment.      8.  Body mass index of 19 or less in adult             -   The patient was encouraged to increase his caloric intake and try to gain weight.      Follow Up   Return in about 6 months (around 7/23/2024).  Patient was given instructions and counseling regarding his condition or for health maintenance advice. Please see specific information pulled into the AVS if appropriate.

## 2024-01-24 ENCOUNTER — PATIENT ROUNDING (BHMG ONLY) (OUTPATIENT)
Dept: FAMILY MEDICINE CLINIC | Facility: CLINIC | Age: 74
End: 2024-01-24
Payer: MEDICARE

## 2024-01-24 NOTE — PROGRESS NOTES
A My-Chart message has been sent to the patient for PATIENT ROUNDING with [facility].

## 2024-04-12 RX ORDER — ALPRAZOLAM 0.25 MG/1
0.25 TABLET ORAL NIGHTLY PRN
Qty: 30 TABLET | Refills: 5 | Status: SHIPPED | OUTPATIENT
Start: 2024-04-12

## 2024-04-12 NOTE — TELEPHONE ENCOUNTER
Caller: MarkCm    Relationship: Self    Best call back number: [phone]     Requested Prescriptions:   Requested Prescriptions     Pending Prescriptions Disp Refills    ALPRAZolam (XANAX) 0.25 MG tablet 30 tablet 5     Sig: Take 1 tablet by mouth At Night As Needed for Anxiety.        Pharmacy where request should be sent: [address] [phone] [facility] [phone] [facility]     Last office visit with prescribing clinician: 1/23/2024   Last telemedicine visit with prescribing clinician: Visit date not found   Next office visit with prescribing clinician: 7/23/2024     Additional details provided by patient: PATIENT ONLY HAS TONIGHT LEFT OF MEDICATION    Does the patient have less than a 3 day supply:  [x] Yes  [] No      Francisco Ashley Rep   04/12/24 09:45 EDT

## 2024-06-04 RX ORDER — METOPROLOL SUCCINATE 25 MG/1
25 TABLET, EXTENDED RELEASE ORAL DAILY
Qty: 90 TABLET | Refills: 1 | Status: SHIPPED | OUTPATIENT
Start: 2024-06-04

## 2024-06-04 NOTE — TELEPHONE ENCOUNTER
TOPROL XL 25MG ER ONE A DAY, HE IS OUT OF REFILLS IF WE COULD UPDATE THE RX AND SEND NEXT DOOR TO [facility].

## 2024-06-25 ENCOUNTER — OFFICE VISIT (OUTPATIENT)
Dept: FAMILY MEDICINE CLINIC | Facility: CLINIC | Age: 74
End: 2024-06-25
Payer: MEDICARE

## 2024-06-25 VITALS
TEMPERATURE: 97.8 F | OXYGEN SATURATION: 92 % | DIASTOLIC BLOOD PRESSURE: 72 MMHG | HEART RATE: 81 BPM | BODY MASS INDEX: 18.48 KG/M2 | WEIGHT: 132 LBS | RESPIRATION RATE: 18 BRPM | HEIGHT: 71 IN | SYSTOLIC BLOOD PRESSURE: 155 MMHG

## 2024-06-25 DIAGNOSIS — J44.9 COPD, MODERATE: Chronic | ICD-10-CM

## 2024-06-25 DIAGNOSIS — F41.1 GAD (GENERALIZED ANXIETY DISORDER): Chronic | ICD-10-CM

## 2024-06-25 DIAGNOSIS — R06.00 DYSPNEA, UNSPECIFIED TYPE: ICD-10-CM

## 2024-06-25 DIAGNOSIS — I10 ESSENTIAL HYPERTENSION: Chronic | ICD-10-CM

## 2024-06-25 DIAGNOSIS — J18.9 PNEUMONIA OF RIGHT LOWER LOBE DUE TO INFECTIOUS ORGANISM: Primary | ICD-10-CM

## 2024-06-25 PROCEDURE — 1126F AMNT PAIN NOTED NONE PRSNT: CPT | Performed by: NURSE PRACTITIONER

## 2024-06-25 PROCEDURE — 1159F MED LIST DOCD IN RCRD: CPT | Performed by: NURSE PRACTITIONER

## 2024-06-25 PROCEDURE — 99214 OFFICE O/P EST MOD 30 MIN: CPT | Performed by: NURSE PRACTITIONER

## 2024-06-25 PROCEDURE — 1160F RVW MEDS BY RX/DR IN RCRD: CPT | Performed by: NURSE PRACTITIONER

## 2024-06-25 PROCEDURE — 3078F DIAST BP <80 MM HG: CPT | Performed by: NURSE PRACTITIONER

## 2024-06-25 PROCEDURE — 3077F SYST BP >= 140 MM HG: CPT | Performed by: NURSE PRACTITIONER

## 2024-06-25 RX ORDER — ALBUTEROL SULFATE 90 UG/1
2 AEROSOL, METERED RESPIRATORY (INHALATION) EVERY 4 HOURS PRN
Qty: 1 G | Refills: 5 | Status: SHIPPED | OUTPATIENT
Start: 2024-06-25

## 2024-06-25 NOTE — ASSESSMENT & PLAN NOTE
Psychological condition is improving with treatment.  Continue current treatment regimen.  Regular aerobic exercise.  Psychological condition  will be reassessed at the next regular appointment.   Dawit reviewed and appropriate

## 2024-06-25 NOTE — ASSESSMENT & PLAN NOTE
Improving   Continue medication as prescribed   Proceed to ED if s/s worsen or become severe   All questions answered, pt verbalizes understanding, and agrees to POC

## 2024-06-25 NOTE — PROGRESS NOTES
Exercise Oximetry    Patient Name:Cm Flroes   MRN: [id number]   Date: 06/25/24             ROOM AIR BASELINE   SpO2% 95   Heart Rate 90   Blood Pressure 155/72     EXERCISE ON ROOM AIR SpO2% EXERCISE ON O2 @ 2 LPM SpO2%   1 MINUTE 88 1 MINUTE 95   2 MINUTES 86 2 MINUTES 97   3 MINUTES na 3 MINUTES na   4 MINUTES na 4 MINUTES Na     5 MINUTES na 5 MINUTES na   6 MINUTES na 6 MINUTES na              Distance Walked  96 feet Distance Walked   Dyspnea (Javon Scale)  yes Dyspnea (Javon Scale)   Fatigue (Javon Scale)  yes Fatigue (Javon Scale)   SpO2% Post Exercise  92 SpO2% Post Exercise   HR Post Exercise  86 HR Post Exercise   Time to Recovery  1 min Time to Recovery     Comments: Patient on room air 95 %, started walking after a min at 88 % after two minutes 86% stopped test applied oxygen at 2 L after one minute patient was back to 95 percent and heart rate at 86.

## 2024-06-25 NOTE — PROGRESS NOTES
"Chief Complaint  Follow-up (Pt went to Urgent Care yesterday, diagnosed with right lower lung pneumonia and prescribed cefdinir, doxycycline, prednisone and 1.25mg nebulizer. Pt is wanting home oxygen tank ordered for bedtime.) and Med Refill (Albuterol inhaler)    Subjective        Cm Flores presents to [facility]  History of Present Illness  Pt presents [facility] 6/24/24. Pt dx with RLL pneumonia. Pt states he is feeling better, states he \"slept all night for the first time in a week\". Taking all medications as prescribed. Pt with COPD. Needs refill of albuterol inhaler. Will perform 6 minute walk test to evaluate for possible home oxygen.     Reviewed all recent labs and medications.      Objective   Vital Signs:  /72   Pulse 81   Temp 97.8 °F (36.6 °C) (Temporal)   Resp 18   Ht 180.3 cm (71\")   Wt 59.9 kg (132 lb)   SpO2 92%   BMI 18.41 kg/m²   Estimated body mass index is 18.41 kg/m² as calculated from the following:    Height as of this encounter: 180.3 cm (71\").    Weight as of this encounter: 59.9 kg (132 lb).       BMI is below normal parameters (malnutrition). Recommendations: treating the underlying disease process      Physical Exam  Vitals reviewed.   Constitutional:       General: He is not in acute distress.     Appearance: He is ill-appearing.   HENT:      Head: Normocephalic.      Right Ear: Tympanic membrane normal.      Left Ear: Tympanic membrane normal.      Nose: Nose normal.      Mouth/Throat:      Pharynx: Oropharynx is clear. No posterior oropharyngeal erythema.   Eyes:      General: No scleral icterus.     Extraocular Movements: Extraocular movements intact.      Conjunctiva/sclera: Conjunctivae normal.      Pupils: Pupils are equal, round, and reactive to light.   Cardiovascular:      Rate and Rhythm: Normal rate and regular rhythm.      Pulses: Normal pulses.      Heart sounds: Normal heart sounds.   Pulmonary:      Effort: Pulmonary effort " is normal.      Breath sounds: Examination of the right-lower field reveals decreased breath sounds. Decreased breath sounds present.   Abdominal:      General: Bowel sounds are normal.      Palpations: Abdomen is soft.   Musculoskeletal:         General: Normal range of motion.      Cervical back: Neck supple.   Skin:     General: Skin is warm and dry.   Neurological:      Mental Status: He is alert and oriented to person, place, and time.   Psychiatric:         Mood and Affect: Mood normal.         Behavior: Behavior normal.         Thought Content: Thought content normal.         Judgment: Judgment normal.      Exercise Oximetry     Patient Name:Cm Flores   MRN: [id number]   Date: 06/25/24                                                                                                     ROOM AIR BASELINE   SpO2% 95   Heart Rate 90   Blood Pressure 155/72      EXERCISE ON ROOM AIR SpO2% EXERCISE ON O2 @ 2 LPM SpO2%   1 MINUTE 88 1 MINUTE 95   2 MINUTES 86 2 MINUTES 97   3 MINUTES na 3 MINUTES na   4 MINUTES na 4 MINUTES Na      5 MINUTES na 5 MINUTES na   6 MINUTES na 6 MINUTES na                                                                                                                 Distance Walked  96 feet Distance Walked   Dyspnea (Javon Scale)  yes Dyspnea (Javon Scale)   Fatigue (Javon Scale)  yes Fatigue (Javon Scale)   SpO2% Post Exercise  92 SpO2% Post Exercise   HR Post Exercise  86 HR Post Exercise   Time to Recovery  1 min Time to Recovery      Comments: Patient on room air 95 %, started walking after a min at 88 % after two minutes 86% stopped test applied oxygen at 2 L after one minute patient was back to 95 percent and heart rate at 86.  Result Review :    The following data was reviewed by: PRADIP English on 06/25/2024:    Data reviewed : Radiologic studies CXR and Consultant notes UC 6/24                 Assessment and Plan     Diagnoses and all orders for this visit:    1.  Pneumonia of right lower lobe due to infectious organism (Primary)  Assessment & Plan:  Improving   Continue medication as prescribed   Proceed to ED if s/s worsen or become severe   All questions answered, pt verbalizes understanding, and agrees to POC        2. Essential hypertension  Assessment & Plan:  Hypertension is improving with treatment.  Continue current treatment regimen.  Dietary sodium restriction.  Weight loss.  Regular aerobic exercise.  Blood pressure will be reassessed at the next regular appointment.        3. Dyspnea, unspecified type  Assessment & Plan:  Failed 6 minute walk test   Order for home O2 prn nasal cannula 2L    Orders:  -     Oxygen Therapy    4. COPD, moderate  -     Oxygen Therapy    5. ADAMA (generalized anxiety disorder)  Assessment & Plan:  Psychological condition is improving with treatment.  Continue current treatment regimen.  Regular aerobic exercise.  Psychological condition  will be reassessed at the next regular appointment.   Dawit reviewed and appropriate       6. Body mass index (BMI) of 19 or less in adult  Assessment & Plan:  Stable   Continue to monitor   Recommend boost shakes   Frequent meals       Other orders  -     albuterol sulfate  (90 Base) MCG/ACT inhaler; Inhale 2 puffs Every 4 (Four) Hours As Needed for Wheezing.  Dispense: 1 g; Refill: 5             Follow Up     Return if symptoms worsen or fail to improve.  Patient was given instructions and counseling regarding his condition or for health maintenance advice. Please see specific information pulled into the AVS if appropriate.

## 2024-06-25 NOTE — PROGRESS NOTES
Exercise Oximetry    Patient Name:Cm Flores   MRN: [id number]   Date: 06/25/24             ROOM AIR BASELINE   SpO2% 95   Heart Rate 90   Blood Pressure 155/72     EXERCISE ON ROOM AIR SpO2% EXERCISE ON O2 @ 2 LPM SpO2%   1 MINUTE 88 1 MINUTE 95   2 MINUTES 86 2 MINUTES 97   3 MINUTES na 3 MINUTES na   4 MINUTES na 4 MINUTES Na     5 MINUTES na 5 MINUTES na   6 MINUTES na 6 MINUTES na              Distance Walked  96 feet Distance Walked   Dyspnea (Javon Scale)  yes Dyspnea (Javon Scale)   Fatigue (Javon Scale)  yes Fatigue (Javon Scale)   SpO2% Post Exercise  92 SpO2% Post Exercise   HR Post Exercise  86 HR Post Exercise   Time to Recovery  1 min Time to Recovery     Comments: Patient on room air 95 %, started walking after a min at 88 % after two minutes 86% stopped test applied oxygen at 2 L after one minute patient was back to 95 percent and heart rate at 86.

## 2024-06-26 ENCOUNTER — PATIENT ROUNDING (BHMG ONLY) (OUTPATIENT)
Dept: FAMILY MEDICINE CLINIC | Facility: CLINIC | Age: 74
End: 2024-06-26
Payer: MEDICARE

## 2024-06-26 NOTE — PROGRESS NOTES
A My-Chart message has been sent to the patient for [place] [facility]

## 2024-07-23 ENCOUNTER — OFFICE VISIT (OUTPATIENT)
Dept: FAMILY MEDICINE CLINIC | Facility: CLINIC | Age: 74
End: 2024-07-23
Payer: MEDICARE

## 2024-07-23 VITALS
RESPIRATION RATE: 20 BRPM | WEIGHT: 126 LBS | SYSTOLIC BLOOD PRESSURE: 118 MMHG | TEMPERATURE: 97.7 F | DIASTOLIC BLOOD PRESSURE: 52 MMHG | HEIGHT: 71 IN | BODY MASS INDEX: 17.64 KG/M2 | HEART RATE: 77 BPM | OXYGEN SATURATION: 92 %

## 2024-07-23 DIAGNOSIS — E78.2 MIXED HYPERLIPIDEMIA: ICD-10-CM

## 2024-07-23 DIAGNOSIS — Z12.5 SCREENING FOR PROSTATE CANCER: ICD-10-CM

## 2024-07-23 DIAGNOSIS — Z51.81 MEDICATION MONITORING ENCOUNTER: ICD-10-CM

## 2024-07-23 DIAGNOSIS — N18.31 STAGE 3A CHRONIC KIDNEY DISEASE: ICD-10-CM

## 2024-07-23 DIAGNOSIS — I10 ESSENTIAL HYPERTENSION: Primary | Chronic | ICD-10-CM

## 2024-07-23 DIAGNOSIS — F41.1 GAD (GENERALIZED ANXIETY DISORDER): Chronic | ICD-10-CM

## 2024-07-23 DIAGNOSIS — Z12.11 SCREENING FOR COLON CANCER: ICD-10-CM

## 2024-07-23 DIAGNOSIS — Z00.00 ANNUAL PHYSICAL EXAM: ICD-10-CM

## 2024-07-23 LAB
AMPHET+METHAMPHET UR QL: NEGATIVE
AMPHETAMINE INTERNAL CONTROL: ABNORMAL
AMPHETAMINES UR QL: NEGATIVE
BARBITURATE INTERNAL CONTROL: ABNORMAL
BARBITURATES UR QL SCN: NEGATIVE
BENZODIAZ UR QL SCN: POSITIVE
BENZODIAZEPINE INTERNAL CONTROL: ABNORMAL
BUPRENORPHINE INTERNAL CONTROL: ABNORMAL
BUPRENORPHINE SERPL-MCNC: NEGATIVE NG/ML
CANNABINOIDS SERPL QL: NEGATIVE
COCAINE INTERNAL CONTROL: ABNORMAL
COCAINE UR QL: NEGATIVE
EXPIRATION DATE: ABNORMAL
Lab: ABNORMAL
MDMA (ECSTASY) INTERNAL CONTROL: ABNORMAL
MDMA UR QL SCN: NEGATIVE
METHADONE INTERNAL CONTROL: ABNORMAL
METHADONE UR QL SCN: NEGATIVE
METHAMPHETAMINE INTERNAL CONTROL: ABNORMAL
MORPHINE INTERNAL CONTROL: ABNORMAL
MORPHINE/OPIATES SCREEN, URINE: NEGATIVE
OXYCODONE INTERNAL CONTROL: ABNORMAL
OXYCODONE UR QL SCN: NEGATIVE
PCP UR QL SCN: NEGATIVE
PHENCYCLIDINE INTERNAL CONTROL: ABNORMAL
THC INTERNAL CONTROL: ABNORMAL

## 2024-07-23 PROCEDURE — 3078F DIAST BP <80 MM HG: CPT | Performed by: FAMILY MEDICINE

## 2024-07-23 PROCEDURE — 1159F MED LIST DOCD IN RCRD: CPT | Performed by: FAMILY MEDICINE

## 2024-07-23 PROCEDURE — 99214 OFFICE O/P EST MOD 30 MIN: CPT | Performed by: FAMILY MEDICINE

## 2024-07-23 PROCEDURE — 1160F RVW MEDS BY RX/DR IN RCRD: CPT | Performed by: FAMILY MEDICINE

## 2024-07-23 PROCEDURE — 1126F AMNT PAIN NOTED NONE PRSNT: CPT | Performed by: FAMILY MEDICINE

## 2024-07-23 PROCEDURE — G2211 COMPLEX E/M VISIT ADD ON: HCPCS | Performed by: FAMILY MEDICINE

## 2024-07-23 PROCEDURE — 3074F SYST BP LT 130 MM HG: CPT | Performed by: FAMILY MEDICINE

## 2024-07-23 NOTE — PROGRESS NOTES
"Chief Complaint  Follow-up (6 month follow up on COPD,Emphysema.)    Subjective        Cm Flores presents to [facility]  History of Present Illness  He is here today for follow-up for the management of his chronic medical condition.  He does not have any children.  He has a history of kidney stones.  He has coronary artery disease, peripheral vascular disease, tobacco abuse, history of AAA repair in 2016 and bladder cancer in 2008.     He has a 50 pack year smoking habit and he stopped smoking last year.  He was offered a low-dose CT scan today but refused.     He has been managed by hematology for thrombocytopenia.     He has lost 6 lbs since his last visit.    He has had pneumonia in the right lung since his last visit.  He is doing better today.  He has home oxygen but refuses to wear it.     The patient has no complaints today and denies chest pain, shortness of breath, weakness, numbness, nausea, vomiting, diarrhea, dizziness or syncopal event        Objective   Vital Signs:  /52   Pulse 77   Temp 97.7 °F (36.5 °C) (Temporal)   Resp 20   Ht 180.3 cm (71\")   Wt 57.2 kg (126 lb)   SpO2 92%   BMI 17.57 kg/m²   Estimated body mass index is 17.57 kg/m² as calculated from the following:    Height as of this encounter: 180.3 cm (71\").    Weight as of this encounter: 57.2 kg (126 lb).               Physical Exam  Vitals reviewed.   Constitutional:       Appearance: He is well-developed and underweight.   HENT:      Head: Normocephalic and atraumatic.      Right Ear: External ear normal.      Left Ear: External ear normal.      Mouth/Throat:      Pharynx: No oropharyngeal exudate.   Eyes:      Conjunctiva/sclera: Conjunctivae normal.      Pupils: Pupils are equal, round, and reactive to light.   Neck:      Vascular: No carotid bruit.   Cardiovascular:      Rate and Rhythm: Normal rate and regular rhythm.      Heart sounds: No murmur heard.     No friction rub. No " gallop.   Pulmonary:      Effort: Pulmonary effort is normal.      Breath sounds: Normal breath sounds. No wheezing or rhonchi.   Abdominal:      General: There is no distension.   Skin:     General: Skin is warm and dry.   Neurological:      Mental Status: He is alert and oriented to person, place, and time.      Cranial Nerves: No cranial nerve deficit.      Motor: No weakness.   Psychiatric:         Mood and Affect: Mood and affect normal.         Behavior: Behavior normal.         Thought Content: Thought content normal.         Judgment: Judgment normal.        Result Review :                           Assessment and Plan     Diagnoses and all orders for this visit:    1. Essential hypertension (Primary)  Assessment & Plan:  Hypertension is stable and controlled  Continue current treatment regimen.  Dietary sodium restriction.  Blood pressure will be reassessed in 6 months.      2. ADAMA (generalized anxiety disorder)  Assessment & Plan:  The patient's anxiety is currently well-controlled with Xanax quarter milligrams nightly.  UDS, controlled medication contract and Dawit in the chart for review.  The patient's abuse risk is very low.      3. Body mass index (BMI) of 19 or less in adult  Assessment & Plan:  The patient was educated about the need to gain weight.  He says that he has lost weight because he was sick with pneumonia.  He is gaining weight since getting over pneumonia.  Increased caloric intake foods were encouraged.      4. Mixed hyperlipidemia  -     Lipid Panel; Future    5. Medication monitoring encounter  -     POC Medline 12 Panel Urine Drug Screen    6. Screening for prostate cancer  -     PSA Screen; Future    7. Annual physical exam  -     TSH; Future  -     Comprehensive Metabolic Panel; Future  -     CBC & Differential; Future    8. Screening for colon cancer  -     Ambulatory Referral For Screening Colonoscopy    9. Stage 3a chronic kidney disease  -     Comprehensive Metabolic Panel;  Future  -     Urinalysis With Microscopic - Urine, Clean Catch; Future             Follow Up     Return in about 6 months (around 1/23/2025).  Patient was given instructions and counseling regarding his condition or for health maintenance advice. Please see specific information pulled into the AVS if appropriate.

## 2024-07-23 NOTE — ASSESSMENT & PLAN NOTE
The patient was educated about the need to gain weight.  He says that he has lost weight because he was sick with pneumonia.  He is gaining weight since getting over pneumonia.  Increased caloric intake foods were encouraged.

## 2024-07-23 NOTE — ASSESSMENT & PLAN NOTE
The patient's anxiety is currently well-controlled with Xanax quarter milligrams nightly.  UDS, controlled medication contract and Dawit in the chart for review.  The patient's abuse risk is very low.

## 2024-07-24 ENCOUNTER — PATIENT ROUNDING (BHMG ONLY) (OUTPATIENT)
Dept: FAMILY MEDICINE CLINIC | Facility: CLINIC | Age: 74
End: 2024-07-24
Payer: MEDICARE

## 2024-10-10 RX ORDER — ALPRAZOLAM 0.25 MG
0.25 TABLET ORAL NIGHTLY PRN
Qty: 30 TABLET | Refills: 5 | Status: SHIPPED | OUTPATIENT
Start: 2024-10-10

## 2024-11-07 NOTE — TELEPHONE ENCOUNTER
Called let patient know medication was sent in.   Progress Note - Hospitalist   Name: Zee Kirk 74 y.o. female I MRN: [id number]  Unit/Bed#: -01 I Date of Admission: 11/6/2024   Date of Service: 11/7/2024 I Hospital Day: 1    Assessment & Plan  Acute on chronic heart failure with preserved ejection fraction (HFpEF) ([facility])  Wt Readings from Last 3 Encounters:   11/07/24 (!) 198 kg (436 lb)   07/06/24 (!) 196 kg (432 lb 1.6 oz)   06/04/24 (!) 186 kg (410 lb)     Patient presented (11/6) with multiple complaints including generalized fatigue, shortness of breath, nonproductive cough, which has been persistent/progressive over the past several days  Patient mostly bedbound at home with 24-hour caregivers secondary to morbid obesity and multiple medical problems  Reportedly, has been unable to take her diuretic consistently over the past week or so secondary to lower blood pressures which she states is chronic  In ED, patient hemodynamically stable, without SIRS criteria, transient desaturation to 88% requiring 2 L nasal cannula  Patient noted with bibasilar crackles on arrival  Chest x-ray did not appreciate consolidations  Reported weight was up 6 pounds from baseline  Furthermore, patient weight up from dry weight approximately 6 pounds raising concern for exacerbation of underlying HFpEF  Received dose of IV diuretics in the ED and initiated on IV diuretics per CHF algorithm-of note patient did not receive last evening's Lasix due to hypotension  Patient reported she was not taking her outpatient midodrine because her blood pressure was low.  I explained to her that this is why she is on midodrine and patient remarked that she thought it was for high blood pressure.  Increased midodrine dosing to 5 mg 3 times daily with meals today to help support blood pressure so diuresis can be given.  Decreased hold parameters to 100 mg systolic BP  Will also give dose of albumin to help support blood pressure  Continue to monitor fluid status closely  Daily  weights  Intake and output monitoring  Currently requiring 6 L nasal cannula oxygen, baseline is no oxygen  Respiratory protocol continue to wean oxygen as able        Hypoxic episode  Patient with apparent desaturation upon EMS arrival-86% requiring 2 L  Upon arrival to ED, trialed on room air-initially saturating in the mid 90s however had transient desaturation to 88% requiring initiation of 2 L nasal cannula  Patient not oxygen dependent at baseline  See plan for acute on chronic heart failure with preserved ejection fraction  Panniculitis  Patient with chronic wounds/lymphedema  Patient with complaint of redness and slight pain in the fold/pannus right abdomen  On examination, appears more fungal in nature with satellite lesions however cannot rule out superimposed bacterial infection  Patient without SIRS criteria upon presentation  In ED, received broad-spectrum intravenous antimicrobials-vancomycin/cefepime  Hold off on further antibiotic therapy at this time given negative procalcitonin, improving leukocytosis and lack of fever    Other specified hypothyroidism  Continue prehospital levothyroxine  Mild episode of recurrent major depressive disorder (HCC)  Continue prehospital Cymbalta  Idiopathic chronic gout of multiple sites without tophus  Chronic condition/stable  Does not appear to have gouty flare at this juncture  Continue to monitor  Class 3 severe obesity due to excess calories with serious comorbidity and body mass index (BMI) greater than or equal to 70 in adult (HCC)  BMI 74.84 counseled  Counseled on diet, exercise, lifestyle changes  Gastroesophageal reflux disease without esophagitis  Continue PPI  Longstanding persistent atrial fibrillation (HCC)  Chronic condition/stable  Rate controlled, on Coumadin for anticoagulation  Therapeutic INR upon presentation  Daily PT/INR-continue Coumadin  Type 2 diabetes mellitus without complication, without long-term current use of insulin ([facility])    Lab  Results   Component Value Date    HGBA1C 7.2 (H) 11/06/2024       Recent Labs     11/06/24  2143 11/07/24  1130   POCGLU 175* 106       Blood Sugar Average: Last 72 hrs:  (P) 140.5  Patient not maintained on antihyperglycemic's in the outpatient setting  Continue sliding scale insulin coverage ACHS  Patient is adamant that she does not have diabetes  BMP a.m.    VTE Pharmacologic Prophylaxis: VTE Score: 8 High Risk (Score >/= 5) - Pharmacological DVT Prophylaxis Ordered: warfarin (Coumadin). Sequential Compression Devices Ordered.    Mobility:   Basic Mobility Inpatient Raw Score: 8  JH-HLM Goal: 3: Sit at edge of bed  JH-HLM Achieved: 2: Bed activities/Dependent transfer  JH-HLM Goal NOT achieved. Continue with multidisciplinary rounding and encourage appropriate mobility to improve upon -HLM goals.    Patient Centered Rounds: I performed bedside rounds with nursing staff today.   Discussions with Specialists or Other Care Team Provider: Nursing, case management    Education and Discussions with Family / Patient: Updated  (son) via phone.    Current Length of Stay: 1 day(s)  Current Patient Status: Inpatient   Certification Statement: The patient will continue to require additional inpatient hospital stay due to continuing IV diuretic  Discharge Plan:  Presented volume overloaded diuresing per algorithm and monitoring fluid status closely.  She is currently on 6 L nasal cannula oxygen baseline is no oxygen.  She is planning to return home when medically stable    Code Status: Level 1 - Full Code    Subjective   Denies any dizziness, lightheadedness, chest pain or palpitations.  Verbalizing needs.    Objective :  Temp:  [97.3 °F (36.3 °C)-98.1 °F (36.7 °C)] 98.1 °F (36.7 °C)  HR:  [67-77] 67  BP: ()/(47-57) 103/55  Resp:  [18-22] 21  SpO2:  [87 %-99 %] 99 %  O2 Device: Nasal cannula  Nasal Cannula O2 Flow Rate (L/min):  [2 L/min-6 L/min] 6 L/min    Body mass index is 74.84 kg/m².     Input and  Output Summary (last 24 hours):     Intake/Output Summary (Last 24 hours) at 11/7/2024 1529  Last data filed at 11/7/2024 1000  Gross per 24 hour   Intake 1390 ml   Output 200 ml   Net 1190 ml       Physical Exam  Vitals and nursing note reviewed.   Constitutional:       General: She is not in acute distress.     Appearance: She is well-developed. She is obese. She is not ill-appearing.   HENT:      Head: Normocephalic and atraumatic.   Cardiovascular:      Rate and Rhythm: Normal rate and regular rhythm.      Heart sounds: No murmur heard.  Pulmonary:      Effort: Pulmonary effort is normal. No respiratory distress.      Breath sounds: No wheezing.      Comments: On O2 6 L nasal cannula, short of breath with exertion, decreased breath sounds bilaterally no cough  Abdominal:      General: Bowel sounds are normal. There is no distension.      Palpations: Abdomen is soft.      Tenderness: There is no abdominal tenderness. There is no guarding.   Musculoskeletal:         General: No swelling.   Skin:     General: Skin is warm and dry.      Capillary Refill: Capillary refill takes less than 2 seconds.   Neurological:      General: No focal deficit present.      Mental Status: She is alert and oriented to person, place, and time. Mental status is at baseline.   Psychiatric:         Mood and Affect: Mood normal.         Behavior: Behavior normal.         Thought Content: Thought content normal.         Judgment: Judgment normal.           Lines/Drains:  Lines/Drains/Airways       Active Status       Name Placement date Placement time Site Days    External Urinary Catheter 11/06/24  2350  -- less than 1                            Lab Results: I have reviewed the following results:   Results from last 7 days   Lab Units 11/07/24  0619 11/06/24  1933   WBC Thousand/uL 13.36* 15.97*   HEMOGLOBIN g/dL 10.0* 10.4*   HEMATOCRIT % 35.6 35.9   PLATELETS Thousands/uL 451* 437*   SEGS PCT %  --  87*   LYMPHO PCT %  --  6*   MONO PCT  %  --  4   EOS PCT %  --  2     Results from last 7 days   Lab Units 11/07/24  0619 11/06/24  1933   SODIUM mmol/L 136 135   POTASSIUM mmol/L 4.0 3.8   CHLORIDE mmol/L 99 97   CO2 mmol/L 32 32   BUN mg/dL 29* 31*   CREATININE mg/dL 1.09 1.22   ANION GAP mmol/L 5 6   CALCIUM mg/dL 7.9* 7.9*   ALBUMIN g/dL  --  2.6*   TOTAL BILIRUBIN mg/dL  --  0.46   ALK PHOS U/L  --  86   ALT U/L  --  8   AST U/L  --  13   GLUCOSE RANDOM mg/dL 107 167*     Results from last 7 days   Lab Units 11/06/24  1933   INR  2.34*     Results from last 7 days   Lab Units 11/07/24  1130 11/06/24  2143   POC GLUCOSE mg/dl 106 175*     Results from last 7 days   Lab Units 11/06/24  1933   HEMOGLOBIN A1C % 7.2*     Results from last 7 days   Lab Units 11/06/24  1933   PROCALCITONIN ng/ml 0.09       Recent Cultures (last 7 days):         Imaging Results Review: I reviewed radiology reports from this admission including: chest xray.  Other Study Results Review: EKG was reviewed.     Last 24 Hours Medication List:     Current Facility-Administered Medications:     acetaminophen (TYLENOL) tablet 650 mg, Q4H PRN    allopurinol (ZYLOPRIM) tablet 100 mg, Daily    DULoxetine (CYMBALTA) delayed release capsule 20 mg, Daily    furosemide (LASIX) injection 50 mg, BID    gabapentin (NEURONTIN) capsule 100 mg, TID    HYDROmorphone (DILAUDID) injection 0.5 mg, Q1H PRN    insulin lispro (HumALOG/ADMELOG) 100 units/mL subcutaneous injection 4-20 Units, TID AC **AND** Fingerstick Glucose (POCT), TID AC    insulin lispro (HumALOG/ADMELOG) 100 units/mL subcutaneous injection 4-20 Units, HS    levothyroxine tablet 137 mcg, Early Morning    midodrine (PROAMATINE) tablet 5 mg, TID AC    nystatin (MYCOSTATIN) powder, BID    oxyCODONE (ROXICODONE) immediate release tablet 10 mg, Q4H PRN    oxyCODONE (ROXICODONE) IR tablet 5 mg, Q4H PRN    triamcinolone (KENALOG) 0.5 % cream 1 Application, TID    Administrative Statements   Today, Patient Was Seen By: Ashley Avila,  ORACIO  I have spent a total time of 39 minutes in caring for this patient on the day of the visit/encounter including Patient and family education, Documenting in the medical record, Reviewing / ordering tests, medicine, procedures  , Obtaining or reviewing history  , and Communicating with other healthcare professionals .    **Please Note: This note may have been constructed using a voice recognition system.**

## 2024-12-02 ENCOUNTER — TELEPHONE (OUTPATIENT)
Dept: FAMILY MEDICINE CLINIC | Facility: CLINIC | Age: 74
End: 2024-12-02
Payer: MEDICARE

## 2024-12-02 RX ORDER — METOPROLOL SUCCINATE 25 MG/1
25 TABLET, EXTENDED RELEASE ORAL DAILY
Qty: 90 TABLET | Refills: 0 | Status: SHIPPED | OUTPATIENT
Start: 2024-12-02

## 2024-12-02 RX ORDER — METOPROLOL SUCCINATE 25 MG/1
TABLET, EXTENDED RELEASE ORAL
Qty: 90 TABLET | Refills: 0 | Status: SHIPPED | OUTPATIENT
Start: 2024-12-02 | End: 2024-12-02 | Stop reason: SDUPTHER

## 2025-01-23 ENCOUNTER — OFFICE VISIT (OUTPATIENT)
Dept: FAMILY MEDICINE CLINIC | Facility: CLINIC | Age: 75
End: 2025-01-23
Payer: MEDICARE

## 2025-01-23 VITALS
SYSTOLIC BLOOD PRESSURE: 120 MMHG | BODY MASS INDEX: 18.26 KG/M2 | HEIGHT: 71 IN | HEART RATE: 72 BPM | RESPIRATION RATE: 19 BRPM | DIASTOLIC BLOOD PRESSURE: 65 MMHG | TEMPERATURE: 98 F | WEIGHT: 130.4 LBS | OXYGEN SATURATION: 94 %

## 2025-01-23 DIAGNOSIS — N18.31 STAGE 3A CHRONIC KIDNEY DISEASE: Chronic | ICD-10-CM

## 2025-01-23 DIAGNOSIS — Z12.11 COLON CANCER SCREENING: ICD-10-CM

## 2025-01-23 DIAGNOSIS — D69.6 THROMBOCYTOPENIA: Chronic | ICD-10-CM

## 2025-01-23 DIAGNOSIS — I10 ESSENTIAL HYPERTENSION: Chronic | ICD-10-CM

## 2025-01-23 DIAGNOSIS — J44.9 COPD, MODERATE: Chronic | ICD-10-CM

## 2025-01-23 DIAGNOSIS — Z00.00 MEDICARE ANNUAL WELLNESS VISIT, SUBSEQUENT: Primary | ICD-10-CM

## 2025-01-23 DIAGNOSIS — F51.01 PRIMARY INSOMNIA: Chronic | ICD-10-CM

## 2025-01-23 PROBLEM — R10.84 GENERALIZED ABDOMINAL PAIN: Chronic | Status: RESOLVED | Noted: 2022-04-15 | Resolved: 2025-01-23

## 2025-01-23 PROCEDURE — 3078F DIAST BP <80 MM HG: CPT | Performed by: FAMILY MEDICINE

## 2025-01-23 PROCEDURE — 96160 PT-FOCUSED HLTH RISK ASSMT: CPT | Performed by: FAMILY MEDICINE

## 2025-01-23 PROCEDURE — G0439 PPPS, SUBSEQ VISIT: HCPCS | Performed by: FAMILY MEDICINE

## 2025-01-23 PROCEDURE — 1170F FXNL STATUS ASSESSED: CPT | Performed by: FAMILY MEDICINE

## 2025-01-23 PROCEDURE — 1159F MED LIST DOCD IN RCRD: CPT | Performed by: FAMILY MEDICINE

## 2025-01-23 PROCEDURE — 3074F SYST BP LT 130 MM HG: CPT | Performed by: FAMILY MEDICINE

## 2025-01-23 PROCEDURE — 1160F RVW MEDS BY RX/DR IN RCRD: CPT | Performed by: FAMILY MEDICINE

## 2025-01-23 PROCEDURE — 1126F AMNT PAIN NOTED NONE PRSNT: CPT | Performed by: FAMILY MEDICINE

## 2025-01-23 NOTE — ASSESSMENT & PLAN NOTE
The patient's COPD is stable.  He will be continued on his current medications and we will see him back in 6 months and manage according presentation.

## 2025-01-23 NOTE — PROGRESS NOTES
Subjective   The ABCs of the Annual Wellness Visit  Medicare Wellness Visit      mC Flores is a 74 y.o. patient who presents for a Medicare Wellness Visit.    The following portions of the patient's history were reviewed and   updated as appropriate: allergies, current medications, past family history, past medical history, past social history, past surgical history, and problem list.    Compared to one year ago, the patient's physical   health is the same.  Compared to one year ago, the patient's mental   health is the same.    Recent Hospitalizations:  He was not admitted to the hospital during the last year.     Current Medical Providers:  Patient Care Team:  Nancy Yeh DO as PCP - General (Family Medicine)    Outpatient Medications Prior to Visit   Medication Sig Dispense Refill    albuterol (ACCUNEB) 1.25 MG/3ML nebulizer solution Take 3 mL by nebulization Every 6 (Six) Hours As Needed for Shortness of Air. 100 each 0    albuterol sulfate  (90 Base) MCG/ACT inhaler Inhale 2 puffs Every 4 (Four) Hours As Needed for Wheezing. 1 g 5    ALPRAZolam (XANAX) 0.25 MG tablet Take 1 tablet by mouth At Night As Needed for Anxiety. 30 tablet 5    ASPIRIN 81 PO Take  by mouth.      cetirizine (zyrTEC) 10 MG tablet Take 1 tablet by mouth Daily. 30 tablet 11    metoprolol succinate XL (TOPROL-XL) 25 MG 24 hr tablet Take 1 tablet by mouth Daily. 90 tablet 0    rosuvastatin (CRESTOR) 5 MG tablet Take 1 tablet by mouth Daily. 90 tablet 1     No facility-administered medications prior to visit.     No opioid medication identified on active medication list. I have reviewed chart for other potential  high risk medication/s and harmful drug interactions in the elderly.      Aspirin is on active medication list. Aspirin use is indicated based on review of current medical condition/s. Pros and cons of this therapy have been discussed today. Benefits of this medication outweigh potential harm.  Patient has been  "encouraged to continue taking this medication.  .      Patient Active Problem List   Diagnosis    History of bladder cancer    Essential hypertension    Mixed hyperlipidemia    Primary insomnia    History of smoking    Periprosthetic fracture around internal prosthetic left hip joint    Myocardial infarction    Arteriosclerosis of coronary artery    Congestive heart failure    Thrombocytopenia    COPD, moderate    Medicare annual wellness visit, subsequent    ADAMA (generalized anxiety disorder)    Seasonal allergies    Body mass index (BMI) of 19 or less in adult    Pneumonia of right lower lobe due to infectious organism    Dyspnea    Stage 3a chronic kidney disease     Advance Care Planning Advance Directive is not on file.  ACP discussion was declined by the patient. Patient does not have an advance directive, information provided.            Objective   Vitals:    01/23/25 1248   BP: 120/65   BP Location: Left arm   Patient Position: Sitting   Cuff Size: Adult   Pulse: 72   Resp: 19   Temp: 98 °F (36.7 °C)   TempSrc: Temporal   SpO2: 94%   Weight: 59.1 kg (130 lb 6.4 oz)   Height: 180.3 cm (70.98\")   PainSc: 0-No pain       Estimated body mass index is 18.2 kg/m² as calculated from the following:    Height as of this encounter: 180.3 cm (70.98\").    Weight as of this encounter: 59.1 kg (130 lb 6.4 oz).    BMI is below normal parameters (malnutrition). Recommendations: Information on healthy weight added to patient's after visit summary           Does the patient have evidence of cognitive impairment? No                                                                                                Health  Risk Assessment    Smoking Status:  Social History     Tobacco Use   Smoking Status Former    Current packs/day: 0.00    Average packs/day: 1 pack/day for 50.0 years (50.0 ttl pk-yrs)    Types: Cigarettes    Start date: 1/1/1972    Quit date: 10/1/2021    Years since quitting: 3.3    Passive exposure: Past "   Smokeless Tobacco Never   Tobacco Comments    smokes a average of 1ppw, no second hand smoke exposure now .     Alcohol Consumption:  Social History     Substance and Sexual Activity   Alcohol Use Never       Fall Risk Screen  STEADI Fall Risk Assessment was completed, and patient is at LOW risk for falls.Assessment completed on:2025    Depression Screening   Little interest or pleasure in doing things? Not at all   Feeling down, depressed, or hopeless? Not at all   PHQ-2 Total Score 0      Health Habits and Functional and Cognitive Screenin/23/2025    12:00 PM   Functional & Cognitive Status   Do you have difficulty preparing food and eating? No   Do you have difficulty bathing yourself, getting dressed or grooming yourself? No   Do you have difficulty using the toilet? No   Do you have difficulty moving around from place to place? No   Do you have trouble with steps or getting out of a bed or a chair? No   Current Diet Well Balanced Diet   Dental Exam Up to date   Eye Exam Up to date   Exercise (times per week) 0 times per week   Current Exercises Include No Regular Exercise   Do you need help using the phone?  No   Are you deaf or do you have serious difficulty hearing?  Yes   Do you need help to go to places out of walking distance? No   Do you need help shopping? No   Do you need help preparing meals?  No   Do you need help with housework?  No   Do you need help with laundry? No   Do you need help taking your medications? No   Do you need help managing money? No   Do you ever drive or ride in a car without wearing a seat belt? No   Have you felt unusual stress, anger or loneliness in the last month? No   Who do you live with? Alone   If you need help, do you have trouble finding someone available to you? No   Have you been bothered in the last four weeks by sexual problems? No   Do you have difficulty concentrating, remembering or making decisions? No           Age-appropriate Screening  Schedule:  Refer to the list below for future screening recommendations based on patient's age, sex and/or medical conditions. Orders for these recommended tests are listed in the plan section. The patient has been provided with a written plan.    Health Maintenance List  Health Maintenance   Topic Date Due    COLORECTAL CANCER SCREENING  Never done    TDAP/TD VACCINES (1 - Tdap) Never done    ZOSTER VACCINE (2 of 3) 02/12/2015    LIPID PANEL  02/09/2024    Pneumococcal Vaccine 65+ (1 of 2 - PCV) 01/23/2025 (Originally 12/13/1956)    LUNG CANCER SCREENING  01/23/2025 (Originally 10/27/2023)    COVID-19 Vaccine (1 - 2024-25 season) 01/25/2025 (Originally 9/1/2024)    INFLUENZA VACCINE  03/31/2025 (Originally 7/1/2024)    ANNUAL WELLNESS VISIT  01/23/2026    BMI FOLLOWUP  01/23/2026    HEPATITIS C SCREENING  Completed    AAA SCREEN ONCE  Completed                                                                                                                                                CMS Preventative Services Quick Reference  Risk Factors Identified During Encounter  Fall Risk-High or Moderate: Discussed Fall Prevention in the home    The above risks/problems have been discussed with the patient.  Pertinent information has been shared with the patient in the After Visit Summary.  An After Visit Summary and PPPS were made available to the patient.    Follow Up:   Next Medicare Wellness visit to be scheduled in 1 year.         Additional E&M Note during same encounter follows:  Patient has additional, significant, and separately identifiable condition(s)/problem(s) that require work above and beyond the Medicare Wellness Visit     Chief Complaint  Medicare Wellness-subsequent    Subjective   HPI  Cm is also being seen today for additional medical problem/s.    Review of Systems   HENT:  Negative for trouble swallowing.    Eyes:  Negative for visual disturbance.   Respiratory:  Negative for apnea.   "  Cardiovascular:  Negative for chest pain.   Gastrointestinal:  Negative for blood in stool.   Endocrine: Negative for polyphagia.   Genitourinary:  Negative for dysuria.   Skin:  Negative for color change.   Allergic/Immunologic: Negative for immunocompromised state.   Neurological:  Negative for seizures.   Hematological:  Negative for adenopathy.   Psychiatric/Behavioral:  Negative for behavioral problems.               Objective   Vital Signs:  /65 (BP Location: Left arm, Patient Position: Sitting, Cuff Size: Adult)   Pulse 72   Temp 98 °F (36.7 °C) (Temporal)   Resp 19   Ht 180.3 cm (70.98\")   Wt 59.1 kg (130 lb 6.4 oz)   SpO2 94%   BMI 18.20 kg/m²   Physical Exam  Vitals reviewed.   Constitutional:       Appearance: He is well-developed and underweight.   HENT:      Head: Normocephalic and atraumatic.      Right Ear: External ear normal.      Left Ear: External ear normal.      Mouth/Throat:      Pharynx: No oropharyngeal exudate.   Eyes:      Conjunctiva/sclera: Conjunctivae normal.      Pupils: Pupils are equal, round, and reactive to light.   Neck:      Vascular: No carotid bruit.   Cardiovascular:      Rate and Rhythm: Normal rate and regular rhythm.      Heart sounds: No murmur heard.     No friction rub. No gallop.   Pulmonary:      Effort: Pulmonary effort is normal.      Breath sounds: Normal breath sounds. No wheezing or rhonchi.   Abdominal:      General: There is no distension.   Skin:     General: Skin is warm and dry.   Neurological:      Mental Status: He is alert and oriented to person, place, and time.      Cranial Nerves: No cranial nerve deficit.      Motor: No weakness.   Psychiatric:         Mood and Affect: Mood and affect normal.         Behavior: Behavior normal.         Thought Content: Thought content normal.         Judgment: Judgment normal.                             Assessment and Plan            Medicare annual wellness visit, subsequent         Stage 3a chronic " kidney disease  Renal condition is worsening.  Continue current treatment regimen.  Renal condition will be reassessed in 6 months.         Colon cancer screening    Orders:    Cologuard - Stool, Per Rectum; Future    Essential hypertension  Hypertension is stable and controlled  Continue current treatment regimen.  Dietary sodium restriction.  Blood pressure will be reassessed in 6 months.         COPD, moderate  The patient's COPD is stable.  He will be continued on his current medications and we will see him back in 6 months and manage according presentation.       Body mass index (BMI) of 19 or less in adult  The patient was educated about the need to gain weight. He says that he has lost weight because he was sick with pneumonia. He is gaining weight since getting over pneumonia. Increased caloric intake foods were encouraged.        Primary insomnia  The patient's insomnia is currently well controlled with an occasional quarter milligram of Xanax as needed at night.        Thrombocytopenia  The patient was educated about thrombocytopenia.  He verbalized understanding.  He is amendable to go to hematology for further medical evaluation management.                 Follow Up   Return in about 6 months (around 7/23/2025).  Patient was given instructions and counseling regarding his condition or for health maintenance advice. Please see specific information pulled into the AVS if appropriate.

## 2025-01-23 NOTE — ASSESSMENT & PLAN NOTE
Renal condition is worsening.  Continue current treatment regimen.  Renal condition will be reassessed in 6 months.

## 2025-01-27 ENCOUNTER — HOSPITAL ENCOUNTER (INPATIENT)
Facility: HOSPITAL | Age: 75
LOS: 2 days | Discharge: HOME | End: 2025-01-29
Attending: EMERGENCY MEDICINE | Admitting: STUDENT IN AN ORGANIZED HEALTH CARE EDUCATION/TRAINING PROGRAM
Payer: MEDICARE

## 2025-01-27 ENCOUNTER — APPOINTMENT (OUTPATIENT)
Dept: GENERAL RADIOLOGY | Facility: HOSPITAL | Age: 75
End: 2025-01-27
Payer: MEDICARE

## 2025-01-27 DIAGNOSIS — E86.0 DEHYDRATION WITH HYPONATREMIA: ICD-10-CM

## 2025-01-27 DIAGNOSIS — R06.02 SHORTNESS OF BREATH: ICD-10-CM

## 2025-01-27 DIAGNOSIS — J10.1 INFLUENZA A: Primary | ICD-10-CM

## 2025-01-27 DIAGNOSIS — R26.2 DIFFICULTY WALKING: ICD-10-CM

## 2025-01-27 DIAGNOSIS — E87.1 DEHYDRATION WITH HYPONATREMIA: ICD-10-CM

## 2025-01-27 PROBLEM — J96.91 HYPOXIC RESPIRATORY FAILURE: Status: ACTIVE | Noted: 2025-01-27

## 2025-01-27 LAB
ALBUMIN SERPL-MCNC: 4.1 G/DL (ref 3.5–5.2)
ALBUMIN/GLOB SERPL: 1.6 G/DL
ALP SERPL-CCNC: 53 U/L (ref 39–117)
ALT SERPL W P-5'-P-CCNC: 5 U/L (ref 1–41)
ANION GAP SERPL CALCULATED.3IONS-SCNC: 12.2 MMOL/L (ref 5–15)
AST SERPL-CCNC: 26 U/L (ref 1–40)
BASOPHILS # BLD AUTO: 0.02 10*3/MM3 (ref 0–0.2)
BASOPHILS NFR BLD AUTO: 0.5 % (ref 0–1.5)
BILIRUB SERPL-MCNC: 1.2 MG/DL (ref 0–1.2)
BUN SERPL-MCNC: 23 MG/DL (ref 8–23)
BUN/CREAT SERPL: 13.4 (ref 7–25)
CALCIUM SPEC-SCNC: 8.4 MG/DL (ref 8.6–10.5)
CHLORIDE SERPL-SCNC: 88 MMOL/L (ref 98–107)
CO2 SERPL-SCNC: 24.8 MMOL/L (ref 22–29)
CREAT SERPL-MCNC: 1.72 MG/DL (ref 0.76–1.27)
D-LACTATE SERPL-SCNC: 1.3 MMOL/L (ref 0.5–2)
DEPRECATED RDW RBC AUTO: 46.4 FL (ref 37–54)
EGFRCR SERPLBLD CKD-EPI 2021: 41.5 ML/MIN/1.73
EOSINOPHIL # BLD AUTO: 0.01 10*3/MM3 (ref 0–0.4)
EOSINOPHIL NFR BLD AUTO: 0.3 % (ref 0.3–6.2)
ERYTHROCYTE [DISTWIDTH] IN BLOOD BY AUTOMATED COUNT: 14.4 % (ref 12.3–15.4)
FLUAV SUBTYP SPEC NAA+PROBE: DETECTED
FLUBV RNA ISLT QL NAA+PROBE: NOT DETECTED
GEN 5 1HR TROPONIN T REFLEX: 27 NG/L
GLOBULIN UR ELPH-MCNC: 2.6 GM/DL
GLUCOSE SERPL-MCNC: 90 MG/DL (ref 65–99)
HCT VFR BLD AUTO: 41.8 % (ref 37.5–51)
HGB BLD-MCNC: 14.5 G/DL (ref 13–17.7)
HOLD SPECIMEN: NORMAL
HOLD SPECIMEN: NORMAL
IMM GRANULOCYTES # BLD AUTO: 0.03 10*3/MM3 (ref 0–0.05)
IMM GRANULOCYTES NFR BLD AUTO: 0.8 % (ref 0–0.5)
LYMPHOCYTES # BLD AUTO: 1.2 10*3/MM3 (ref 0.7–3.1)
LYMPHOCYTES NFR BLD AUTO: 32.4 % (ref 19.6–45.3)
MCH RBC QN AUTO: 31 PG (ref 26.6–33)
MCHC RBC AUTO-ENTMCNC: 34.7 G/DL (ref 31.5–35.7)
MCV RBC AUTO: 89.3 FL (ref 79–97)
MONOCYTES # BLD AUTO: 0.42 10*3/MM3 (ref 0.1–0.9)
MONOCYTES NFR BLD AUTO: 11.4 % (ref 5–12)
NEUTROPHILS NFR BLD AUTO: 2.02 10*3/MM3 (ref 1.7–7)
NEUTROPHILS NFR BLD AUTO: 54.6 % (ref 42.7–76)
NRBC BLD AUTO-RTO: 0 /100 WBC (ref 0–0.2)
NT-PROBNP SERPL-MCNC: 9098 PG/ML (ref 0–900)
PLATELET # BLD AUTO: 68 10*3/MM3 (ref 140–450)
PMV BLD AUTO: 9.7 FL (ref 6–12)
POLYCHROMASIA BLD QL SMEAR: NORMAL
POTASSIUM SERPL-SCNC: 4.6 MMOL/L (ref 3.5–5.2)
PROCALCITONIN SERPL-MCNC: 0.25 NG/ML (ref 0–0.25)
PROT SERPL-MCNC: 6.7 G/DL (ref 6–8.5)
QT INTERVAL: 344 MS
QTC INTERVAL: 430 MS
RBC # BLD AUTO: 4.68 10*6/MM3 (ref 4.14–5.8)
RSV RNA NPH QL NAA+NON-PROBE: NOT DETECTED
SARS-COV-2 RNA RESP QL NAA+PROBE: NOT DETECTED
SMALL PLATELETS BLD QL SMEAR: NORMAL
SODIUM SERPL-SCNC: 125 MMOL/L (ref 136–145)
TROPONIN T % DELTA: -7
TROPONIN T NUMERIC DELTA: -2 NG/L
TROPONIN T SERPL HS-MCNC: 29 NG/L
WBC MORPH BLD: NORMAL
WBC NRBC COR # BLD AUTO: 3.7 10*3/MM3 (ref 3.4–10.8)
WHOLE BLOOD HOLD COAG: NORMAL
WHOLE BLOOD HOLD SPECIMEN: NORMAL

## 2025-01-27 PROCEDURE — 87040 BLOOD CULTURE FOR BACTERIA: CPT | Performed by: EMERGENCY MEDICINE

## 2025-01-27 PROCEDURE — 99223 1ST HOSP IP/OBS HIGH 75: CPT | Performed by: STUDENT IN AN ORGANIZED HEALTH CARE EDUCATION/TRAINING PROGRAM

## 2025-01-27 PROCEDURE — 25810000003 SODIUM CHLORIDE 0.9 % SOLUTION: Performed by: STUDENT IN AN ORGANIZED HEALTH CARE EDUCATION/TRAINING PROGRAM

## 2025-01-27 PROCEDURE — 99285 EMERGENCY DEPT VISIT HI MDM: CPT

## 2025-01-27 PROCEDURE — 84145 PROCALCITONIN (PCT): CPT | Performed by: STUDENT IN AN ORGANIZED HEALTH CARE EDUCATION/TRAINING PROGRAM

## 2025-01-27 PROCEDURE — 85025 COMPLETE CBC W/AUTO DIFF WBC: CPT | Performed by: EMERGENCY MEDICINE

## 2025-01-27 PROCEDURE — 36415 COLL VENOUS BLD VENIPUNCTURE: CPT | Performed by: EMERGENCY MEDICINE

## 2025-01-27 PROCEDURE — 94799 UNLISTED PULMONARY SVC/PX: CPT

## 2025-01-27 PROCEDURE — 80053 COMPREHEN METABOLIC PANEL: CPT | Performed by: EMERGENCY MEDICINE

## 2025-01-27 PROCEDURE — 83880 ASSAY OF NATRIURETIC PEPTIDE: CPT | Performed by: EMERGENCY MEDICINE

## 2025-01-27 PROCEDURE — 93005 ELECTROCARDIOGRAM TRACING: CPT

## 2025-01-27 PROCEDURE — 87637 SARSCOV2&INF A&B&RSV AMP PRB: CPT | Performed by: EMERGENCY MEDICINE

## 2025-01-27 PROCEDURE — 93005 ELECTROCARDIOGRAM TRACING: CPT | Performed by: EMERGENCY MEDICINE

## 2025-01-27 PROCEDURE — 25010000002 ENOXAPARIN PER 10 MG: Performed by: STUDENT IN AN ORGANIZED HEALTH CARE EDUCATION/TRAINING PROGRAM

## 2025-01-27 PROCEDURE — 84484 ASSAY OF TROPONIN QUANT: CPT | Performed by: EMERGENCY MEDICINE

## 2025-01-27 PROCEDURE — 83605 ASSAY OF LACTIC ACID: CPT | Performed by: EMERGENCY MEDICINE

## 2025-01-27 PROCEDURE — 94640 AIRWAY INHALATION TREATMENT: CPT

## 2025-01-27 PROCEDURE — 25810000003 SODIUM CHLORIDE 0.9 % SOLUTION: Performed by: EMERGENCY MEDICINE

## 2025-01-27 PROCEDURE — 85007 BL SMEAR W/DIFF WBC COUNT: CPT | Performed by: EMERGENCY MEDICINE

## 2025-01-27 PROCEDURE — 71045 X-RAY EXAM CHEST 1 VIEW: CPT

## 2025-01-27 PROCEDURE — 25010000002 CEFTRIAXONE PER 250 MG: Performed by: STUDENT IN AN ORGANIZED HEALTH CARE EDUCATION/TRAINING PROGRAM

## 2025-01-27 RX ORDER — BISACODYL 5 MG/1
5 TABLET, DELAYED RELEASE ORAL DAILY PRN
Status: DISCONTINUED | OUTPATIENT
Start: 2025-01-27 | End: 2025-01-29 | Stop reason: HOSPADM

## 2025-01-27 RX ORDER — IPRATROPIUM BROMIDE AND ALBUTEROL SULFATE 2.5; .5 MG/3ML; MG/3ML
3 SOLUTION RESPIRATORY (INHALATION)
Status: DISCONTINUED | OUTPATIENT
Start: 2025-01-27 | End: 2025-01-29 | Stop reason: HOSPADM

## 2025-01-27 RX ORDER — ONDANSETRON 2 MG/ML
4 INJECTION INTRAMUSCULAR; INTRAVENOUS EVERY 6 HOURS PRN
Status: DISCONTINUED | OUTPATIENT
Start: 2025-01-27 | End: 2025-01-29 | Stop reason: HOSPADM

## 2025-01-27 RX ORDER — SODIUM CHLORIDE 0.9 % (FLUSH) 0.9 %
10 SYRINGE (ML) INJECTION EVERY 12 HOURS SCHEDULED
Status: DISCONTINUED | OUTPATIENT
Start: 2025-01-27 | End: 2025-01-29 | Stop reason: HOSPADM

## 2025-01-27 RX ORDER — POLYETHYLENE GLYCOL 3350 17 G/17G
17 POWDER, FOR SOLUTION ORAL DAILY PRN
Status: DISCONTINUED | OUTPATIENT
Start: 2025-01-27 | End: 2025-01-29 | Stop reason: HOSPADM

## 2025-01-27 RX ORDER — BISACODYL 10 MG
10 SUPPOSITORY, RECTAL RECTAL DAILY PRN
Status: DISCONTINUED | OUTPATIENT
Start: 2025-01-27 | End: 2025-01-29 | Stop reason: HOSPADM

## 2025-01-27 RX ORDER — ENOXAPARIN SODIUM 100 MG/ML
40 INJECTION SUBCUTANEOUS EVERY 24 HOURS
Status: DISCONTINUED | OUTPATIENT
Start: 2025-01-27 | End: 2025-01-29 | Stop reason: HOSPADM

## 2025-01-27 RX ORDER — IBUPROFEN 400 MG/1
600 TABLET, FILM COATED ORAL ONCE
Status: DISCONTINUED | OUTPATIENT
Start: 2025-01-27 | End: 2025-01-29 | Stop reason: HOSPADM

## 2025-01-27 RX ORDER — NITROGLYCERIN 0.4 MG/1
0.4 TABLET SUBLINGUAL
Status: DISCONTINUED | OUTPATIENT
Start: 2025-01-27 | End: 2025-01-29 | Stop reason: HOSPADM

## 2025-01-27 RX ORDER — DOXYCYCLINE 100 MG/1
100 CAPSULE ORAL EVERY 12 HOURS SCHEDULED
Status: DISCONTINUED | OUTPATIENT
Start: 2025-01-27 | End: 2025-01-27

## 2025-01-27 RX ORDER — SODIUM CHLORIDE 9 MG/ML
125 INJECTION, SOLUTION INTRAVENOUS CONTINUOUS
Status: ACTIVE | OUTPATIENT
Start: 2025-01-27 | End: 2025-01-27

## 2025-01-27 RX ORDER — SODIUM CHLORIDE 0.9 % (FLUSH) 0.9 %
10 SYRINGE (ML) INJECTION AS NEEDED
Status: DISCONTINUED | OUTPATIENT
Start: 2025-01-27 | End: 2025-01-29 | Stop reason: HOSPADM

## 2025-01-27 RX ORDER — ACETAMINOPHEN 325 MG/1
975 TABLET ORAL ONCE
Status: COMPLETED | OUTPATIENT
Start: 2025-01-27 | End: 2025-01-27

## 2025-01-27 RX ORDER — ACETAMINOPHEN 325 MG/1
650 TABLET ORAL EVERY 4 HOURS PRN
Status: DISCONTINUED | OUTPATIENT
Start: 2025-01-27 | End: 2025-01-29 | Stop reason: HOSPADM

## 2025-01-27 RX ORDER — OSELTAMIVIR PHOSPHATE 75 MG/1
75 CAPSULE ORAL ONCE
Status: COMPLETED | OUTPATIENT
Start: 2025-01-27 | End: 2025-01-27

## 2025-01-27 RX ORDER — AMOXICILLIN 250 MG
2 CAPSULE ORAL 2 TIMES DAILY PRN
Status: DISCONTINUED | OUTPATIENT
Start: 2025-01-27 | End: 2025-01-29 | Stop reason: HOSPADM

## 2025-01-27 RX ORDER — SODIUM CHLORIDE 9 MG/ML
40 INJECTION, SOLUTION INTRAVENOUS AS NEEDED
Status: DISCONTINUED | OUTPATIENT
Start: 2025-01-27 | End: 2025-01-29 | Stop reason: HOSPADM

## 2025-01-27 RX ADMIN — SODIUM CHLORIDE 1000 ML: 9 INJECTION, SOLUTION INTRAVENOUS at 15:08

## 2025-01-27 RX ADMIN — DOXYCYCLINE 100 MG: 100 INJECTION, POWDER, LYOPHILIZED, FOR SOLUTION INTRAVENOUS at 16:55

## 2025-01-27 RX ADMIN — OSELTAMIVIR PHOSPHATE 75 MG: 75 CAPSULE ORAL at 13:32

## 2025-01-27 RX ADMIN — Medication 10 ML: at 16:56

## 2025-01-27 RX ADMIN — SODIUM CHLORIDE 125 ML/HR: 9 INJECTION, SOLUTION INTRAVENOUS at 15:55

## 2025-01-27 RX ADMIN — SODIUM CHLORIDE 1000 ML: 9 INJECTION, SOLUTION INTRAVENOUS at 13:32

## 2025-01-27 RX ADMIN — SODIUM CHLORIDE 1000 MG: 9 INJECTION INTRAMUSCULAR; INTRAVENOUS; SUBCUTANEOUS at 16:56

## 2025-01-27 RX ADMIN — ACETAMINOPHEN 975 MG: 325 TABLET ORAL at 12:09

## 2025-01-27 RX ADMIN — IPRATROPIUM BROMIDE AND ALBUTEROL SULFATE 3 ML: .5; 3 SOLUTION RESPIRATORY (INHALATION) at 19:51

## 2025-01-27 RX ADMIN — ENOXAPARIN SODIUM 40 MG: 100 INJECTION SUBCUTANEOUS at 16:56

## 2025-01-27 RX ADMIN — IPRATROPIUM BROMIDE AND ALBUTEROL SULFATE 3 ML: .5; 3 SOLUTION RESPIRATORY (INHALATION) at 16:32

## 2025-01-27 NOTE — ED PROVIDER NOTES
Time: 11:54 AM EST  Date of encounter:  1/27/2025  Independent Historian/Clinical History and Information was obtained by:   Patient and Family    History is limited by: Dementia, Altered Mental Status    Chief Complaint: Fever, shortness of breath, cough and congestion      History of Present Illness:  Patient is a 74 y.o. year old male with history of CAD and CHF and tobacco abuse who presents to the emergency department for evaluation of acute onset of illness with cough and chest congestion, mostly clear sputum production, fever of 101.8 Fahrenheit and looks dyspneic on arrival.    He is oxygenating in the 90 to 91% range on room air and was placed on 2 L of oxygen.    Denies any sick contacts or history of pneumonia but overall appears to be a poor historian.    EMS already gave him some DuoNeb breathing treatments prior to arrival.      Patient Care Team  Primary Care Provider: Nancy Yeh DO    Past Medical History:     Allergies   Allergen Reactions    Azithromycin Unknown - High Severity     zpak    Quinolones Other (See Comments)     History of abdominal aortic aneurysm repair    Morphine Unknown - Low Severity    Penicillin G Unknown - Low Severity    Penicillins Unknown - Low Severity    Sulfa Antibiotics Unknown - Low Severity     Past Medical History:   Diagnosis Date    Anxiety     Arthritis     Bladder cancer     Bladder cancer 2010    lining of bladder     Broken bones      as a child     CAD (coronary artery disease) 02/2014    Calculus of kidney     Cataracts, both eyes     CHF (congestive heart failure)     Constipation     Deafness     Depression     Essential hypertension 6/24/2021    Forgetfulness     Gallstones     Head injury     Heart disease     Hemorrhoids     Hernia of pelvic floor     High cholesterol     High cholesterol     History of bladder cancer 6/24/2021    History of heart attack     Hypertension     Kidney stones     Mixed hyperlipidemia 6/24/2021    Primary insomnia  6/24/2021    Shortness of breath     Sinus trouble     Skin disease     UNSURE WHAT KIND/ARMS    Tobacco abuse 6/24/2021     Past Surgical History:   Procedure Laterality Date    ABDOMINAL AORTIC ANEURYSM REPAIR      CAROTID ENDARTERECTOMY Right 02/2014    Dr. Boudreaux    CHOLECYSTECTOMY      CHOLECYSTECTOMY OPEN      CHOLECYSTECTOMY OPEN      CORONARY ARTERY BYPASS GRAFT      CYSTOSCOPY      Cystoscopy, Bladder BX 2004; Cysto, Urethral Dilattion, Bilateral Retrogrades, Biopsy & Fulguration, Left Ureteroscopy 01/17/2013    CYSTOSCOPY RETROGRADE PYELOGRAM Bilateral     EYE SURGERY      KNEE SURGERY Left     Repair    TOTAL HIP ARTHROPLASTY Left      Family History   Problem Relation Age of Onset    Heart disease Other        Home Medications:  Prior to Admission medications    Medication Sig Start Date End Date Taking? Authorizing Provider   albuterol (ACCUNEB) 1.25 MG/3ML nebulizer solution Take 3 mL by nebulization Every 6 (Six) Hours As Needed for Shortness of Air. 6/24/24   Henny Shelton MD   albuterol sulfate  (90 Base) MCG/ACT inhaler Inhale 2 puffs Every 4 (Four) Hours As Needed for Wheezing. 6/25/24   Mariel Saldana APRN   ALPRAZolam (XANAX) 0.25 MG tablet Take 1 tablet by mouth At Night As Needed for Anxiety. 10/10/24   Nancy Yeh DO   ASPIRIN 81 PO Take  by mouth.    Provider, MD Jose Antonio   cetirizine (zyrTEC) 10 MG tablet Take 1 tablet by mouth Daily. 1/23/24   Nancy Yeh DO   metoprolol succinate XL (TOPROL-XL) 25 MG 24 hr tablet Take 1 tablet by mouth Daily. 12/2/24   Nancy Yeh DO   rosuvastatin (CRESTOR) 5 MG tablet Take 1 tablet by mouth Daily. 1/23/24   Nancy Yeh DO        Social History:   Social History     Tobacco Use    Smoking status: Former     Current packs/day: 0.00     Average packs/day: 1 pack/day for 50.0 years (50.0 ttl pk-yrs)     Types: Cigarettes     Start date: 1/1/1972     Quit date: 10/1/2021     Years since quitting: 3.3     Passive  "exposure: Past    Smokeless tobacco: Never    Tobacco comments:     smokes a average of 1ppw, no second hand smoke exposure now .   Vaping Use    Vaping status: Never Used   Substance Use Topics    Alcohol use: Never    Drug use: Never         Review of Systems:  Review of Systems   I performed a 10 point review of systems which was all negative, except for the positives found in the HPI above.  Physical Exam:  /89   Pulse 88   Temp (!) 101.8 °F (38.8 °C) (Oral)   Resp (!) 58   Ht 182.9 cm (72\")   Wt 59.8 kg (131 lb 13.4 oz)   SpO2 95%   BMI 17.88 kg/m²     Physical Exam   General: Awake alert and in mild to moderate respiratory distress, cough and congestion, feels hot to the touch    HEENT: Head normocephalic atraumatic, eyes PERRLA EOMI, nose normal, oropharynx normal.  Mucous membranes look somewhat dry.    Neck: Supple full range of motion, no meningismus, no lymphadenopathy    Heart: Regular rate and rhythm, no murmurs or rubs, 2+ radial pulses bilaterally    Lungs: Mild to moderate respiratory distress with tachypnea and increased work of breathing, minimal wheezes, some rhonchi noted, no crackles    Abdomen: Soft, nontender, nondistended, no rebound or guarding    Skin: Warm, dry, no rash    Musculoskeletal: Normal range of motion, no lower extremity edema    Neurologic: Oriented x3, no motor deficits no sensory deficits    Psychiatric: Mood appears stable, no psychosis            Medical Decision Making:      Comorbidities that affect care:    Congestive Heart Failure, Coronary Artery Disease, Smoking    External Notes reviewed:    Previous Clinic Note: I reviewed his previous clinic visit at urgent care 7 months ago where he was diagnosed with right lower lobe pneumonia.      The following orders were placed and all results were independently analyzed by me:  Orders Placed This Encounter   Procedures    COVID-19, FLU A/B, RSV PCR 1 HR TAT - Swab, Nasopharynx    Blood Culture - Blood,    Blood " Culture - Blood,    XR Chest 1 View    [place] Draw    Comprehensive Metabolic Panel    BNP    High Sensitivity Troponin T    CBC Auto Differential    Lactic Acid, Plasma    Scan Slide    High Sensitivity Troponin T 1Hr    NPO Diet NPO Type: Strict NPO    Undress & Gown    Continuous Pulse Oximetry    Vital Signs    Hospitalist (on-call MD unless specified)    Oxygen Therapy- Nasal Cannula; Titrate 1-6 LPM Per SpO2; 90 - 95%    ECG 12 Lead ED Triage Standing Order; SOA    Insert Peripheral IV    CBC & Differential    Green Top (Gel)    Lavender Top    Gold Top - SST    Light Blue Top       Medications Given in the Emergency Department:  Medications   sodium chloride 0.9 % flush 10 mL (has no administration in time range)   oseltamivir (TAMIFLU) capsule 75 mg (has no administration in time range)   sodium chloride 0.9 % bolus 1,000 mL (has no administration in time range)   acetaminophen (TYLENOL) tablet 975 mg (975 mg Oral Given 1/27/25 1209)        ED Course:    ED Course as of 01/27/25 1313   Mon Jan 27, 2025   1158 EKG: I interpreted his twelve-lead EKG as normal sinus rhythm at 94 bpm, normal P waves, normal QRS, normal ST segments, borderline T wave abnormalities in the anterolateral leads but EKG baseline artifact noted. [VS]      ED Course User Index  [VS] Jhon Burks MD       Labs:    Lab Results (last 24 hours)       Procedure Component Value Units Date/Time    CBC & Differential [[id number]]  (Abnormal) Collected: 01/27/25 1138    Specimen: Blood Updated: 01/27/25 1219    Narrative:      The following orders were created for panel order CBC & Differential.  Procedure                               Abnormality         Status                     ---------                               -----------         ------                     CBC Auto Differential[[id number]]        Abnormal            Final result               Scan Slide[[id number]]                                       Final result                  Please view results for these tests on the individual orders.    Comprehensive Metabolic Panel [[id number]]  (Abnormal) Collected: 01/27/25 1138    Specimen: Blood Updated: 01/27/25 1235     Glucose 90 mg/dL      BUN 23 mg/dL      Creatinine 1.72 mg/dL      Sodium 125 mmol/L      Potassium 4.6 mmol/L      Chloride 88 mmol/L      CO2 24.8 mmol/L      Calcium 8.4 mg/dL      Total Protein 6.7 g/dL      Albumin 4.1 g/dL      ALT (SGPT) 5 U/L      AST (SGOT) 26 U/L      Alkaline Phosphatase 53 U/L      Total Bilirubin 1.2 mg/dL      Globulin 2.6 gm/dL      A/G Ratio 1.6 g/dL      BUN/Creatinine Ratio 13.4     Anion Gap 12.2 mmol/L      eGFR 41.5 mL/min/1.73     Narrative:      GFR Categories in Chronic Kidney Disease (CKD)      GFR Category          GFR (mL/min/1.73)    Interpretation  G1                     90 or greater         Normal or high (1)  G2                      60-89                Mild decrease (1)  G3a                   45-59                Mild to moderate decrease  G3b                   30-44                Moderate to severe decrease  G4                    15-29                Severe decrease  G5                    14 or less           Kidney failure          (1)In the absence of evidence of kidney disease, neither GFR category G1 or G2 fulfill the criteria for CKD.    eGFR calculation 2021 CKD-EPI creatinine equation, which does not include race as a factor    BNP [[id number]]  (Abnormal) Collected: 01/27/25 1138    Specimen: Blood Updated: 01/27/25 1215     proBNP 9,098.0 pg/mL     Narrative:      This assay is used as an aid in the diagnosis of individuals suspected of having heart failure. It can be used as an aid in the diagnosis of acute decompensated heart failure (ADHF) in patients presenting with signs and symptoms of ADHF to the emergency department (ED). In addition, NT-proBNP of <300 pg/mL indicates ADHF is not likely.    Age Range Result Interpretation  NT-proBNP Concentration  (pg/mL:      <50             Positive            >450                   Gray                 300-450                    Negative             <300    50-75           Positive            >900                  Gray                300-900                  Negative            <300      >75             Positive            >1800                  Gray                300-1800                  Negative            <300    High Sensitivity Troponin T [[id number]]  (Abnormal) Collected: 01/27/25 1138    Specimen: Blood Updated: 01/27/25 1220     HS Troponin T 29 ng/L     Narrative:      High Sensitive Troponin T Reference Range:  <14.0 ng/L- Negative Female for AMI  <22.0 ng/L- Negative Male for AMI  >=14 - Abnormal Female indicating possible myocardial injury.  >=22 - Abnormal Male indicating possible myocardial injury.   Clinicians would have to utilize clinical acumen, EKG, Troponin, and serial changes to determine if it is an Acute Myocardial Infarction or myocardial injury due to an underlying chronic condition.         CBC Auto Differential [[id number]]  (Abnormal) Collected: 01/27/25 1138    Specimen: Blood Updated: 01/27/25 1219     WBC 3.70 10*3/mm3      RBC 4.68 10*6/mm3      Hemoglobin 14.5 g/dL      Hematocrit 41.8 %      MCV 89.3 fL      MCH 31.0 pg      MCHC 34.7 g/dL      RDW 14.4 %      RDW-SD 46.4 fl      MPV 9.7 fL      Platelets 68 10*3/mm3      Neutrophil % 54.6 %      Lymphocyte % 32.4 %      Monocyte % 11.4 %      Eosinophil % 0.3 %      Basophil % 0.5 %      Immature Grans % 0.8 %      Neutrophils, Absolute 2.02 10*3/mm3      Lymphocytes, Absolute 1.20 10*3/mm3      Monocytes, Absolute 0.42 10*3/mm3      Eosinophils, Absolute 0.01 10*3/mm3      Basophils, Absolute 0.02 10*3/mm3      Immature Grans, Absolute 0.03 10*3/mm3      nRBC 0.0 /100 WBC     Scan Slide [[id number]] Collected: 01/27/25 1138    Specimen: Blood Updated: 01/27/25 1219     Polychromasia Slight/1+     WBC Morphology Normal     Platelet  Estimate Decreased    COVID-19, FLU A/B, RSV PCR 1 HR TAT - Swab, Nasopharynx [[id number]]  (Abnormal) Collected: 01/27/25 1139    Specimen: Swab from Nasopharynx Updated: 01/27/25 1233     COVID19 Not Detected     Influenza A PCR Detected     Influenza B PCR Not Detected     RSV, PCR Not Detected    Narrative:      Fact sheet for providers: https://www.fda.gov/media/849873/download    Fact sheet for patients: https://www.fda.gov/media/529294/download    Test performed by PCR.    Blood Culture - Blood, Arm, Left [[id number]] Collected: 01/27/25 1139    Specimen: Blood from Arm, Left Updated: 01/27/25 1148    Lactic Acid, Plasma [[id number]]  (Normal) Collected: 01/27/25 1139    Specimen: Blood Updated: 01/27/25 1210     Lactate 1.3 mmol/L     Blood Culture - Blood, Arm, Right [[id number]] Collected: 01/27/25 1144    Specimen: Blood from Arm, Right Updated: 01/27/25 1147             Imaging:    XR Chest 1 View    Result Date: 1/27/2025  XR CHEST 1 VW Date of Exam: 1/27/2025 12:05 PM EST Indication: SOA Triage Protocol Comparison: Chest x-ray 6/24/2024 Findings: The heart is stable in size. Median sternotomy wires are noted. 3 cm left hilar mass appears unchanged. There is left lower lung volume loss with blunting of the costophrenic angle compatible with trace effusion. No evidence for pneumothorax. No focal consolidation.     Impression: Left lower lung volume loss and trace left pleural effusion. Stable appearance to a 3 cm left hilar mass. Electronically Signed: Carlene Arroyo MD  1/27/2025 12:16 PM EST  Workstation ID: [id number]       Differential Diagnosis and Discussion:    Cough: Differential diagnosis includes but is not limited to pneumonia, acute bronchitis, upper respiratory infection, ACE inhibitor use, allergic reaction, epiglottitis, seasonal allergies, chemical irritants, exercise-induced asthma, viral syndrome.  Fever: Based on the complaint of fever, differential diagnosis includes but is not limited to  meningitis, pneumonia, pyelonephritis, acute uti,  systemic immune response syndrome, sepsis, viral syndrome, fungal infection, tick born illness and other bacterial infections.    PROCEDURES:    Labs were collected in the emergency department and all labs were reviewed and interpreted by me.  X-ray were performed in the emergency department and all X-ray impressions were independently interpreted by me.  An EKG was performed and the EKG was interpreted by me.    ECG 12 Lead ED Triage Standing Order; SOA   Preliminary Result   HEART RATE=94  bpm   RR Ikgywhhg=380  ms   IL Alderwky=126  ms   P Horizontal Axis=34  deg   P Front Axis=76  deg   QRSD Interval=92  ms   QT Kchempsa=471  ms   NAdJ=191  ms   QRS Axis=58  deg   T Wave Axis=61  deg   - ABNORMAL ECG -   Sinus rhythm   Prolonged IL interval   Date and Time of Study:2025-01-27 11:22:31          Procedures    MDM           This patient is a 74-year-old male with history of tobacco abuse and heart disease presenting with fever, productive cough and short of breath.    He is satting about 91% on room air and looks tachypneic here and has already had multiple DuoNeb breathing treatments at home and with EMS.    We placed him on supplemental oxygen, swabbing him for COVID and flu and RSV virus, getting blood work and chest x-ray to look for sepsis or pneumonia.      I reviewed his chest x-ray image, interpreting it as showing previous median sternotomy and elevated left hemidiaphragm and opacity seen in the left perihilar area but I do not see any obvious infiltrates or any edema.    He looks weak and dehydrated and still dyspneic, like he will need to be admitted to the hospital.      He ends up testing positive for flu A.  Lab work reflects hyponatremia and hypochloremia and I am giving him some IV normal saline here.  He is on 2 L of oxygen and it looks like he will need to be admitted at this time.                      Patient Care Considerations:    ANTIBIOTICS:  I considered prescribing antibiotics as an outpatient however no bacterial focus of infection was found.      Consultants/Shared Management Plan:    Hospitalist: I have discussed the case with admitting hospitalist who agrees to accept the patient for admission.    Social Determinants of Health:    Patient has presented with family members who are responsible, reliable and will ensure follow up care.      Disposition and Care Coordination:    Admit:   Through independent evaluation of the patient's history, physical, and imperical data, the patient meets criteria for inpatient admission to the hospital.        Final diagnoses:   Influenza A   Dehydration with hyponatremia   Shortness of breath        ED Disposition       ED Disposition   Decision to Admit    Condition   --    Comment   --               This medical record created using voice recognition software.             Jhon Burks MD  01/27/25 3421

## 2025-01-27 NOTE — H&P
Cardiovascular/Thoracic Surgery History and Physical:  9/11/2022    PCP: Joe Almeida MD     Cardiologist: Bj Pandya MD     Reason for consultation: surgical evaluation of CAD and aortic valve disease     Chief Complaint: fatigue and dyspnea     Previous History:  Brendon Alvarez is a 63 year old male with known aortic stenosis. Other past medical history include CAD s/p JADE to RCA 2008 and JADE to LAD 2009) hypertension, hyperlipidemia, diabetes type 2 (HgbA1c 7.0), nicotine dependence and MARTHA with CPAP use. The patient underwent a routine TTE demonstrating severe aortic stenosis with mean gradient 59 mmHg, JENA 1.3 cm2 and peak velocity 5.4 m/s. There was mild dilation of the ascending aorta 4 cm. LVEF was noted to be 63% with mild increased LV wall thickness and severely increased LA size. RVSP was noted to be 46 mmHg. A subsequent heart catheterization was completed on 07/22/22 demonstrating left main stenosis, in stent restenosis of the LAD, in stent restenosis of the RCA and PDA stenosis. Central aortic pressure were noted to be elevated. The patient was seen in the office on 08/10/22 for surgical consultation regarding aortic valve replacement and scheduled for surgery.     HPI: He reports fatigue and dyspnea with strenuous activities. Minimal BLE edema present. The patient denies syncope, dizziness, lightheadedness, chest pain/pressure, palpitations, orthopnea or PND. Last dental visit was 1 year ago and he denies active oral issues. Denies previous chest surgeries. Reports prior left leg femur fracture in 1985.       Past Medical History    Type 2 diabetes mellitus (CMS/HCC)                            Hypertension                                                  CAD (coronary artery disease)                                 Myocardial infarction (CMS/HCC)                                 Comment: 2008 MI with subsequent stents    Heart murmur                                                  [facility]  [facility]   HISTORY AND PHYSICAL    Patient Name: Cm Flores  : 1950  MRN: [id number]  Primary Care Physician:  Nancy Yeh DO  Date of admission: 2025    Subjective   Subjective     Chief Complaint: Shortness of breath    History of Present Illness  Patient is a 74-year-old gentleman with past medical history of COPD, hypertension, who presents to the emergency room with chief complaints of shortness of breath, generalized weakness, poor appetite, muscle aches, and failure to thrive for the past couple of days.  Reports subjective fevers and chills at home.  Denies any recent travels or sick contacts.  Denies chest pain, chest tightness, palpitations, nausea, vomiting, diarrhea, or any other symptoms.  When patient presented to the ED, her vital signs were significant for temperature 101.8.  Labs were significant for creatinine 1.72, sodium 125, platelet of 68, proBNP of 9098, and influenza A PCR swab positive.  Of note COVID was negative.  Chest x-ray trace left pleural effusion chronic 3 cm left hilar mass.  Patient [facility] 1 L [facility] and he is being admitted for further management  Shortness of Breath  Associated symptoms include a fever. Pertinent negatives include no abdominal pain, chest pain or leg swelling.       Review of Systems   Constitutional:  Positive for appetite change, chills, fatigue and fever.   Respiratory:  Positive for shortness of breath. Negative for cough and choking.    Cardiovascular:  Negative for chest pain, palpitations and leg swelling.   Gastrointestinal:  Negative for abdominal distention and abdominal pain.   Endocrine: Negative for cold intolerance and heat intolerance.   Genitourinary:  Negative for dysuria and hematuria.   Musculoskeletal:  Positive for myalgias. Negative for back pain and gait problem.   Neurological:  Negative for dizziness and facial asymmetry.   Psychiatric/Behavioral:  Negative for agitation and  behavioral problems.         Personal History     Past Medical History:   Diagnosis Date    Anxiety     Arthritis     Bladder cancer     Bladder cancer 2010    lining of bladder     Broken bones      as a child     CAD (coronary artery disease) 02/2014    Calculus of kidney     Cataracts, both eyes     CHF (congestive heart failure)     Constipation     Deafness     Depression     Essential hypertension 6/24/2021    Forgetfulness     Gallstones     Head injury     Heart disease     Hemorrhoids     Hernia of pelvic floor     High cholesterol     High cholesterol     History of bladder cancer 6/24/2021    History of heart attack     Hypertension     Kidney stones     Mixed hyperlipidemia 6/24/2021    Primary insomnia 6/24/2021    Shortness of breath     Sinus trouble     Skin disease     UNSURE WHAT KIND/ARMS    Tobacco abuse 6/24/2021       Past Surgical History:   Procedure Laterality Date    ABDOMINAL AORTIC ANEURYSM REPAIR      CAROTID ENDARTERECTOMY Right 02/2014    Dr. Boudreaux    CHOLECYSTECTOMY      CHOLECYSTECTOMY OPEN      CHOLECYSTECTOMY OPEN      CORONARY ARTERY BYPASS GRAFT      CYSTOSCOPY      Cystoscopy, Bladder BX 2004; Cysto, Urethral Dilattion, Bilateral Retrogrades, Biopsy & Fulguration, Left Ureteroscopy 01/17/2013    CYSTOSCOPY RETROGRADE PYELOGRAM Bilateral     EYE SURGERY      KNEE SURGERY Left     Repair    TOTAL HIP ARTHROPLASTY Left        Family History: family history includes Heart disease in an other family member. Otherwise pertinent FHx was reviewed and not pertinent to current issue.    Social History:  reports that he quit smoking about 3 years ago. His smoking use included cigarettes. He started smoking about 53 years ago. He has a 50 pack-year smoking history. He has been exposed to tobacco smoke. He has never used smokeless tobacco. He reports that he does not drink alcohol and does not use drugs.    Home Medications:  ALPRAZolam and metoprolol succinate XL    Allergies:  Allergies  cholesterol                                              Sleep apnea                                                     Comment: C-pap    Tubular adenoma of colon                        07/19/2017    History of dysplastic nevus                     09/14/2017    Aortic valve disease                                          Wears glasses                                                 Past Surgical History    ANGIOPLASTY                                                     Comment: and stenting right coronary and LAD     ------------OTHER-------------                                  Comment: h/o perirectal abcess     FEMUR FRACTURE SURGERY                                        TONSILLECTOMY AND ADENOIDECTOMY                               FUROSEMIDE INJECTION UP TO 20 MG                              PTCA                                                            Comment: 2008 cardiac stents    EYE SURGERY                                                     Comment: laser treatment for retinopathy    FRACTURE SURGERY                                                Comment: rodding to left femur with removal 1985    COLONOSCOPY                                     6/12/2014*    CATH PLACE FOR CORONARY ANGIOGRAPHY W MD SUP -* 07/22/2022    ALLERGIES:   Allergen Reactions   • Bacitracin-Neomycin-Polymyxin [Neomycin-Bacitracin-Polymyxin] RASH     Per cerner   • Polytrim [Polymyxin B-Trimethoprim] RASH     Per Cerner       No current facility-administered medications for this encounter.     Current Outpatient Medications   Medication Sig   • GLUCOSAMINE-CHONDROITIN PO Take 1 tablet by mouth daily.   • lisinopril (ZESTRIL) 20 MG tablet Take 1 tablet by mouth daily.   • metoPROLOL tartrate (LOPRESSOR) 25 MG tablet Take 1 tablet by mouth 2 times daily.   • metformin (GLUCOPHAGE) 1000 MG tablet Take 1 tablet by mouth 2 times daily.   • simvastatin (ZOCOR) 40 MG tablet Take 0.5 tablets by mouth nightly.   • clopidogrel (PLAVIX)    Allergen Reactions    Azithromycin Unknown - High Severity     zpak    Quinolones Other (See Comments)     History of abdominal aortic aneurysm repair    Morphine Unknown - Low Severity    Penicillin G Unknown - Low Severity    Penicillins Unknown - Low Severity    Sulfa Antibiotics Unknown - Low Severity       Objective    Objective     Vitals:   Temp:  [101.8 °F (38.8 °C)] 101.8 °F (38.8 °C)  Heart Rate:  [88-92] 88  Resp:  [58] 58  BP: (111-127)/(78-89) 111/89    Physical Exam  Constitutional:       Appearance: He is normal weight.   HENT:      Head: Normocephalic and atraumatic.      Nose: Nose normal.      Mouth/Throat:      Mouth: Mucous membranes are moist.   Eyes:      Conjunctiva/sclera: Conjunctivae normal.      Pupils: Pupils are equal, round, and reactive to light.   Cardiovascular:      Rate and Rhythm: Normal rate and regular rhythm.      Pulses: Normal pulses.   Pulmonary:      Effort: Pulmonary effort is normal.      Breath sounds: Normal breath sounds.   Abdominal:      General: Abdomen is flat. Bowel sounds are normal.      Palpations: Abdomen is soft.   Skin:     General: Skin is warm and dry.      Capillary Refill: Capillary refill takes less than 2 seconds.   Neurological:      General: No focal deficit present.      Mental Status: He is alert.   Psychiatric:         Mood and Affect: Mood normal.         Behavior: Behavior normal.         Result Review    Result Review:  I have personally reviewed the results from the time of this admission to 1/27/2025 13:28 EST and agree with these findings:  [x]  Laboratory list / accordion  []  Microbiology  [x]  Radiology  []  EKG/Telemetry   []  Cardiology/Vascular   []  Pathology  [x]  Old records  []  Other:  Most notable findings include: Unremarkable exam      Assessment & Plan   Assessment / Plan     Brief Patient Summary:  Cm Flores is a 74 y.o. male who presents with generalized weakness, decreased appetite, myalgias, fevers and chills  75 MG tablet Take 1 tablet by mouth daily.   • fluticasone (FLONASE) 50 MCG/ACT nasal spray Spray 1 spray in each nostril daily. (Patient taking differently: Spray 1 spray in each nostril daily as needed.)   • Multiple Vitamins-Minerals (MULTIVITAMIN PO) Take  by mouth.   • Cinnamon 500 MG Cap    • Ascorbic Acid (VITAMIN C) 500 MG tablet Take 500 mg by mouth daily.       Social History     Tobacco Use   Smoking Status Light Tobacco Smoker   • Packs/day: 0.50   • Years: 18.00   • Pack years: 9.00   • Types: Cigarettes   Smokeless Tobacco Never Used   Tobacco Comment    1/2 pack daily       Social History     Substance and Sexual Activity   Alcohol Use Yes   • Alcohol/week: 6.0 - 8.0 standard drinks   • Types: 6 - 8 Standard drinks or equivalent per week       History   Drug Use No       Occupation:  The patient works as a  with lifting requirements up to 30 lbs.   Living situation:  The patient currently lives with his mother, who is requiring 24 hour care. He shares care responsibilities of mother with sister.     Family History   Problem Relation Age of Onset   • Diabetes Mother    • Heart disease Father    • Dementia/Alzheimers Father    • Cancer Sister         uterine?   • Diabetes Brother      Family history reviewed.  Pertinent cardiac family history:  YES, father with heart disease  Review of Systems:  10 point review of systems negative except for those mentioned above in HPI.   No history of DVT or vein stripping.    Physical Exam:    There were no vitals taken for this visit.  Ht Readings from Last 1 Encounters:   08/10/22 5' 8\" (1.727 m)     Wt Readings from Last 1 Encounters:   08/10/22 83.9 kg (185 lb)     There is no height or weight on file to calculate BMI.    General:  male, no acute distress, well developed and well nourished  Eyes:  Normal sclerae, normal conjunctivae, EOMs intact  Mouth:  Dentition adequate repair, tongue midline, mucous membranes moist  Neck:  No tracheal  deviation/tenderness/masses  Cardiovascular:  Normal S1/S2, +systolic murmur, no rub, and 1+ pedal pulses  Extremities:  trace lower extremity edema, no stasis changes, no varicose veins in bilateral lower extremities and normal strength in bilateral lower extremities   Respiratory:  No wheezing/crackles/rhonchi/stridor and no accessory muscle use or retractions  Abdomen:  No tenderness and no masses, nondistended, positive bowel sounds  Skin:  Warm/dry, no lesions  Neuro:  Alert and oriented x3      Labs:  Lab Results   Component Value Date    SODIUM 133 (L) 08/31/2022    POTASSIUM 5.3 (H) 08/31/2022    CHLORIDE 97 08/31/2022    CO2 28 08/31/2022    GLUCOSE 130 (H) 08/31/2022    BUN 14 08/31/2022    CREATININE 0.74 08/31/2022    CALCIUM 10.9 (H) 08/31/2022    ALBUMIN 4.3 08/31/2022    BILIRUBIN 0.4 08/31/2022    AST 21 08/31/2022    INR 1.0 08/31/2022     Lab Results   Component Value Date    WBC 9.5 08/31/2022    HGB 14.1 08/31/2022    HCT 41.7 08/31/2022     08/31/2022    TSH 0.630 08/31/2022       Diagnostics:  Heart Catheterization 07/22/22  RESULTS:     HEMODYNAMICS: Central aortic pressures were elevated with systolics in the 150s     CORONARY ANGIOGRAM:  1. Left main coronary artery: This bifurcates into the left anterior descending and left circumflex coronary arteries. Mild distal left main stenosis.  2. Left anterior descending coronary artery: This gives rise to diagonals and septal perforators, wraps around the apex. Stent in the proximal portion with mild InStent restenoses  3. Left circumflex coronary artery: Nondominant vessel. No significant stenosis  4. Right coronary artery: Dominant vessel. Long area of stenting of most of the mid segment as well as the distal RCA, proximal portion of the stent with mild InStent restenoses. Ostial PDA with 50% stenosis    EKG 07/29/22  Sinus bradycardia   with sinus arrhythmia   Left ventricular hypertrophy   with repolarization abnormality     Chest Xray  generally failure to thrive picture.  In the ED flu positive    Active Hospital Problems:  Active Hospital Problems    Diagnosis     **Hypoxic respiratory failure    Influenza A infection  History of COPD  History of hypertension  Thrombocytopenia  ROBE on CKD  Hyponatremia    Plan:   Admit patient to [facility] telemetry  Follow-up blood cultures, procalcitonin  Patient is persistently febrile in the ED despite Tylenol and blood pressures are on the soft side  Start azithromycin 500 mg daily and Rocephin 1 g IV daily  Continue breathing treatments with DuoNebs  Solu-Medrol 40 mg IV daily  Continue supplemental oxygen to maintain saturation above 90%  Encourage oral intake  Reconcile and restart home medications when appropriate  IV fluid rehydration    VTE Prophylaxis:  Pharmacologic VTE prophylaxis orders are present.        CODE STATUS:    Code Status (Patient has no pulse and is not breathing): CPR (Attempt to Resuscitate)  Medical Interventions (Patient has pulse or is breathing): Full Support    Admission Status:  I believe this patient meets inpatient status.    Cornelio Rosenthal MD   07/05/22  1. No consolidation or effusion. Mild scarring in the lungs.  2. In interval, there are multiple healed anterior left rib fractures.  3. Marked coronary artery calcifications with coronary stents noted.    TTE 06/13/22  Normal left ventricular size and systolic function, EF 63 %.  LV Global longitudinal strain -20.0 %.  Mild increased left ventricular wall thickness.  Grade II diastolic dysfunction of the left ventricle (pseudonormal filling pattern).  Normal right ventricular size and systolic function.  Severely increased left atrial chamber size.  Normal right atrial chamber size.  Very severe aortic valve (V max 5.4 m/sec, mean gradient 59 mmHg, DVI 0.2).  Mitral annular calcification , mild mitral valve regurgitation.  Mild tricuspid valve regurgitation.  Right ventricular systolic pressure; 46 mmHg.  Mildly dilated ascending aorta 4 cm (root 3.9 cm, arch 3.4 cm).  No pericardial effusion.  Compared to prior study, aortic valve V max has increased from 4.5-5.4.    TTE 11/22/21  Normal left ventricular systolic function. Left ventricular ejection fraction, 54 %.  LV Global longitudinal strain 19  % (normal).  Mildly increased left ventricular cavity size. Mildly increased left ventricular wall thickness.  Grade I/IV diastolic dysfunction (abnormal relaxation filling pattern), normal to mildly elevated filling pressures.  Normal right ventricular size and systolic function.  Severely increased left atrial size.  Normal right atrial size.  Severe aortic valve stenosis (V max 4.5 m/sec, mean gradient 43 mmHg, DVI 0.3).  Trace tricuspid valve regurgitation.  Mild pulmonary valve regurgitation.  RVSP could not be calculated due to incomplete tricuspid regurgitation velocity profile.  Dilated ascending aorta 4 cm.  Compared to prior study aortic valve V max is increased from 4.24.5  m/sec    Carotid U/S 08/31/2022  IMPRESSION:   1. Right internal carotid artery:  Moderate carotid bulb plaque.  (<50%)  using  SRU velocity criteria. Though, grayscale imaging does raise the  possibility this may be near or even slightly greater than 50%.     2. Left internal carotid artery:  Moderate plaque. (<50%) using SRU  criteria.      3. Anterograde vertebral arteries.      4. Single solid 2.4 cm thyroid nodule. Dedicated thyroid ultrasound is strongly recommended, and biopsy would likely also be appropriate.    STS Risk Calculator:  Risk of mortality: 0.989%    Fraility Index  2 - well    New York Heart Association Classification  Class II: Comfortable at rest, symptomatic with ordinary physical activity    CHADSVASC  CHADSVASc Stroke Risk Score = 4    Impression/Plan:  Brendon Alvarez is a 63 year old male with known aortic stenosis. Other past medical history include CAD s/p JADE to RCA 2008 and JADE to LAD 2009) hypertension, hyperlipidemia, diabetes type 2, nicotine dependence and MARTHA with CPAP use. Complaints of fatigue and dyspnea with exertion. Routine TTE demonstrating severe aortic stenosis with mean gradient 59 mmHg, JENA 1.3 cm2 and peak velocity 5.4 m/s. Mild dilation of the ascending aorta 4 cm. LVEF  63 % with mild increased LV wall thickness and severely increased LA size. RVSP 46 mmHg. Subsequent heart catheterization demonstrating left main stenosis, in stent restenosis of the LAD, in stent restenosis of the RCA and PDA stenosis.     He agrees to undergo aortic valve replacement (tissue) and myocardial revascularization with endoscopic vein harvest. He understands that another intervention will likely be required on his valve in the future with a tissue valve.    Patient does  wish to receive blood products if necessary      Terri Castro PA-C  9/11/2022

## 2025-01-27 NOTE — PAYOR COMM NOTE
"Cm Flores (74 y.o. Male)     PATIENT INFORMATION  Name:  Cm Flores  MRN#:     [id number]  :  1950         ADMISSION INFORMATION  CLASS: Inpatient   DOS:  2025    CURRENT ATTENDING PROVIDER INFORMATION  Name/NPI: Cornelio Rosenthal MD [[id number]]  Phone:  Phone: [phone]  Fax:  [phone]    REQUESTING PROVIDER and RENDERING FACILITY  Name:  [facility]   NPI:  [id number]  TID:  [id number]  Address:      [facility] [address] [place] [address]  Phone:               [phone]  Fax:  [phone]      UTILIZATION REVIEW CONTACT INFORMATION  Phone:      [phone]  Fax:           [phone]      ADMISSION DIAGNOSIS  Shortness of breath [R06.02]  Influenza A [J10.1]  Dehydration with hyponatremia [E86.0, E87.1]  Hypoxic respiratory failure [J96.91]    ++++++++++++++++++++++++++++++++++++++++++++++++++++++++++++++++++++++++++++++++        Date of Birth   1950    Social Security Number       Address   [address] [place] [address]    Home Phone   [phone]    MRN   [id number]       Restoration   Confucianism    Marital Status   Single                            Admission Date   25    Admission Type   Emergency    Admitting Provider   Cornelio Rosenthal MD    Attending Provider   Cornelio Rosenthal MD    Department, Room/Bed   [facility] 4TH FLOOR MEDICAL TELEMETRY UNIT,        Discharge Date       Discharge Disposition       Discharge Destination                                 Attending Provider: Cornelio Rosenthal MD    Allergies: Azithromycin, Quinolones, Morphine, Penicillin G, Penicillins, Sulfa Antibiotics    Isolation: Contact Air, Droplet   Infection: Influenza (25)   Code Status: CPR    Ht: 182.9 cm (72\")   Wt: 59.8 kg (131 lb 13.4 oz)    Admission Cmt: None   Principal Problem: Hypoxic respiratory failure [J96.91]                   Active Insurance as of 2025       Primary Coverage       Payor Plan Insurance Group " Employer/Plan Group    HUMANA MEDICARE REPLACEMENT HU[employer] MED ADV GROUP [id number]       Payor Plan Address Payor Plan Phone Number Payor Plan Fax Number Effective Dates    [address] [address] [phone]  1/1/2022 - None Entered    [facility] [id number]         Subscriber Name Subscriber Birth Date Member ID       DON NELSON 1950 [id number]                      Viral Illness, Acute [facility] Inpatient Care       Indications Met   Last updated by Rukhsana uHrt RN on 1/27/2025 9751     Review Status Created By   Primary Completed Rukhsana Hurt RN      Criteria Review   Viral Illness, Acute RRG Inpatient Care     Overall Determination: Indications Met     Criteria:  [×] Admission is indicated for  1 or more  of the following  [F]:      [×] Pulmonary manifestation, as indicated by  1 or more  of the following :          [×] Hypoxemia              1/27/2025  3:59 PM                  -- 1/27/2025  3:59 PM by Rukhsana Hurt RN --                                            (X) Hypoxemia, as indicated by  1 or more  of the following  (1):                      (X) Patient without baseline need for supplemental oxygen with oxygen saturation (SaO2) of less than 90% or arterial blood gas partial pressure of oxygen (PO2) of less than 60 mm Hg (8.0 kPa) on room air [A] [B]                      (X) Patient without baseline need for supplemental oxygen who now requires supplemental oxygen to keep SaO2 greater than 89% or PO2 greater than 59 mm Hg (7.9 kPa) [A]              1/27/2025  3:59 PM                  -- 1/27/2025  3:59 PM by Rukhsana Hurt, HAM --                      Sats 88-89% on RA. Requires O2 2L to keep > 90%      [×] Systemic manifestation, as indicated by  1 or more  of the following :          [×] Hemodynamic instability              1/27/2025  3:59 PM                  -- 1/27/2025  3:59 PM by Rukhsana Hurt RN --                                            (X) Hemodynamic instability, as indicated by  1 or more  of the  following  (1) (2) (3) (4) (5) (6):                      (X) Vital sign abnormality not readily corrected by appropriate treatment, as indicated by  1 or more  of the following  [A]:                      (X) Hypotension that persists despite appropriate treatment (eg, volume repletion, treatment of underlying cause)              2025  3:59 PM                  -- 2025  3:59 PM by Rukhsana Hurt, RN --                      BP 80/63-89/65, despite IV NS 1000ml bolus     Notes:  -- 2025  3:59 PM by Rukhsana Hurt, RN --      Subject: Admission      To ED c/o cough/chest congestion. + fever. + SOA.       Temp 101.8, Resp 28-58, BP 80/63-89/65, Sats 88-89% on RA.                   PMHx: CHF, CAD, HLD, HTN, AAA repair. R CEA. L NOE.                   ED Results:                   CXR:  Left lower lung volume loss and trace left pleural effusion.                  Influenza A +      Creatinine 1.72 (baseline 1.3-1.6), GFR 41.5.       Na+ 125      Trops 29 & 27, BNP 9098.0      WBC ok      Platelets 68 (baseline 113-130)                  In ED:      O2 2L NC.       IV NS 2000ml bolus,      Tylenol PO.      Tamiflu PO                  Admit:       Telemetry      O2      BC x2      IV NS @125/hr      Rocephin IV qd      Doxycycline IV q12h      Duonebs qid      Zofran IV prn      Lovenox SQ      Labs in AM.                 History & Physical        Cornelio Rosenthal MD at [address] [place] [address]   HISTORY AND PHYSICAL    Patient Name: Cm Flores  : 1950  MRN: [id number]  Primary Care Physician:  Nancy Yeh DO  Date of admission: 2025    Subjective  Subjective     Chief Complaint: Shortness of breath    History of Present Illness  Patient is a 74-year-old gentleman with past medical history of COPD, hypertension, who presents to the emergency room with chief complaints of shortness of breath, generalized weakness, poor appetite, muscle aches, and failure to thrive for the past  couple of days.  Reports subjective fevers and chills at home.  Denies any recent travels or sick contacts.  Denies chest pain, chest tightness, palpitations, nausea, vomiting, diarrhea, or any other symptoms.  When patient presented to the ED, her vital signs were significant for temperature 101.8.  Labs were significant for creatinine 1.72, sodium 125, platelet of 68, proBNP of 9098, and influenza A PCR swab positive.  Of note COVID was negative.  Chest x-ray trace left pleural effusion chronic 3 cm left hilar mass.  Patient received 1 L of IV fluid rehydration in the ED, [facility] and he is being admitted for further management  Shortness of Breath  Associated symptoms include a fever. Pertinent negatives include no abdominal pain, chest pain or leg swelling.       Review of Systems   Constitutional:  Positive for appetite change, chills, fatigue and fever.   Respiratory:  Positive for shortness of breath. Negative for cough and choking.    Cardiovascular:  Negative for chest pain, palpitations and leg swelling.   Gastrointestinal:  Negative for abdominal distention and abdominal pain.   Endocrine: Negative for cold intolerance and heat intolerance.   Genitourinary:  Negative for dysuria and hematuria.   Musculoskeletal:  Positive for myalgias. Negative for back pain and gait problem.   Neurological:  Negative for dizziness and facial asymmetry.   Psychiatric/Behavioral:  Negative for agitation and behavioral problems.         Personal History     Past Medical History:   Diagnosis Date    Anxiety     Arthritis     Bladder cancer     Bladder cancer 2010    lining of bladder     Broken bones      as a child     CAD (coronary artery disease) 02/2014    Calculus of kidney     Cataracts, both eyes     CHF (congestive heart failure)     Constipation     Deafness     Depression     Essential hypertension 6/24/2021    Forgetfulness     Gallstones     Head injury     Heart disease     Hemorrhoids     Hernia of pelvic floor      High cholesterol     High cholesterol     History of bladder cancer 6/24/2021    History of heart attack     Hypertension     Kidney stones     Mixed hyperlipidemia 6/24/2021    Primary insomnia 6/24/2021    Shortness of breath     Sinus trouble     Skin disease     UNSURE WHAT KIND/ARMS    Tobacco abuse 6/24/2021       Past Surgical History:   Procedure Laterality Date    ABDOMINAL AORTIC ANEURYSM REPAIR      CAROTID ENDARTERECTOMY Right 02/2014    Dr. Boudreaux    CHOLECYSTECTOMY      CHOLECYSTECTOMY OPEN      CHOLECYSTECTOMY OPEN      CORONARY ARTERY BYPASS GRAFT      CYSTOSCOPY      Cystoscopy, Bladder BX 2004; Cysto, Urethral Dilattion, Bilateral Retrogrades, Biopsy & Fulguration, Left Ureteroscopy 01/17/2013    CYSTOSCOPY RETROGRADE PYELOGRAM Bilateral     EYE SURGERY      KNEE SURGERY Left     Repair    TOTAL HIP ARTHROPLASTY Left        Family History: family history includes Heart disease in an other family member. Otherwise pertinent FHx was reviewed and not pertinent to current issue.    Social History:  reports that he quit smoking about 3 years ago. His smoking use included cigarettes. He started smoking about 53 years ago. He has a 50 pack-year smoking history. He has been exposed to tobacco smoke. He has never used smokeless tobacco. He reports that he does not drink alcohol and does not use drugs.    Home Medications:  ALPRAZolam and metoprolol succinate XL    Allergies:  Allergies   Allergen Reactions    Azithromycin Unknown - High Severity     zpak    Quinolones Other (See Comments)     History of abdominal aortic aneurysm repair    Morphine Unknown - Low Severity    Penicillin G Unknown - Low Severity    Penicillins Unknown - Low Severity    Sulfa Antibiotics Unknown - Low Severity       Objective   Objective     Vitals:   Temp:  [101.8 °F (38.8 °C)] 101.8 °F (38.8 °C)  Heart Rate:  [88-92] 88  Resp:  [58] 58  BP: (111-127)/(78-89) 111/89    Physical Exam  Constitutional:       Appearance: He is  normal weight.   HENT:      Head: Normocephalic and atraumatic.      Nose: Nose normal.      Mouth/Throat:      Mouth: Mucous membranes are moist.   Eyes:      Conjunctiva/sclera: Conjunctivae normal.      Pupils: Pupils are equal, round, and reactive to light.   Cardiovascular:      Rate and Rhythm: Normal rate and regular rhythm.      Pulses: Normal pulses.   Pulmonary:      Effort: Pulmonary effort is normal.      Breath sounds: Normal breath sounds.   Abdominal:      General: Abdomen is flat. Bowel sounds are normal.      Palpations: Abdomen is soft.   Skin:     General: Skin is warm and dry.      Capillary Refill: Capillary refill takes less than 2 seconds.   Neurological:      General: No focal deficit present.      Mental Status: He is alert.   Psychiatric:         Mood and Affect: Mood normal.         Behavior: Behavior normal.         Result Review   Result Review:  I have personally reviewed the results from the time of this admission to 1/27/2025 13:28 EST and agree with these findings:  [x]  Laboratory list / accordion  []  Microbiology  [x]  Radiology  []  EKG/Telemetry   []  Cardiology/Vascular   []  Pathology  [x]  Old records  []  Other:  Most notable findings include: Unremarkable exam      Assessment & Plan  Assessment / Plan     Brief Patient Summary:  Cm Flores is a 74 y.o. male who presents with generalized weakness, decreased appetite, myalgias, fevers and chills generally failure to thrive picture.  In the ED flu positive    Active Hospital Problems:  Active Hospital Problems    Diagnosis     **Hypoxic respiratory failure    Influenza A infection  History of COPD  History of hypertension  Thrombocytopenia  ROBE on CKD  Hyponatremia    Plan:   Admit patient to [facility] telemetry  Follow-up blood cultures, procalcitonin  Patient is persistently febrile in the ED despite Tylenol and blood pressures are on the soft side  Start azithromycin 500 mg daily and Rocephin 1 g IV daily  Continue  breathing treatments with DuoNebs  Solu-Medrol 40 mg IV daily  Continue supplemental oxygen to maintain saturation above 90%  Encourage oral intake  Reconcile and restart home medications when appropriate  IV fluid rehydration    VTE Prophylaxis:  Pharmacologic VTE prophylaxis orders are present.        CODE STATUS:    Code Status (Patient has no pulse and is not breathing): CPR (Attempt to Resuscitate)  Medical Interventions (Patient has pulse or is breathing): Full Support    Admission Status:  I believe this patient meets inpatient status.    Cornelio Rosenthal MD    Electronically signed by Cornelio Rosenthal MD at 01/27/25 9069          Emergency Department Notes        Jhon Burks MD at 01/27/25 1157          Time: 11:54 AM EST  Date of encounter:  1/27/2025  Independent Historian/Clinical History and Information was obtained by:   Patient and Family    History is limited by: Dementia, Altered Mental Status    Chief Complaint: Fever, shortness of breath, cough and congestion      History of Present Illness:  Patient is a 74 y.o. year old male with history of CAD and CHF and tobacco abuse who presents to the emergency department for evaluation of acute onset of illness with cough and chest congestion, mostly clear sputum production, fever of 101.8 Fahrenheit and looks dyspneic on arrival.    He is oxygenating in the 90 to 91% range on room air and was placed on 2 L of oxygen.    Denies any sick contacts or history of pneumonia but overall appears to be a poor historian.    EMS already gave him some DuoNeb breathing treatments prior to arrival.      Patient Care Team  Primary Care Provider: Nancy Yeh DO    Past Medical History:     Allergies   Allergen Reactions    Azithromycin Unknown - High Severity     zpak    Quinolones Other (See Comments)     History of abdominal aortic aneurysm repair    Morphine Unknown - Low Severity    Penicillin G Unknown - Low Severity    Penicillins Unknown - Low Severity     Sulfa Antibiotics Unknown - Low Severity     Past Medical History:   Diagnosis Date    Anxiety     Arthritis     Bladder cancer     Bladder cancer 2010    lining of bladder     Broken bones      as a child     CAD (coronary artery disease) 02/2014    Calculus of kidney     Cataracts, both eyes     CHF (congestive heart failure)     Constipation     Deafness     Depression     Essential hypertension 6/24/2021    Forgetfulness     Gallstones     Head injury     Heart disease     Hemorrhoids     Hernia of pelvic floor     High cholesterol     High cholesterol     History of bladder cancer 6/24/2021    History of heart attack     Hypertension     Kidney stones     Mixed hyperlipidemia 6/24/2021    Primary insomnia 6/24/2021    Shortness of breath     Sinus trouble     Skin disease     UNSURE WHAT KIND/ARMS    Tobacco abuse 6/24/2021     Past Surgical History:   Procedure Laterality Date    ABDOMINAL AORTIC ANEURYSM REPAIR      CAROTID ENDARTERECTOMY Right 02/2014    Dr. Boudreaux    CHOLECYSTECTOMY      CHOLECYSTECTOMY OPEN      CHOLECYSTECTOMY OPEN      CORONARY ARTERY BYPASS GRAFT      CYSTOSCOPY      Cystoscopy, Bladder BX 2004; Cysto, Urethral Dilattion, Bilateral Retrogrades, Biopsy & Fulguration, Left Ureteroscopy 01/17/2013    CYSTOSCOPY RETROGRADE PYELOGRAM Bilateral     EYE SURGERY      KNEE SURGERY Left     Repair    TOTAL HIP ARTHROPLASTY Left      Family History   Problem Relation Age of Onset    Heart disease Other        Home Medications:  Prior to Admission medications    Medication Sig Start Date End Date Taking? Authorizing Provider   albuterol (ACCUNEB) 1.25 MG/3ML nebulizer solution Take 3 mL by nebulization Every 6 (Six) Hours As Needed for Shortness of Air. 6/24/24   Henny Shelton MD   albuterol sulfate  (90 Base) MCG/ACT inhaler Inhale 2 puffs Every 4 (Four) Hours As Needed for Wheezing. 6/25/24   Mariel Saldana APRN   ALPRAZolam (XANAX) 0.25 MG tablet Take 1 tablet by mouth At  "Night As Needed for Anxiety. 10/10/24   Nancy Yeh DO   ASPIRIN 81 PO Take  by mouth.    Provider, MD Jose Antonio   cetirizine (zyrTEC) 10 MG tablet Take 1 tablet by mouth Daily. 1/23/24   Nancy Yeh DO   metoprolol succinate XL (TOPROL-XL) 25 MG 24 hr tablet Take 1 tablet by mouth Daily. 12/2/24   Nancy Yeh DO   rosuvastatin (CRESTOR) 5 MG tablet Take 1 tablet by mouth Daily. 1/23/24   Nancy Yeh DO        Social History:   Social History     Tobacco Use    Smoking status: Former     Current packs/day: 0.00     Average packs/day: 1 pack/day for 50.0 years (50.0 ttl pk-yrs)     Types: Cigarettes     Start date: 1/1/1972     Quit date: 10/1/2021     Years since quitting: 3.3     Passive exposure: Past    Smokeless tobacco: Never    Tobacco comments:     smokes a average of 1ppw, no second hand smoke exposure now .   Vaping Use    Vaping status: Never Used   Substance Use Topics    Alcohol use: Never    Drug use: Never         Review of Systems:  Review of Systems   I performed a 10 point review of systems which was all negative, except for the positives found in the HPI above.  Physical Exam:  /89   Pulse 88   Temp (!) 101.8 °F (38.8 °C) (Oral)   Resp (!) 58   Ht 182.9 cm (72\")   Wt 59.8 kg (131 lb 13.4 oz)   SpO2 95%   BMI 17.88 kg/m²     Physical Exam   General: Awake alert and in mild to moderate respiratory distress, cough and congestion, feels hot to the touch    HEENT: Head normocephalic atraumatic, eyes PERRLA EOMI, nose normal, oropharynx normal.  Mucous membranes look somewhat dry.    Neck: Supple full range of motion, no meningismus, no lymphadenopathy    Heart: Regular rate and rhythm, no murmurs or rubs, 2+ radial pulses bilaterally    Lungs: Mild to moderate respiratory distress with tachypnea and increased work of breathing, minimal wheezes, some rhonchi noted, no crackles    Abdomen: Soft, nontender, nondistended, no rebound or guarding    Skin: Warm, dry, no " rash    Musculoskeletal: Normal range of motion, no lower extremity edema    Neurologic: Oriented x3, no motor deficits no sensory deficits    Psychiatric: Mood appears stable, no psychosis            Medical Decision Making:      Comorbidities that affect care:    Congestive Heart Failure, Coronary Artery Disease, Smoking    External Notes reviewed:    Previous Clinic Note: I reviewed his previous clinic visit at urgent care 7 months ago where he was diagnosed with right lower lobe pneumonia.      The following orders were placed and all results were independently analyzed by me:  Orders Placed This Encounter   Procedures    COVID-19, FLU A/B, RSV PCR 1 HR TAT - Swab, Nasopharynx    Blood Culture - Blood,    Blood Culture - Blood,    XR Chest 1 View    [place] Draw    Comprehensive Metabolic Panel    BNP    High Sensitivity Troponin T    CBC Auto Differential    Lactic Acid, Plasma    Scan Slide    High Sensitivity Troponin T 1Hr    NPO Diet NPO Type: Strict NPO    Undress & Gown    Continuous Pulse Oximetry    Vital Signs    Hospitalist (on-call MD unless specified)    Oxygen Therapy- Nasal Cannula; Titrate 1-6 LPM Per SpO2; 90 - 95%    ECG 12 Lead ED Triage Standing Order; SOA    Insert Peripheral IV    CBC & Differential    Green Top (Gel)    Lavender Top    Gold Top - SST    Light Blue Top       Medications Given in the Emergency Department:  Medications   sodium chloride 0.9 % flush 10 mL (has no administration in time range)   oseltamivir (TAMIFLU) capsule 75 mg (has no administration in time range)   sodium chloride 0.9 % bolus 1,000 mL (has no administration in time range)   acetaminophen (TYLENOL) tablet 975 mg (975 mg Oral Given 1/27/25 1209)        ED Course:    ED Course as of 01/27/25 1313   Mon Jan 27, 2025   1158 EKG: I interpreted his twelve-lead EKG as normal sinus rhythm at 94 bpm, normal P waves, normal QRS, normal ST segments, borderline T wave abnormalities in the anterolateral leads but EKG  baseline artifact noted. [VS]      ED Course User Index  [VS] Jhon Burks MD       Labs:    Lab Results (last 24 hours)       Procedure Component Value Units Date/Time    CBC & Differential [[id number]]  (Abnormal) Collected: 01/27/25 1138    Specimen: Blood Updated: 01/27/25 1219    Narrative:      The following orders were created for panel order CBC & Differential.  Procedure                               Abnormality         Status                     ---------                               -----------         ------                     CBC Auto Differential[[id number]]        Abnormal            Final result               Scan Slide[[id number]]                                       Final result                 Please view results for these tests on the individual orders.    Comprehensive Metabolic Panel [[id number]]  (Abnormal) Collected: 01/27/25 1138    Specimen: Blood Updated: 01/27/25 1235     Glucose 90 mg/dL      BUN 23 mg/dL      Creatinine 1.72 mg/dL      Sodium 125 mmol/L      Potassium 4.6 mmol/L      Chloride 88 mmol/L      CO2 24.8 mmol/L      Calcium 8.4 mg/dL      Total Protein 6.7 g/dL      Albumin 4.1 g/dL      ALT (SGPT) 5 U/L      AST (SGOT) 26 U/L      Alkaline Phosphatase 53 U/L      Total Bilirubin 1.2 mg/dL      Globulin 2.6 gm/dL      A/G Ratio 1.6 g/dL      BUN/Creatinine Ratio 13.4     Anion Gap 12.2 mmol/L      eGFR 41.5 mL/min/1.73     Narrative:      GFR Categories in Chronic Kidney Disease (CKD)      GFR Category          GFR (mL/min/1.73)    Interpretation  G1                     90 or greater         Normal or high (1)  G2                      60-89                Mild decrease (1)  G3a                   45-59                Mild to moderate decrease  G3b                   30-44                Moderate to severe decrease  G4                    15-29                Severe decrease  G5                    14 or less           Kidney failure          (1)In the absence of evidence of  kidney disease, neither GFR category G1 or G2 fulfill the criteria for CKD.    eGFR calculation 2021 CKD-EPI creatinine equation, which does not include race as a factor    BNP [[id number]]  (Abnormal) Collected: 01/27/25 1138    Specimen: Blood Updated: 01/27/25 1215     proBNP 9,098.0 pg/mL     Narrative:      This assay is used as an aid in the diagnosis of individuals suspected of having heart failure. It can be used as an aid in the diagnosis of acute decompensated heart failure (ADHF) in patients presenting with signs and symptoms of ADHF to the emergency department (ED). In addition, NT-proBNP of <300 pg/mL indicates ADHF is not likely.    Age Range Result Interpretation  NT-proBNP Concentration (pg/mL:      <50             Positive            >450                   Gray                 300-450                    Negative             <300    50-75           Positive            >900                  Gray                300-900                  Negative            <300      >75             Positive            >1800                  Gray                300-1800                  Negative            <300    High Sensitivity Troponin T [[id number]]  (Abnormal) Collected: 01/27/25 1138    Specimen: Blood Updated: 01/27/25 1220     HS Troponin T 29 ng/L     Narrative:      High Sensitive Troponin T Reference Range:  <14.0 ng/L- Negative Female for AMI  <22.0 ng/L- Negative Male for AMI  >=14 - Abnormal Female indicating possible myocardial injury.  >=22 - Abnormal Male indicating possible myocardial injury.   Clinicians would have to utilize clinical acumen, EKG, Troponin, and serial changes to determine if it is an Acute Myocardial Infarction or myocardial injury due to an underlying chronic condition.         CBC Auto Differential [[id number]]  (Abnormal) Collected: 01/27/25 1138    Specimen: Blood Updated: 01/27/25 1219     WBC 3.70 10*3/mm3      RBC 4.68 10*6/mm3      Hemoglobin 14.5 g/dL      Hematocrit 41.8 %       MCV 89.3 fL      MCH 31.0 pg      MCHC 34.7 g/dL      RDW 14.4 %      RDW-SD 46.4 fl      MPV 9.7 fL      Platelets 68 10*3/mm3      Neutrophil % 54.6 %      Lymphocyte % 32.4 %      Monocyte % 11.4 %      Eosinophil % 0.3 %      Basophil % 0.5 %      Immature Grans % 0.8 %      Neutrophils, Absolute 2.02 10*3/mm3      Lymphocytes, Absolute 1.20 10*3/mm3      Monocytes, Absolute 0.42 10*3/mm3      Eosinophils, Absolute 0.01 10*3/mm3      Basophils, Absolute 0.02 10*3/mm3      Immature Grans, Absolute 0.03 10*3/mm3      nRBC 0.0 /100 WBC     Scan Slide [[id number]] Collected: 01/27/25 1138    Specimen: Blood Updated: 01/27/25 1219     Polychromasia Slight/1+     WBC Morphology Normal     Platelet Estimate Decreased    COVID-19, FLU A/B, RSV PCR 1 HR TAT - Swab, Nasopharynx [[id number]]  (Abnormal) Collected: 01/27/25 1139    Specimen: Swab from Nasopharynx Updated: 01/27/25 1233     COVID19 Not Detected     Influenza A PCR Detected     Influenza B PCR Not Detected     RSV, PCR Not Detected    Narrative:      Fact sheet for providers: https://www.fda.gov/media/463570/download    Fact sheet for patients: https://www.fda.gov/media/467094/download    Test performed by PCR.    Blood Culture - Blood, Arm, Left [[id number]] Collected: 01/27/25 1139    Specimen: Blood from Arm, Left Updated: 01/27/25 1148    Lactic Acid, Plasma [[id number]]  (Normal) Collected: 01/27/25 1139    Specimen: Blood Updated: 01/27/25 1210     Lactate 1.3 mmol/L     Blood Culture - Blood, Arm, Right [[id number]] Collected: 01/27/25 1144    Specimen: Blood from Arm, Right Updated: 01/27/25 1147             Imaging:    XR Chest 1 View    Result Date: 1/27/2025  XR CHEST 1 VW Date of Exam: 1/27/2025 12:05 PM EST Indication: SOA Triage Protocol Comparison: Chest x-ray 6/24/2024 Findings: The heart is stable in size. Median sternotomy wires are noted. 3 cm left hilar mass appears unchanged. There is left lower lung volume loss with blunting of the  costophrenic angle compatible with trace effusion. No evidence for pneumothorax. No focal consolidation.     Impression: Left lower lung volume loss and trace left pleural effusion. Stable appearance to a 3 cm left hilar mass. Electronically Signed: Carlene Arroyo MD  1/27/2025 12:16 PM EST  Workstation ID: [id number]       Differential Diagnosis and Discussion:    Cough: Differential diagnosis includes but is not limited to pneumonia, acute bronchitis, upper respiratory infection, ACE inhibitor use, allergic reaction, epiglottitis, seasonal allergies, chemical irritants, exercise-induced asthma, viral syndrome.  Fever: Based on the complaint of fever, differential diagnosis includes but is not limited to meningitis, pneumonia, pyelonephritis, acute uti,  systemic immune response syndrome, sepsis, viral syndrome, fungal infection, tick born illness and other bacterial infections.    PROCEDURES:    Labs were collected in the emergency department and all labs were reviewed and interpreted by me.  X-ray were performed in the emergency department and all X-ray impressions were independently interpreted by me.  An EKG was performed and the EKG was interpreted by me.    ECG 12 Lead ED Triage Standing Order; SOA   Preliminary Result   HEART RATE=94  bpm   RR Hradxndr=030  ms   WA Gsbwsmtq=642  ms   P Horizontal Axis=34  deg   P Front Axis=76  deg   QRSD Interval=92  ms   QT Jfvkrivq=588  ms   CHnW=179  ms   QRS Axis=58  deg   T Wave Axis=61  deg   - ABNORMAL ECG -   Sinus rhythm   Prolonged WA interval   Date and Time of Study:2025-01-27 11:22:31          Procedures    MDM           This patient is a 74-year-old male with history of tobacco abuse and heart disease presenting with fever, productive cough and short of breath.    He is satting about 91% on room air and looks tachypneic here and has already had multiple DuoNeb breathing treatments at home and with EMS.    We placed him on supplemental oxygen, swabbing him for  COVID and flu and RSV virus, getting blood work and chest x-ray to look for sepsis or pneumonia.      I reviewed his chest x-ray image, interpreting it as showing previous median sternotomy and elevated left hemidiaphragm and opacity seen in the left perihilar area but I do not see any obvious infiltrates or any edema.    He looks weak and dehydrated and still dyspneic, like he will need to be admitted to the hospital.      He ends up testing positive for flu A.  Lab work reflects hyponatremia and hypochloremia and I am giving him some IV normal saline here.  He is on 2 L of oxygen and it looks like he will need to be admitted at this time.                      Patient Care Considerations:    ANTIBIOTICS: I considered prescribing antibiotics as an outpatient however no bacterial focus of infection was found.      Consultants/Shared Management Plan:    Hospitalist: I have discussed the case with admitting hospitalist who agrees to accept the patient for admission.    Social Determinants of Health:    Patient has presented with family members who are responsible, reliable and will ensure follow up care.      Disposition and Care Coordination:    Admit:   Through independent evaluation of the patient's history, physical, and imperical data, the patient meets criteria for inpatient admission to the hospital.        Final diagnoses:   Influenza A   Dehydration with hyponatremia   Shortness of breath        ED Disposition       ED Disposition   Decision to Admit    Condition   --    Comment   --               This medical record created using voice recognition software.             Jhon Burks MD  01/27/25 1313      Electronically signed by Jhon Burks MD at 01/27/25 1313       Vital Signs (last day)       Date/Time Temp Temp src Pulse Resp BP Patient Position SpO2    01/27/25 1500 -- -- 74 -- 81/58 -- 95    01/27/25 1430 -- -- 73 -- -- -- 99    01/27/25 13:33:20 101.3 (38.5) Oral 81 28 89/65 Lying 95    01/27/25  1328 -- -- 81 -- 89/65 -- 97    01/27/25 1300 -- -- 85 -- 80/63 -- 95    01/27/25 1250 -- -- 87 -- -- -- 89    01/27/25 1245 -- -- 88 -- -- -- 89    01/27/25 1230 -- -- 88 -- -- -- 95    01/27/25 1200 -- -- 92 -- 111/89 -- 90    01/27/25 1150 -- -- 93 -- -- -- 88    01/27/25 1117 101.8 (38.8) Oral 91 58 127/78 Lying 91          Facility-Administered Medications as of 1/27/2025   Medication Dose Route Frequency Provider Last Rate Last Admin    acetaminophen (TYLENOL) tablet 650 mg  650 mg Oral Q4H PRN Cornelio Rosenthal MD        [COMPLETED] acetaminophen (TYLENOL) tablet 975 mg  975 mg Oral Once Jhon Burks MD   975 mg at 01/27/25 1209    sennosides-docusate (PERICOLACE) 8.6-50 MG per tablet 2 tablet  2 tablet Oral BID PRN Cornelio Rosenthal MD        And    polyethylene glycol (MIRALAX) packet 17 g  17 g Oral Daily PRN Cornelio Rosenthal MD        And    bisacodyl (DULCOLAX) EC tablet 5 mg  5 mg Oral Daily PRN Cornelio Rosenthal MD        And    bisacodyl (DULCOLAX) suppository 10 mg  10 mg Rectal Daily PRN Cornelio Rosenthal MD        cefTRIAXone (ROCEPHIN) in NS 1 gram/10ml IV PUSH syringe  1,000 mg Intravenous Q24H Cornelio Rosenthal MD        doxycycline (VIBRAMYCIN) 100 mg in sodium chloride 0.9 % 100 mL IVPB-VTB  100 mg Intravenous Q12H Cornelio Rosenthal MD        Enoxaparin Sodium (LOVENOX) syringe 40 mg  40 mg Subcutaneous Q24H Cornelio Rosenthal MD        ibuprofen (ADVIL,MOTRIN) tablet 600 mg  600 mg Oral Once Cornelio Rosenthal MD        ipratropium-albuterol (DUO-NEB) nebulizer solution 3 mL  3 mL Nebulization 4x Daily - RT Cornelio Rosenthal MD        nitroglycerin (NITROSTAT) SL tablet 0.4 mg  0.4 mg Sublingual Q5 Min PRN Cornelio Rosenthal MD        ondansetron (ZOFRAN) injection 4 mg  4 mg Intravenous Q6H PRN Cornelio Rosenthal MD        [COMPLETED] oseltamivir (TAMIFLU) capsule 75 mg  75 mg Oral Once Jhon Burks MD   75 mg at 01/27/25 1332    Pharmacy to Dose enoxaparin (LOVENOX)   Not Applicable Continuous PRN  "Cornelio Rosenthal MD        [COMPLETED] sodium chloride 0.9 % bolus 1,000 mL  1,000 mL Intravenous Once Jhon Burks MD   Stopped at 01/27/25 1459    [COMPLETED] sodium chloride 0.9 % bolus 1,000 mL  1,000 mL Intravenous Once Cornelio Rosenthal MD 4,000 mL/hr at 01/27/25 1508 1,000 mL at 01/27/25 1508    sodium chloride 0.9 % flush 10 mL  10 mL Intravenous PRN Jhon Burks MD        sodium chloride 0.9 % flush 10 mL  10 mL Intravenous Q12H Cornelio Rosenthal MD        sodium chloride 0.9 % flush 10 mL  10 mL Intravenous PRN Cornelio Rosenthal MD        sodium chloride 0.9 % infusion 40 mL  40 mL Intravenous PRN Cornelio Rosenthal MD        sodium chloride 0.9 % infusion  125 mL/hr Intravenous Continuous Cornelio Rosenthal  mL/hr at 01/27/25 1555 125 mL/hr at 01/27/25 1555     Lab Results (last 24 hours)       Procedure Component Value Units Date/Time    Procalcitonin [[id number]]  (Normal) Collected: 01/27/25 1310    Specimen: Blood from Arm, Right Updated: 01/27/25 1529     Procalcitonin 0.25 ng/mL     Narrative:      As a Marker for Sepsis (Non-Neonates):    1. <0.5 ng/mL represents a low risk of severe sepsis and/or septic shock.  2. >2 ng/mL represents a high risk of severe sepsis and/or septic shock.    As a Marker for Lower Respiratory Tract Infections that require antibiotic therapy:    PCT on Admission    Antibiotic Therapy       6-12 Hrs later    >0.5                Strongly Recommended  >0.25 - <0.5        Recommended  0.1 - 0.25          Discouraged              Remeasure/reassess PCT  <0.1                Strongly Discouraged     Remeasure/reassess PCT    As 28 day mortality risk marker: \"Change in Procalcitonin Result\" (>80% or <=80%) if Day 0 (or Day 1) and Day 4 values are available. Refer to http://[email or web address]-pct-calculator.[email or web address]    Change in PCT <=80%  A decrease of PCT levels below or equal to 80% defines a positive change in PCT test result representing a higher risk for 28-day all-cause mortality of " patients diagnosed with severe sepsis for septic shock.    Change in PCT >80%  A decrease of PCT levels of more than 80% defines a negative change in PCT result representing a lower risk for 28-day all-cause mortality of patients diagnosed with severe sepsis or septic shock.    This test is Prognostic not Diagnostic, if elevated correlate with clinical findings before administering antibiotic treatment.        High Sensitivity Troponin T 1Hr [[id number]]  (Abnormal) Collected: 01/27/25 1310    Specimen: Blood from Arm, Right Updated: 01/27/25 1408     HS Troponin T 27 ng/L      Troponin T Numeric Delta -2 ng/L      Troponin T % Delta -7    Narrative:      High Sensitive Troponin T Reference Range:  <14.0 ng/L- Negative Female for AMI  <22.0 ng/L- Negative Male for AMI  >=14 - Abnormal Female indicating possible myocardial injury.  >=22 - Abnormal Male indicating possible myocardial injury.   Clinicians would have to utilize clinical acumen, EKG, Troponin, and serial changes to determine if it is an Acute Myocardial Infarction or myocardial injury due to an underlying chronic condition.         Comprehensive Metabolic Panel [[id number]]  (Abnormal) Collected: 01/27/25 1138    Specimen: Blood Updated: 01/27/25 1235     Glucose 90 mg/dL      BUN 23 mg/dL      Creatinine 1.72 mg/dL      Sodium 125 mmol/L      Potassium 4.6 mmol/L      Chloride 88 mmol/L      CO2 24.8 mmol/L      Calcium 8.4 mg/dL      Total Protein 6.7 g/dL      Albumin 4.1 g/dL      ALT (SGPT) 5 U/L      AST (SGOT) 26 U/L      Alkaline Phosphatase 53 U/L      Total Bilirubin 1.2 mg/dL      Globulin 2.6 gm/dL      A/G Ratio 1.6 g/dL      BUN/Creatinine Ratio 13.4     Anion Gap 12.2 mmol/L      eGFR 41.5 mL/min/1.73     Narrative:      GFR Categories in Chronic Kidney Disease (CKD)      GFR Category          GFR (mL/min/1.73)    Interpretation  G1                     90 or greater         Normal or high (1)  G2                      60-89                 Mild decrease (1)  G3a                   45-59                Mild to moderate decrease  G3b                   30-44                Moderate to severe decrease  G4                    15-29                Severe decrease  G5                    14 or less           Kidney failure          (1)In the absence of evidence of kidney disease, neither GFR category G1 or G2 fulfill the criteria for CKD.    eGFR calculation 2021 CKD-EPI creatinine equation, which does not include race as a factor    COVID-19, FLU A/B, RSV PCR 1 HR TAT - Swab, Nasopharynx [[id number]]  (Abnormal) Collected: 01/27/25 1139    Specimen: Swab from Nasopharynx Updated: 01/27/25 1233     COVID19 Not Detected     Influenza A PCR Detected     Influenza B PCR Not Detected     RSV, PCR Not Detected    Narrative:      Fact sheet for providers: https://www.fda.gov/media/488072/download    Fact sheet for patients: https://www.fda.gov/media/174798/download    Test performed by PCR.    High Sensitivity Troponin T [[id number]]  (Abnormal) Collected: 01/27/25 1138    Specimen: Blood Updated: 01/27/25 1220     HS Troponin T 29 ng/L     Narrative:      High Sensitive Troponin T Reference Range:  <14.0 ng/L- Negative Female for AMI  <22.0 ng/L- Negative Male for AMI  >=14 - Abnormal Female indicating possible myocardial injury.  >=22 - Abnormal Male indicating possible myocardial injury.   Clinicians would have to utilize clinical acumen, EKG, Troponin, and serial changes to determine if it is an Acute Myocardial Infarction or myocardial injury due to an underlying chronic condition.         CBC & Differential [[id number]]  (Abnormal) Collected: 01/27/25 1138    Specimen: Blood Updated: 01/27/25 1219    Narrative:      The following orders were created for panel order CBC & Differential.  Procedure                               Abnormality         Status                     ---------                               -----------         ------                     CBC  Auto Differential[[id number]]        Abnormal            Final result               Scan Slide[[id number]]                                       Final result                 Please view results for these tests on the individual orders.    CBC Auto Differential [[id number]]  (Abnormal) Collected: 01/27/25 1138    Specimen: Blood Updated: 01/27/25 1219     WBC 3.70 10*3/mm3      RBC 4.68 10*6/mm3      Hemoglobin 14.5 g/dL      Hematocrit 41.8 %      MCV 89.3 fL      MCH 31.0 pg      MCHC 34.7 g/dL      RDW 14.4 %      RDW-SD 46.4 fl      MPV 9.7 fL      Platelets 68 10*3/mm3      Neutrophil % 54.6 %      Lymphocyte % 32.4 %      Monocyte % 11.4 %      Eosinophil % 0.3 %      Basophil % 0.5 %      Immature Grans % 0.8 %      Neutrophils, Absolute 2.02 10*3/mm3      Lymphocytes, Absolute 1.20 10*3/mm3      Monocytes, Absolute 0.42 10*3/mm3      Eosinophils, Absolute 0.01 10*3/mm3      Basophils, Absolute 0.02 10*3/mm3      Immature Grans, Absolute 0.03 10*3/mm3      nRBC 0.0 /100 WBC     Scan Slide [[id number]] Collected: 01/27/25 1138    Specimen: Blood Updated: 01/27/25 1219     Polychromasia Slight/1+     WBC Morphology Normal     Platelet Estimate Decreased    BNP [[id number]]  (Abnormal) Collected: 01/27/25 1138    Specimen: Blood Updated: 01/27/25 1215     proBNP 9,098.0 pg/mL     Narrative:      This assay is used as an aid in the diagnosis of individuals suspected of having heart failure. It can be used as an aid in the diagnosis of acute decompensated heart failure (ADHF) in patients presenting with signs and symptoms of ADHF to the emergency department (ED). In addition, NT-proBNP of <300 pg/mL indicates ADHF is not likely.    Age Range Result Interpretation  NT-proBNP Concentration (pg/mL:      <50             Positive            >450                   Gray                 300-450                    Negative             <300    50-75           Positive            >900                  Gray                300-900                   Negative            <300      >75             Positive            >1800                  Gray                300-1800                  Negative            <300    Lactic Acid, Plasma [[id number]]  (Normal) Collected: 01/27/25 1139    Specimen: Blood Updated: 01/27/25 1210     Lactate 1.3 mmol/L     [place] Draw [[id number]] Collected: 01/27/25 1138    Specimen: Blood Updated: 01/27/25 1200    Narrative:      The following orders were created for panel order [place] Draw.  Procedure                               Abnormality         Status                     ---------                               -----------         ------                     Green Top (Gel)[[id number]]                                  Final result               Lavender Top[[id number]]                                     Final result               Gold Top - SST[[id number]]                                   Final result               Light Blue Top[[id number]]                                   Final result                 Please view results for these tests on the individual orders.    Green Top (Gel) [[id number]] Collected: 01/27/25 1138    Specimen: Blood Updated: 01/27/25 1200     Extra Tube Hold for add-ons.     Comment: Auto resulted.       Lavender Top [[id number]] Collected: 01/27/25 1138    Specimen: Blood Updated: 01/27/25 1200     Extra Tube hold for add-on     Comment: Auto resulted       Gold Top - SST [[id number]] Collected: 01/27/25 1138    Specimen: Blood Updated: 01/27/25 1200     Extra Tube Hold for add-ons.     Comment: Auto resulted.       Light Blue Top [[id number]] Collected: 01/27/25 1138    Specimen: Blood Updated: 01/27/25 1200     Extra Tube Hold for add-ons.     Comment: Auto resulted       Blood Culture - Blood, Arm, Left [[id number]] Collected: 01/27/25 1139    Specimen: Blood from Arm, Left Updated: 01/27/25 1148    Blood Culture - Blood, Arm, Right [[id number]] Collected: 01/27/25 1144    Specimen: Blood from Arm,  Right Updated: 01/27/25 1147          Imaging Results (Last 24 Hours)       Procedure Component Value Units Date/Time    XR Chest 1 View [[id number]] Collected: 01/27/25 1215     Updated: 01/27/25 1218    Narrative:      XR CHEST 1 VW    Date of Exam: 1/27/2025 12:05 PM EST    Indication: SOA Triage Protocol    Comparison: Chest x-ray 6/24/2024    Findings:  The heart is stable in size. Median sternotomy wires are noted. 3 cm left hilar mass appears unchanged. There is left lower lung volume loss with blunting of the costophrenic angle compatible with trace effusion. No evidence for pneumothorax. No focal   consolidation.      Impression:      Impression:  Left lower lung volume loss and trace left pleural effusion.    Stable appearance to a 3 cm left hilar mass.      Electronically Signed: Carlene Arroyo MD    1/27/2025 12:16 PM EST    Workstation ID: [id number]          Orders (last 24 hrs)        Start     Ordered    01/28/25 0600  Basic Metabolic Panel  Morning Draw         01/27/25 1319    01/28/25 0600  CBC Auto Differential  Morning Draw         01/27/25 1319    01/27/25 1800  Oral Care  2 Times Daily       01/27/25 1319    01/27/25 1700  Enoxaparin Sodium (LOVENOX) syringe 40 mg  Every 24 Hours         01/27/25 1321    01/27/25 1630  ipratropium-albuterol (DUO-NEB) nebulizer solution 3 mL  4 Times Daily - RT         01/27/25 1319    01/27/25 1600  Vital Signs  Every 4 Hours       01/27/25 1319    01/27/25 1530  sodium chloride 0.9 % bolus 1,000 mL  Once         01/27/25 1502    01/27/25 1515  sodium chloride 0.9 % infusion  Continuous         01/27/25 1446    01/27/25 1500  cefTRIAXone (ROCEPHIN) in NS 1 gram/10ml IV PUSH syringe  Every 24 Hours         01/27/25 1442    01/27/25 1500  doxycycline (VIBRAMYCIN) 100 mg in sodium chloride 0.9 % 100 mL IVPB-VTB  Every 12 Hours         01/27/25 1442    01/27/25 1445  ibuprofen (ADVIL,MOTRIN) tablet 600 mg  Once         01/27/25 1420    01/27/25 1441  Procalcitonin   "STAT         01/27/25 1441    01/27/25 1345  sodium chloride 0.9 % flush 10 mL  Every 12 Hours Scheduled         01/27/25 1319    01/27/25 1345  doxycycline (MONODOX) capsule 100 mg  Every 12 Hours Scheduled,   Status:  Discontinued         01/27/25 1319    01/27/25 1317  ondansetron (ZOFRAN) injection 4 mg  Every 6 Hours PRN         01/27/25 1319    01/27/25 1317  acetaminophen (TYLENOL) tablet 650 mg  Every 4 Hours PRN         01/27/25 1319    01/27/25 1317  Diet: Regular/House; Fluid Consistency: Thin (IDDSI 0)  Diet Effective Now         01/27/25 1319    01/27/25 1316  sennosides-docusate (PERICOLACE) 8.6-50 MG per tablet 2 tablet  2 Times Daily PRN        Placed in \"And\" Linked Group    01/27/25 1319    01/27/25 1316  polyethylene glycol (MIRALAX) packet 17 g  Daily PRN        Placed in \"And\" Linked Group    01/27/25 1319    01/27/25 1316  bisacodyl (DULCOLAX) EC tablet 5 mg  Daily PRN        Placed in \"And\" Linked Group    01/27/25 1319    01/27/25 1316  bisacodyl (DULCOLAX) suppository 10 mg  Daily PRN        Placed in \"And\" Linked Group    01/27/25 1319    01/27/25 1315  oseltamivir (TAMIFLU) capsule 75 mg  Once         01/27/25 1248    01/27/25 1315  sodium chloride 0.9 % bolus 1,000 mL  Once         01/27/25 1248    01/27/25 1315  Continuous Cardiac Monitoring  Continuous        Comments: Follow Standing Orders As Outlined in Process Instructions (Open Order Report to View Full Instructions)    01/27/25 1319    01/27/25 1315  Maintain IV Access  Continuous         01/27/25 1319    01/27/25 1315  Telemetry - Place Orders & Notify Provider of Results When Patient Experiences Acute Chest Pain, Dysrhythmia or Respiratory Distress  Continuous        Comments: Open Order Report to View Parameters Requiring Provider Notification    01/27/25 1319    01/27/25 1315  Continuous Pulse Oximetry  Continuous         01/27/25 1319    01/27/25 1314  nitroglycerin (NITROSTAT) SL tablet 0.4 mg  Every 5 Minutes PRN         " 01/27/25 1319    01/27/25 1314  Pharmacy to Dose enoxaparin (LOVENOX)  Continuous PRN         01/27/25 1319    01/27/25 1314  Intake & Output  Every Shift       01/27/25 1319    01/27/25 1314  Weigh Patient  Once         01/27/25 1319    01/27/25 1314  Insert Peripheral IV  Once         01/27/25 1319    01/27/25 1314  Saline Lock & Maintain IV Access  Continuous,   Status:  Canceled         01/27/25 1319    01/27/25 1314  Inpatient Admission  Once         01/27/25 1319    01/27/25 1314  Code Status and Medical Interventions: CPR (Attempt to Resuscitate); Full Support  Continuous         01/27/25 1319    01/27/25 1313  sodium chloride 0.9 % flush 10 mL  As Needed         01/27/25 1319    01/27/25 1313  sodium chloride 0.9 % infusion 40 mL  As Needed         01/27/25 1319    01/27/25 1244  Hospitalist (on-call MD unless specified)  Once        Specialty:  Hospitalist  Provider:  Cornelio Rosenthal MD    01/27/25 1243    01/27/25 1238  High Sensitivity Troponin T 1Hr  PROCEDURE ONCE         01/27/25 1220    01/27/25 1200  acetaminophen (TYLENOL) tablet 975 mg  Once         01/27/25 1138    01/27/25 1154  Scan Slide  Once         01/27/25 1153    01/27/25 1140  Blood Culture - Blood, Arm, Right  Once         01/27/25 1139    01/27/25 1140  Blood Culture - Blood, Arm, Left  Once         01/27/25 1139    01/27/25 1140  Lactic Acid, Plasma  Once         01/27/25 1139    01/27/25 1139  COVID-19, FLU A/B, RSV PCR 1 HR TAT - Swab, Nasopharynx  Once         01/27/25 1138    01/27/25 1115  NPO Diet NPO Type: Strict NPO  Diet Effective Now,   Status:  Canceled         01/27/25 1114    01/27/25 1115  Undress & Gown  Once         01/27/25 1114    01/27/25 1115  Cardiac Monitoring  Continuous,   Status:  Canceled        Comments: Follow Standing Orders As Outlined in Process Instructions (Open Order Report to View Full Instructions)    01/27/25 1114    01/27/25 1115  Continuous Pulse Oximetry  Continuous,   Status:  Canceled          01/27/25 1114    01/27/25 1115  Vital Signs  Per Hospital Policy         01/27/25 1114    01/27/25 1115  ECG 12 Lead ED Triage Standing Order; SOA  Once         01/27/25 1114    01/27/25 1115  XR Chest 1 View  1 Time Imaging         01/27/25 1114    01/27/25 1115  Insert Peripheral IV  Once         01/27/25 1114    01/27/25 1115  [place] Draw  Once         01/27/25 1114    01/27/25 1115  CBC & Differential  Once         01/27/25 1114    01/27/25 1115  Comprehensive Metabolic Panel  Once         01/27/25 1114    01/27/25 1115  BNP  Once         01/27/25 1114    01/27/25 1115  High Sensitivity Troponin T  Once         01/27/25 1114    01/27/25 1115  Green Top (Gel)  PROCEDURE ONCE         01/27/25 1114    01/27/25 1115  Lavender Top  PROCEDURE ONCE         01/27/25 1114    01/27/25 1115  Gold Top - SST  PROCEDURE ONCE         01/27/25 1114    01/27/25 1115  Light Blue Top  PROCEDURE ONCE         01/27/25 1114    01/27/25 1115  CBC Auto Differential  PROCEDURE ONCE         01/27/25 1114    01/27/25 1114  sodium chloride 0.9 % flush 10 mL  As Needed         01/27/25 1114    Unscheduled  Oxygen Therapy- Nasal Cannula; Titrate 1-6 LPM Per SpO2; 90 - 95%  Continuous PRN       01/27/25 1114    Unscheduled  Up in Chair  As Needed       01/27/25 1319    Unscheduled  Oxygen Therapy- Nasal Cannula; Titrate 1-6 LPM Per SpO2; 90 - 95%  Continuous PRN       01/27/25 1319

## 2025-01-28 LAB
ANION GAP SERPL CALCULATED.3IONS-SCNC: 10.6 MMOL/L (ref 5–15)
BASOPHILS # BLD AUTO: 0.01 10*3/MM3 (ref 0–0.2)
BASOPHILS NFR BLD AUTO: 0.2 % (ref 0–1.5)
BUN SERPL-MCNC: 23 MG/DL (ref 8–23)
BUN/CREAT SERPL: 15.3 (ref 7–25)
CALCIUM SPEC-SCNC: 7.5 MG/DL (ref 8.6–10.5)
CHLORIDE SERPL-SCNC: 98 MMOL/L (ref 98–107)
CO2 SERPL-SCNC: 22.4 MMOL/L (ref 22–29)
CREAT SERPL-MCNC: 1.5 MG/DL (ref 0.76–1.27)
DEPRECATED RDW RBC AUTO: 50.7 FL (ref 37–54)
EGFRCR SERPLBLD CKD-EPI 2021: 48.9 ML/MIN/1.73
EOSINOPHIL # BLD AUTO: 0 10*3/MM3 (ref 0–0.4)
EOSINOPHIL NFR BLD AUTO: 0 % (ref 0.3–6.2)
ERYTHROCYTE [DISTWIDTH] IN BLOOD BY AUTOMATED COUNT: 14.7 % (ref 12.3–15.4)
GLUCOSE SERPL-MCNC: 80 MG/DL (ref 65–99)
HCT VFR BLD AUTO: 38.7 % (ref 37.5–51)
HGB BLD-MCNC: 12.6 G/DL (ref 13–17.7)
IMM GRANULOCYTES # BLD AUTO: 0.03 10*3/MM3 (ref 0–0.05)
IMM GRANULOCYTES NFR BLD AUTO: 0.7 % (ref 0–0.5)
LYMPHOCYTES # BLD AUTO: 0.71 10*3/MM3 (ref 0.7–3.1)
LYMPHOCYTES NFR BLD AUTO: 15.8 % (ref 19.6–45.3)
MCH RBC QN AUTO: 30.1 PG (ref 26.6–33)
MCHC RBC AUTO-ENTMCNC: 32.6 G/DL (ref 31.5–35.7)
MCV RBC AUTO: 92.6 FL (ref 79–97)
MONOCYTES # BLD AUTO: 0.32 10*3/MM3 (ref 0.1–0.9)
MONOCYTES NFR BLD AUTO: 7.1 % (ref 5–12)
NEUTROPHILS NFR BLD AUTO: 3.41 10*3/MM3 (ref 1.7–7)
NEUTROPHILS NFR BLD AUTO: 76.2 % (ref 42.7–76)
NRBC BLD AUTO-RTO: 0 /100 WBC (ref 0–0.2)
PLATELET # BLD AUTO: 52 10*3/MM3 (ref 140–450)
PMV BLD AUTO: 10.1 FL (ref 6–12)
POTASSIUM SERPL-SCNC: 4.3 MMOL/L (ref 3.5–5.2)
RBC # BLD AUTO: 4.18 10*6/MM3 (ref 4.14–5.8)
SODIUM SERPL-SCNC: 131 MMOL/L (ref 136–145)
WBC NRBC COR # BLD AUTO: 4.48 10*3/MM3 (ref 3.4–10.8)

## 2025-01-28 PROCEDURE — 94664 DEMO&/EVAL PT USE INHALER: CPT

## 2025-01-28 PROCEDURE — 25010000002 CEFTRIAXONE PER 250 MG: Performed by: STUDENT IN AN ORGANIZED HEALTH CARE EDUCATION/TRAINING PROGRAM

## 2025-01-28 PROCEDURE — 94799 UNLISTED PULMONARY SVC/PX: CPT

## 2025-01-28 PROCEDURE — 25010000002 ENOXAPARIN PER 10 MG: Performed by: STUDENT IN AN ORGANIZED HEALTH CARE EDUCATION/TRAINING PROGRAM

## 2025-01-28 PROCEDURE — 85025 COMPLETE CBC W/AUTO DIFF WBC: CPT | Performed by: STUDENT IN AN ORGANIZED HEALTH CARE EDUCATION/TRAINING PROGRAM

## 2025-01-28 PROCEDURE — 99232 SBSQ HOSP IP/OBS MODERATE 35: CPT | Performed by: STUDENT IN AN ORGANIZED HEALTH CARE EDUCATION/TRAINING PROGRAM

## 2025-01-28 PROCEDURE — 80048 BASIC METABOLIC PNL TOTAL CA: CPT | Performed by: STUDENT IN AN ORGANIZED HEALTH CARE EDUCATION/TRAINING PROGRAM

## 2025-01-28 RX ORDER — OSELTAMIVIR PHOSPHATE 75 MG/1
75 CAPSULE ORAL EVERY 12 HOURS SCHEDULED
Status: DISCONTINUED | OUTPATIENT
Start: 2025-01-28 | End: 2025-01-29

## 2025-01-28 RX ORDER — ALPRAZOLAM 0.25 MG/1
0.25 TABLET ORAL NIGHTLY PRN
Status: DISCONTINUED | OUTPATIENT
Start: 2025-01-28 | End: 2025-01-29 | Stop reason: HOSPADM

## 2025-01-28 RX ADMIN — ENOXAPARIN SODIUM 40 MG: 100 INJECTION SUBCUTANEOUS at 17:43

## 2025-01-28 RX ADMIN — OSELTAMIVIR PHOSPHATE 75 MG: 75 CAPSULE ORAL at 20:53

## 2025-01-28 RX ADMIN — IPRATROPIUM BROMIDE AND ALBUTEROL SULFATE 3 ML: .5; 3 SOLUTION RESPIRATORY (INHALATION) at 01:10

## 2025-01-28 RX ADMIN — SODIUM CHLORIDE 1000 MG: 9 INJECTION INTRAMUSCULAR; INTRAVENOUS; SUBCUTANEOUS at 14:41

## 2025-01-28 RX ADMIN — DOXYCYCLINE 100 MG: 100 INJECTION, POWDER, LYOPHILIZED, FOR SOLUTION INTRAVENOUS at 04:34

## 2025-01-28 RX ADMIN — IPRATROPIUM BROMIDE AND ALBUTEROL SULFATE 3 ML: .5; 3 SOLUTION RESPIRATORY (INHALATION) at 12:05

## 2025-01-28 RX ADMIN — IPRATROPIUM BROMIDE AND ALBUTEROL SULFATE 3 ML: .5; 3 SOLUTION RESPIRATORY (INHALATION) at 07:43

## 2025-01-28 RX ADMIN — Medication 10 ML: at 09:29

## 2025-01-28 RX ADMIN — ALPRAZOLAM 0.25 MG: 0.25 TABLET ORAL at 20:53

## 2025-01-28 RX ADMIN — Medication 10 ML: at 20:53

## 2025-01-28 RX ADMIN — DOXYCYCLINE 100 MG: 100 INJECTION, POWDER, LYOPHILIZED, FOR SOLUTION INTRAVENOUS at 14:41

## 2025-01-28 NOTE — PLAN OF CARE
Goal Outcome Evaluation:           Progress: improving  Outcome Evaluation: Patient A&Ox3, able to make needs known. Patient denies c/o pain throughout shift. Patient has rested quietly for most of shift. Patient recieving iv antibiotics, tolerating well.

## 2025-01-28 NOTE — CONSULTS
"Nutrition Services    Patient Name: Cm Flores  YOB: 1950  MRN: [id number]  Admission date: 1/27/2025      CLINICAL NUTRITION ASSESSMENT      Reason for Assessment  BMI     H&P:  Past Medical History:   Diagnosis Date    Anxiety     Arthritis     Bladder cancer     Bladder cancer 2010    lining of bladder     Broken bones      as a child     CAD (coronary artery disease) 02/2014    Calculus of kidney     Cataracts, both eyes     CHF (congestive heart failure)     Constipation     Deafness     Depression     Essential hypertension 6/24/2021    Forgetfulness     Gallstones     Head injury     Heart disease     Hemorrhoids     Hernia of pelvic floor     High cholesterol     High cholesterol     History of bladder cancer 6/24/2021    History of heart attack     Hypertension     Kidney stones     Mixed hyperlipidemia 6/24/2021    Primary insomnia 6/24/2021    Shortness of breath     Sinus trouble     Skin disease     UNSURE WHAT KIND/ARMS    Tobacco abuse 6/24/2021        Current Problems:   Active Hospital Problems    Diagnosis     **Hypoxic respiratory failure         Nutrition/Diet History         Narrative   Stated he has always been thin. No recent wt changes. Per recall, intake < 75% of estimated needs on a daily basis; intake unchanged x several years. Agreeable to chocolate ONS. Edentulous, family is bringing dentures; no texture change desired.  RD to follow per protocol. STC diet with chopped meats and ONS BID.     Anthropometrics        Current Height, Weight Height: 182.9 cm (72\")  Weight: 59.8 kg (131 lb 13.4 oz)   Current BMI Body mass index is 17.88 kg/m².   BMI Classification Underweight   % IBW 74%, IBW 81 kg   Adjusted Body Weight (ABW)    Weight Hx  Wt Readings from Last 15 Encounters:   01/27/25 1117 59.8 kg (131 lb 13.4 oz)   01/23/25 1248 59.1 kg (130 lb 6.4 oz)   07/23/24 0840 57.2 kg (126 lb)   06/25/24 0942 59.9 kg (132 lb)   06/24/24 1601 60.1 kg (132 lb 6.4 oz)   01/23/24 " "1440 58.8 kg (129 lb 9.6 oz)   02/07/23 1137 67.9 kg (149 lb 9.6 oz)   11/07/22 1101 63.1 kg (139 lb 3.2 oz)   11/03/22 0924 67.6 kg (149 lb)   10/26/22 0047 68 kg (149 lb 14.6 oz)   10/25/22 1718 67.3 kg (148 lb 5.9 oz)   10/25/22 1345 68 kg (149 lb 14.4 oz)   09/07/22 1005 66.7 kg (147 lb)   07/26/22 0959 66.9 kg (147 lb 8 oz)   06/08/22 1050 63.5 kg (140 lb)   04/29/22 1134 62.5 kg (137 lb 12.8 oz)          Wt Change Observation Per EMR, wt stable over last year     Estimated/Assessed Needs  Estimated Needs based on: Current Body Weight 60 kg       Energy Requirements 35-40 kcal/kg   EST Needs (kcal/day) 6562-3629 kcal       Protein Requirements 1.5 g/kg   EST Daily Needs (g/day) 90 g       Fluid Requirements 25-30 ml/kg or per provider, hyponatremic/CHF    Estimated Needs (mL/day) 1610-9788 mL     Labs/Medications         Pertinent Labs Reviewed.   Results from last 7 days   Lab Units 01/28/25  0515 01/27/25  1138   SODIUM mmol/L 131* 125*   POTASSIUM mmol/L 4.3 4.6   CHLORIDE mmol/L 98 88*   CO2 mmol/L 22.4 24.8   BUN mg/dL 23 23   CREATININE mg/dL 1.50* 1.72*   CALCIUM mg/dL 7.5* 8.4*   BILIRUBIN mg/dL  --  1.2   ALK PHOS U/L  --  53   ALT (SGPT) U/L  --  5   AST (SGOT) U/L  --  26   GLUCOSE mg/dL 80 90     Results from last 7 days   Lab Units 01/28/25  0515   HEMOGLOBIN g/dL 12.6*   HEMATOCRIT % 38.7     COVID19   Date Value Ref Range Status   01/27/2025 Not Detected Not Detected - Ref. Range Final     No results found for: \"HGBA1C\"      Pertinent Medications Reviewed.     Malnutrition Severity Assessment      Patient meets criteria for : Moderate (non-severe) Malnutrition  Malnutrition Type (Last 8 Hours)       Malnutrition Severity Assessment       Row Name 01/28/25 1251       Malnutrition Severity Assessment    Malnutrition Type Chronic Disease - Related Malnutrition      Row Name 01/28/25 1251       Insufficient Energy Intake     Insufficient Energy Intake Findings Moderate    Insufficient Energy Intake  " <75% of est. energy requirement for > or equal to 3 months      Row Name 01/28/25 1251       Muscle Loss    Loss of Muscle Mass Findings Mild    Latter-day Region Moderate - slight depression    Anterior Thigh Region Moderate - mild depression on inner thigh    Posterior Calf Region Moderate - some roundness, slight firmness      Row Name 01/28/25 1251       Fat Loss    Subcutaneous Fat Loss Findings Mild    Orbital Region  Moderate -  somewhat hollowness, slightly dark circles      Row Name 01/28/25 1251       Criteria Met (Must meet criteria for severity in at least 2 of these categories: M Wasting, Fat Loss, Fluid, Secondary Signs, Wt. Status, Intake)    Patient meets criteria for  Moderate (non-severe) Malnutrition                     Nutrition Diagnosis         Nutrition Dx Problem 1 Moderate malnutrition related to Inability to consume sufficient energy as evidenced by inadequate energy intake., decreased appetite., body composition changes., and patient report.     Nutrition Intervention           Current Nutrition Orders & Evaluation of Intake       Current PO Diet Diet: Regular/House; Texture: Soft to Chew (NDD 3); Soft to Chew: Chopped Meat; Fluid Consistency: Thin (IDDSI 0)   Supplement Orders Placed This Encounter      Dietary Nutrition Supplements Boost Plus (Ensure Plus); chocolate           Nutrition Intervention/Prescription        Boost plus BID        Medical Nutrition Therapy/Nutrition Education          Learner     Readiness Patient  Acceptance     Method     Response Explanation  Verbalizes understanding     Monitor/Evaluation        Monitor Per protocol, PO intake, Supplement intake, Pertinent labs     Nutrition Discharge Plan         Recommend to continue oral nutrition supplements on discharge.      Electronically signed by:  Flory Jimenes RD  01/28/25 12:56 EST

## 2025-01-28 NOTE — PLAN OF CARE
Goal Outcome Evaluation:   VSS. Normal sinus rhythm. Lungs with expiratory wheezes, 02 at 2 LPM. No complaints of pain. Voids per urinal. Tolerating IV antibiotics. Remains in isolations for Influenza A. Continue with current plan of care, possible discharge home tomorrow.

## 2025-01-28 NOTE — PROGRESS NOTES
[facility]   Hospitalist Progress Note  Date: 2025  Patient Name: Cm Flores  : 1950  MRN: [id number]  Date of admission: 2025  Room/Bed: [facility]      Subjective   Subjective     Chief Complaint: Shortness of breath    Summary:Cm Flores is a 74 y.o. male with COPD, hypertension who presents to the emergency room with shortness of breath, generalized weakness poor appetite, and muscle aches.  Patient found to have influenza A.  Patient is improving from a respiratory standpoint.    Interval Followup:   Tmax of 38.5 yesterday afternoon. Patient says that he is feeling better since coming in.     All systems reviewed and negative except for what is outlined above.      Objective   Objective     Vitals:   Temp:  [97.3 °F (36.3 °C)-101.8 °F (38.8 °C)] 98.8 °F (37.1 °C)  Heart Rate:  [62-93] 78  Resp:  [18-58] 20  BP: ()/(48-89) 126/58  Flow (L/min) (Oxygen Therapy):  [2] 2    Physical Exam   General: No acute distress  Cardiovascular: RRR, no murmurs   Pulmonary: End expiratory wheezing    Result Review    Result Review:  I have personally reviewed these results:  [x]  Laboratory      Lab 25  0515 25  1310 25  1139 25  1138   WBC 4.48  --   --  3.70   HEMOGLOBIN 12.6*  --   --  14.5   HEMATOCRIT 38.7  --   --  41.8   PLATELETS 52*  --   --  68*   NEUTROS ABS 3.41  --   --  2.02   IMMATURE GRANS (ABS) 0.03  --   --  0.03   LYMPHS ABS 0.71  --   --  1.20   MONOS ABS 0.32  --   --  0.42   EOS ABS 0.00  --   --  0.01   MCV 92.6  --   --  89.3   PROCALCITONIN  --  0.25  --   --    LACTATE  --   --  1.3  --          Lab 25  0515 25  1138   SODIUM 131* 125*   POTASSIUM 4.3 4.6   CHLORIDE 98 88*   CO2 22.4 24.8   ANION GAP 10.6 12.2   BUN 23 23   CREATININE 1.50* 1.72*   EGFR 48.9* 41.5*   GLUCOSE 80 90   CALCIUM 7.5* 8.4*         Lab 25  1138   TOTAL PROTEIN 6.7   ALBUMIN 4.1   GLOBULIN 2.6   ALT (SGPT) 5   AST (SGOT) 26   BILIRUBIN 1.2   ALK PHOS 53          Lab 01/27/25  1310 01/27/25  1138   PROBNP  --  9,098.0*   HSTROP T 27* 29*                 Brief Urine Lab Results       None          [x]  Microbiology   Microbiology Results (last 10 days)       Procedure Component Value - Date/Time    COVID-19, FLU A/B, RSV PCR 1 HR TAT - Swab, Nasopharynx [[id number]]  (Abnormal) Collected: 01/27/25 1139    Lab Status: Final result Specimen: Swab from Nasopharynx Updated: 01/27/25 1233     COVID19 Not Detected     Influenza A PCR Detected     Influenza B PCR Not Detected     RSV, PCR Not Detected    Narrative:      Fact sheet for providers: https://www.fda.gov/media/393145/download    Fact sheet for patients: https://www.fda.gov/media/391024/download    Test performed by PCR.          [x]  Radiology  XR Chest 1 View    Result Date: 1/27/2025  Impression: Left lower lung volume loss and trace left pleural effusion. Stable appearance to a 3 cm left hilar mass. Electronically Signed: Carlene Arroyo MD  1/27/2025 12:16 PM EST  Workstation ID: [id number]   []  EKG/Telemetry   []  Cardiology/Vascular   []  Pathology  []  Old records  []  Other:    Assessment & Plan        Assessment and Plan:    #Influenza A  Tamiflu    #Concern for superimposed pneumonia  Doxycycline and ceftriaxone    #COPD  DuoNebs    #Anxiety  As needed Xanax    Discussed with RN.    VTE Prophylaxis:  Pharmacologic VTE prophylaxis orders are present.        CODE STATUS:   Code Status (Patient has no pulse and is not breathing): CPR (Attempt to Resuscitate)  Medical Interventions (Patient has pulse or is breathing): Full Support      Electronically signed by Westley Ramon MD, 1/28/2025, 08:11 EST.

## 2025-01-29 ENCOUNTER — READMISSION MANAGEMENT (OUTPATIENT)
Dept: CALL CENTER | Facility: HOSPITAL | Age: 75
End: 2025-01-29
Payer: MEDICARE

## 2025-01-29 VITALS
OXYGEN SATURATION: 91 % | HEART RATE: 94 BPM | SYSTOLIC BLOOD PRESSURE: 120 MMHG | WEIGHT: 131.84 LBS | HEIGHT: 72 IN | BODY MASS INDEX: 17.86 KG/M2 | RESPIRATION RATE: 18 BRPM | DIASTOLIC BLOOD PRESSURE: 60 MMHG | TEMPERATURE: 98.8 F

## 2025-01-29 PROBLEM — J96.91 HYPOXIC RESPIRATORY FAILURE: Status: RESOLVED | Noted: 2025-01-27 | Resolved: 2025-01-29

## 2025-01-29 LAB
ANION GAP SERPL CALCULATED.3IONS-SCNC: 11.1 MMOL/L (ref 5–15)
BUN SERPL-MCNC: 19 MG/DL (ref 8–23)
BUN/CREAT SERPL: 14 (ref 7–25)
CALCIUM SPEC-SCNC: 7.8 MG/DL (ref 8.6–10.5)
CHLORIDE SERPL-SCNC: 97 MMOL/L (ref 98–107)
CO2 SERPL-SCNC: 21.9 MMOL/L (ref 22–29)
CREAT SERPL-MCNC: 1.36 MG/DL (ref 0.76–1.27)
DEPRECATED RDW RBC AUTO: 49.8 FL (ref 37–54)
EGFRCR SERPLBLD CKD-EPI 2021: 54.9 ML/MIN/1.73
ERYTHROCYTE [DISTWIDTH] IN BLOOD BY AUTOMATED COUNT: 14.3 % (ref 12.3–15.4)
GLUCOSE SERPL-MCNC: 87 MG/DL (ref 65–99)
HCT VFR BLD AUTO: 37.1 % (ref 37.5–51)
HGB BLD-MCNC: 12.1 G/DL (ref 13–17.7)
MCH RBC QN AUTO: 30.5 PG (ref 26.6–33)
MCHC RBC AUTO-ENTMCNC: 32.6 G/DL (ref 31.5–35.7)
MCV RBC AUTO: 93.5 FL (ref 79–97)
PLATELET # BLD AUTO: 52 10*3/MM3 (ref 140–450)
PMV BLD AUTO: 10.5 FL (ref 6–12)
POTASSIUM SERPL-SCNC: 3.9 MMOL/L (ref 3.5–5.2)
RBC # BLD AUTO: 3.97 10*6/MM3 (ref 4.14–5.8)
SODIUM SERPL-SCNC: 130 MMOL/L (ref 136–145)
WBC NRBC COR # BLD AUTO: 3.06 10*3/MM3 (ref 3.4–10.8)

## 2025-01-29 PROCEDURE — 80048 BASIC METABOLIC PNL TOTAL CA: CPT | Performed by: STUDENT IN AN ORGANIZED HEALTH CARE EDUCATION/TRAINING PROGRAM

## 2025-01-29 PROCEDURE — 97161 PT EVAL LOW COMPLEX 20 MIN: CPT

## 2025-01-29 PROCEDURE — 99239 HOSP IP/OBS DSCHRG MGMT >30: CPT | Performed by: STUDENT IN AN ORGANIZED HEALTH CARE EDUCATION/TRAINING PROGRAM

## 2025-01-29 PROCEDURE — 94664 DEMO&/EVAL PT USE INHALER: CPT

## 2025-01-29 PROCEDURE — 85027 COMPLETE CBC AUTOMATED: CPT | Performed by: STUDENT IN AN ORGANIZED HEALTH CARE EDUCATION/TRAINING PROGRAM

## 2025-01-29 PROCEDURE — 94760 N-INVAS EAR/PLS OXIMETRY 1: CPT

## 2025-01-29 PROCEDURE — 94799 UNLISTED PULMONARY SVC/PX: CPT

## 2025-01-29 RX ORDER — CEFDINIR 300 MG/1
300 CAPSULE ORAL 2 TIMES DAILY
Qty: 6 CAPSULE | Refills: 0 | Status: SHIPPED | OUTPATIENT
Start: 2025-01-29 | End: 2025-02-01

## 2025-01-29 RX ORDER — DOXYCYCLINE 100 MG/1
100 CAPSULE ORAL EVERY 12 HOURS SCHEDULED
Qty: 6 CAPSULE | Refills: 0 | Status: SHIPPED | OUTPATIENT
Start: 2025-01-29 | End: 2025-02-01

## 2025-01-29 RX ORDER — OSELTAMIVIR PHOSPHATE 30 MG/1
30 CAPSULE ORAL EVERY 12 HOURS SCHEDULED
Status: DISCONTINUED | OUTPATIENT
Start: 2025-01-29 | End: 2025-01-29 | Stop reason: HOSPADM

## 2025-01-29 RX ORDER — OSELTAMIVIR PHOSPHATE 30 MG/1
30 CAPSULE ORAL EVERY 12 HOURS SCHEDULED
Qty: 8 CAPSULE | Refills: 0 | Status: SHIPPED | OUTPATIENT
Start: 2025-01-29 | End: 2025-02-02

## 2025-01-29 RX ORDER — DOXYCYCLINE 100 MG/1
100 CAPSULE ORAL EVERY 12 HOURS SCHEDULED
Status: DISCONTINUED | OUTPATIENT
Start: 2025-01-29 | End: 2025-01-29 | Stop reason: HOSPADM

## 2025-01-29 RX ADMIN — IPRATROPIUM BROMIDE AND ALBUTEROL SULFATE 3 ML: .5; 3 SOLUTION RESPIRATORY (INHALATION) at 07:30

## 2025-01-29 RX ADMIN — Medication 10 ML: at 09:02

## 2025-01-29 RX ADMIN — OSELTAMIVIR PHOSPHATE 30 MG: 30 CAPSULE ORAL at 09:01

## 2025-01-29 RX ADMIN — DOXYCYCLINE 100 MG: 100 INJECTION, POWDER, LYOPHILIZED, FOR SOLUTION INTRAVENOUS at 03:12

## 2025-01-29 NOTE — PROGRESS NOTES
"Enter Query Response Below      Query Response: unable to determine              If applicable, please update the problem list.     Patient: Cm Flores        : 1950  Account: [id number]           Admit Date:         How to Respond to this query:       a. Click New Note     b. Answer query within the yellow box.                c. Update the Problem List, if applicable.      If you have any questions about this query contact me at:  [email or web address]     Dr. Ramon,     The Registered Dietician evaluated the patient 25 due to BMI of 17.88 noting moderate (non-severe) malnutrition evidenced by:     -Insufficient energy intake of <75% of established energy requirement for > or equal to 3 month.   -Moderate loss of muscle mass findings to the temple, anterior thigh, and posterior calf regions.   -Moderate subcutaneous fat loss findings to the orbital region.    -Supplement:  \"Dietary Nutrition Supplements Boost Plus (Ensure Plus); chocolate.\"  -Monitor:  \"Per protocol, PO intake, Supplement intake, Pertinent labs.\"    Please clarify diagnosis treated/monitored:    Non-Severe (Moderate) chronic illness related malnutrition  Other- specify __________    By submitting this query, we are merely seeking further clarification of documentation to accurately reflect all conditions that you are monitoring, evaluating, treating or that extend the hospitalization or utilize additional resources of care. Please utilize your independent clinical judgment when addressing the question(s) above.     This query and your response, once completed, will be entered into the legal medical record.    Sincerely,  Krystal CURTIS, RN, CCDS  Clinical Documentation Improvement Program  [email or web address]  "

## 2025-01-29 NOTE — PROGRESS NOTES
"Enter Query Response Below      Query Response: unable to determine           If applicable, please update the problem list.   Patient: Cm Flores        : 1950  Account: [id number]           Admit Date:         How to Respond to this query:       a. Click New Note     b. Answer query within the yellow box.                c. Update the Problem List, if applicable.      If you have any questions about this query contact me at:  [email or web address]     Dr. Ramon,    The 25 Discharge Summary indicated \"Hypoxic respiratory failure.\"  The  and  Discharge Needs Assessment indicate the patient had home oxygen prior to admission.  No ABG is noted.   @ 1117 Initial SpO2 91% on room air then 88-97% on room air, @ 1333 placed on 2 Liters of oxygen with SpO2 of 95%, then % on 2 Liters until Discharge.  Related treatment includes Xanax, Rocephin, Duo-Neb, Tamiflu, and Doxycycline.    After study, was hypoxic respiratory failure clinically supported during this admission?  Please clarify acuity of respiratory failure.     Acute hypoxic respiratory failure was supported with additional clinical indicators:____________  Acute hypoxic respiratory failure was not supported, chronic hypoxic respiratory failure  Other- specify_____________  Unable to determine      By submitting this query, we are merely seeking further clarification of documentation to accurately reflect all conditions that you are monitoring, evaluating, treating or that extend the hospitalization or utilize additional resources of care. Please utilize your independent clinical judgment when addressing the question(s) above.     This query and your response, once completed, will be entered into the legal medical record.    Sincerely,  Krystal CURTIS, RN, CCDS  Clinical Documentation Improvement Program  [email or web address]  "

## 2025-01-29 NOTE — PROGRESS NOTES
"Enter Query Response Below      Query Response: concern for superimposed pneumonia in setting of flu              If applicable, please update the problem list.   Patient: Cm Flores        : 1950  Account: [id number]           Admit Date:         How to Respond to this query:       a. Click New Note     b. Answer query within the yellow box.                c. Update the Problem List, if applicable.      If you have any questions about this query contact me at:  [email or web address]       Dr. Ramon,     The 25 Discharge Summary noted Influenza A and \"Treated for concern for superimposed PNA with doxycycline and ceftriaxone.\"  Treatment included Tamiflu -, IV Doxycycline -, IV Rocephin -, and discharging on Tamiflu, Cefdinir, and Doxycycline.     25 WBC 3.70; Procalcitonin 0.25; Influenza A PCR Detected     Please clarify the type of pneumonia treated/monitored:    Bacterial pneumonia  Other- specify_____    By submitting this query, we are merely seeking further clarification of documentation to accurately reflect all conditions that you are monitoring, evaluating, treating or that extend the hospitalization or utilize additional resources of care. Please utilize your independent clinical judgment when addressing the question(s) above.     This query and your response, once completed, will be entered into the legal medical record.    Sincerely,  Krystal CURTIS, RN, CCDS  Clinical Documentation Improvement Program  [email or web address]  "

## 2025-01-29 NOTE — OUTREACH NOTE
Prep Survey      Flowsheet Row Responses   [facility] patient discharged from? [facility]   Is LACE score < 7 ? No   Eligibility [facility]   Date of Admission 01/27/25   Date of Discharge 01/29/25   Discharge Disposition Home or Self Care   Discharge diagnosis Hypoxic respiratory failure, Influenza A   Does the patient have one of the following disease processes/diagnoses(primary or secondary)? Other   Does the patient have Home health ordered? No   Is there a DME ordered? No   Prep survey completed? Yes            Edith GARCIA - Registered Nurse           [facility]   Cardiology  Note   Dr Bhupinder Hurd MD, Milton Rader RN, FNP APRN CVNP  Date: 11/11/2022  Admit Date: 11/4/2022       CC/cardiology consult: Dizziness, MS changes  PMH: CAD/CABG /  2015 neg stress test / HTN balance disorder / dementia     Interval Hx /  Subjective: Today, he is resting in bed, VSS / in NSR   eating breakfast, alert pleasant   On 11/8/222 had NSVT  / Increased Toprol 25mg daily to BID   No new complaints today. No major events overnight. In Covid isolation   Hx CABG years ago with new noted EF15-20% in setting of Covid positive, no cp or SOB noted / near compensated / IV to oral lasix   Started HF meds & Zoll vest to be placed today  May be discharged & fu in 1-2 weeks and will discuss ischemic work up type of work up in setting of no cp or SOB low EF hx CAD & moderate  AR/AS      Patient seen and examined. Clinical notes reviewed.  Telemetry reviewed / Pertinent labs, diagnostic, device, and imaging results reviewed as a part of this visit  I spent a total of 35 minutes and greater than 50% of the time was spent counseling with patient  coordinating care regarding her diagnosis, treatments and plan of care    Scheduled Meds:   sacubitril-valsartan  1 tablet Oral BID    spironolactone  25 mg Oral Daily    furosemide  40 mg Oral Daily    metoprolol succinate  25 mg Oral BID    potassium chloride  20 mEq Oral Daily with breakfast    atorvastatin  40 mg Oral Daily    oxybutynin  5 mg Oral Daily    tamsulosin  0.4 mg Oral Daily    sodium chloride flush  5-40 mL IntraVENous 2 times per day    enoxaparin  40 mg SubCUTAneous Daily    aspirin  81 mg Oral Daily     Vitals:    11/11/22 0800   BP: 125/75   Pulse: 65   Resp: 16   Temp: 97.5 °F (36.4 °C)   SpO2: 100%      In: 415 [P.O.:410; I.V.:5]  Out: 1100    Wt Readings from Last 3 Encounters:   11/11/22 130 lb 8.2 oz (59.2 kg)   10/26/22 154 lb 6.4 oz (70 kg)   10/05/22 156 lb 12.8 oz (71.1 kg)       Intake/Output Summary (Last 24 hours) at 11/11/2022 0936  Last data filed at 11/11/2022 0315  Gross per 24 hour   Intake 415 ml   Output 1100 ml   Net -685 ml       Telemetry: Personally Reviewed    Constitutional: Cooperative and in no apparent distress, and appears well nourished  Skin: Warm and pink; no pallor, cyanosis, clubbing, or bruising   HEENT: Symmetric and normocephalic. Cardiovascular: Regular rate and rhythm. S1/S2 present with murmur,  Respiratory: Respirations symmetric and unlabored. Lungs clear to auscultation bilaterally, no wheezing, crackles, or rhonchi  Gastrointestinal: Abdomen soft and round. Bowel sounds normoactive without tenderness or masses. Musculoskeletal: Bilateral upper and lower extremity strength 5/5 with full ROM  Neurologic/Psych: Awake and orientated to person, place and time. Calm affect, appropriate mood    Patient Active Problem List    Diagnosis Date Noted    COVID-19 virus infection 11/07/2022    Shortness of breath 11/05/2022    Acute congestive heart failure ([facility] [address] 11/05/2022    Tremor of right hand 06/13/2021    Balance disorder 06/13/2021    Impaired memory 06/13/2021    Primary osteoarthritis of left knee 04/21/2016    Contusion of left knee 04/21/2016    Prostate cancer ([facility] [address] 07/01/2015    Elevated PSA 01/18/2013    BPH (benign prostatic hyperplasia) 12/15/2011    Hypertension 09/18/2011    Hyperlipidemia 09/18/2011    CAD (coronary artery disease) 09/18/2011        Assessment     new onset CHF / HFrEF   continue IV Lasix  fluid restriction low salt diet /sCr wnl   Net -700ml   sCr wnl     ICM   11/2022 TTE  / EF 15-20%  moderate AS / m/m AR   Left ventricular cavity size is dilated. Normal left ventricular wall thickness. Overall left ventricular systolic function appears severely  reduced with an ejection fraction of 15-20%. There is severe diffuse hypokinesis. Diastolic filling parameters suggest grade I diastolic  dysfunction. Mild mitral regurgitation is present.  The aortic valve leaflets are slightly thickened /calcified. Mild-to-moderate aortic  regurgitation is present. Moderate aortic stenosis with a peak velocity of 3.05m/s and a mean pressure gradient of 23mmHg. Mild  tricuspid regurgitation. Estimated pulmonary artery systolic pressure is at 39 mmHg assuming a right atrial pressure of 15 mmHg. The right ventricle appears normal in size but hypokinetic. TAPSE measures 1.11cm and the RVS velocity measures 5.46 cm/s. There  is a left pleural effusion. IVC size is dilated (>2.1 cm) and collapses < 50% with respiration consistent with elevated RA pressure (15  mmHg).     COVID-19 positive  Managed per IM     CAD    Continue aspirin, statin     HTN   Controlled      BPH  Continue Flomax     Hypokalemia  Replace as needed     NSVT   Increase  Toprol 25 mg daily to BID     Plan   remains in Covid isolation   Hx CABG years ago with new noted EF15-20% in setting of Covid   no cp or SOB noted / near compensated / IV to oral lasix   Continue card meds asa / lipitor  / lasix  / entresto   / Toprol  / Ottis Bergamo con    aldactone lasix    plan to add SGLT2 / Imdur hydralazine as out pt if renals & b/p allows  Plan for Zoll life vest on discharge discussed & agreeable   Spoke with daughter, Elif Mckeon, discussed care & plan with her & Mr Rita Carter both amendable    Started HF meds & Zoll vest to be placed today  May be discharged & fu in 1-2 weeks as planned  At out pt visit will discuss ischemic work up type in setting of no cp or SOB with decreased EF, hx CAD & moderate  AR/AS   TTE in 3  consider ICD     Thank you for allowing to us to participate in the care of Hortencia ROMERO-CNP-CVNP    [facility]

## 2025-01-29 NOTE — DISCHARGE SUMMARY
[facility]         HOSPITALIST  DISCHARGE SUMMARY    Patient Name: Cm Flores  : 1950  MRN: [id number]    Date of Admission: 2025  Date of Discharge:  25  Primary Care Physician: Nancy Yeh DO    Consults       Date and Time Order Name Status Description    2025 12:43 PM Hospitalist (on-call MD unless specified)              Active and Resolved Hospital Problems:  Active Hospital Problems   No active problems to display.      Resolved Hospital Problems    Diagnosis POA    **Hypoxic respiratory failure [J96.91] Yes       Hospital Course     Hospital Course:  Cm Flores is a 74 y.o. male with COPD, hypertension who presents to the emergency room with shortness of breath, generalized weakness poor appetite, and muscle aches.  Patient found to have influenza A.  Patient is improving from a respiratory standpoint. He is breathing much better. Treated for concern for superimposed PNA with doxycycline and ceftriaxone.       DISCHARGE Follow Up Recommendations for labs and diagnostics:   -follow up with PCP      Day of Discharge     Vital Signs:  Temp:  [97.2 °F (36.2 °C)-99 °F (37.2 °C)] 98.8 °F (37.1 °C)  Heart Rate:  [] 94  Resp:  [18-20] 18  BP: (109-157)/(54-89) 120/60  Flow (L/min) (Oxygen Therapy):  [2] 2  Physical Exam:   GEN: NAD  Resp: much improved wheezing, faint end exp wheezes  CV: RRR  Neuro: alert and oriented       Discharge Details        Discharge Medications        New Medications        Instructions Start Date   cefdinir 300 MG capsule  Commonly known as: OMNICEF   300 mg, Oral, 2 Times Daily      doxycycline 100 MG capsule  Commonly known as: MONODOX   100 mg, Oral, Every 12 Hours Scheduled      oseltamivir 30 MG capsule  Commonly known as: TAMIFLU   30 mg, Oral, Every 12 Hours Scheduled             Continue These Medications        Instructions Start Date   ALPRAZolam 0.25 MG tablet  Commonly known as: XANAX   0.25 mg, Oral, Nightly PRN       metoprolol succinate XL 25 MG 24 hr tablet  Commonly known as: TOPROL-XL   25 mg, Oral, Daily               Allergies   Allergen Reactions    Azithromycin Unknown - High Severity     zpak    Quinolones Other (See Comments)     History of abdominal aortic aneurysm repair    Morphine Unknown - Low Severity    Penicillin G Unknown - Low Severity    Penicillins Unknown - Low Severity    Sulfa Antibiotics Unknown - Low Severity       Discharge Disposition:  home     Diet:  Hospital:  Diet Order   Procedures    Diet: Regular/House; Texture: Soft to Chew (NDD 3); Soft to Chew: Chopped Meat; Fluid Consistency: Thin (IDDSI 0)       Discharge Activity:   as tolerated     CODE STATUS:  Code Status and Medical Interventions: CPR (Attempt to Resuscitate); Full Support   Ordered at: 01/27/25 1319     Code Status (Patient has no pulse and is not breathing):    CPR (Attempt to Resuscitate)     Medical Interventions (Patient has pulse or is breathing):    Full Support         Future Appointments   Date Time Provider Department Center   7/24/2025  1:00 PM Nancy Yeh DO [facility]       Additional Instructions for the Follow-ups that You Need to Schedule       Discharge Follow-up with PCP   As directed       Currently Documented PCP:    Nancy Yeh DO    PCP Phone Number:    [phone]     Follow Up Details: 1 week                Pertinent  and/or Most Recent Results     LAB RESULTS:      Lab 01/29/25  0604 01/28/25  0515 01/27/25  1310 01/27/25  1139 01/27/25  1138   WBC 3.06* 4.48  --   --  3.70   HEMOGLOBIN 12.1* 12.6*  --   --  14.5   HEMATOCRIT 37.1* 38.7  --   --  41.8   PLATELETS 52* 52*  --   --  68*   NEUTROS ABS  --  3.41  --   --  2.02   IMMATURE GRANS (ABS)  --  0.03  --   --  0.03   LYMPHS ABS  --  0.71  --   --  1.20   MONOS ABS  --  0.32  --   --  0.42   EOS ABS  --  0.00  --   --  0.01   MCV 93.5 92.6  --   --  89.3   PROCALCITONIN  --   --  0.25  --   --    LACTATE  --   --   --  1.3  --          Lab  01/29/25  0604 01/28/25  0515 01/27/25  1138   SODIUM 130* 131* 125*   POTASSIUM 3.9 4.3 4.6   CHLORIDE 97* 98 88*   CO2 21.9* 22.4 24.8   ANION GAP 11.1 10.6 12.2   BUN 19 23 23   CREATININE 1.36* 1.50* 1.72*   EGFR 54.9* 48.9* 41.5*   GLUCOSE 87 80 90   CALCIUM 7.8* 7.5* 8.4*         Lab 01/27/25  1138   TOTAL PROTEIN 6.7   ALBUMIN 4.1   GLOBULIN 2.6   ALT (SGPT) 5   AST (SGOT) 26   BILIRUBIN 1.2   ALK PHOS 53         Lab 01/27/25  1310 01/27/25  1138   PROBNP  --  9,098.0*   HSTROP T 27* 29*                 Brief Urine Lab Results       None          Microbiology Results (last 10 days)       Procedure Component Value - Date/Time    Blood Culture - Blood, Arm, Right [[id number]]  (Normal) Collected: 01/27/25 1144    Lab Status: Preliminary result Specimen: Blood from Arm, Right Updated: 01/28/25 1200     Blood Culture No growth at 24 hours    COVID-19, FLU A/B, RSV PCR 1 HR TAT - Swab, Nasopharynx [[id number]]  (Abnormal) Collected: 01/27/25 1139    Lab Status: Final result Specimen: Swab from Nasopharynx Updated: 01/27/25 1233     COVID19 Not Detected     Influenza A PCR Detected     Influenza B PCR Not Detected     RSV, PCR Not Detected    Narrative:      Fact sheet for providers: https://www.fda.gov/media/603748/download    Fact sheet for patients: https://www.fda.gov/media/376134/download    Test performed by PCR.    Blood Culture - Blood, Arm, Left [[id number]]  (Normal) Collected: 01/27/25 1139    Lab Status: Preliminary result Specimen: Blood from Arm, Left Updated: 01/28/25 1200     Blood Culture No growth at 24 hours            XR Chest 1 View    Result Date: 1/27/2025  Impression: Left lower lung volume loss and trace left pleural effusion. Stable appearance to a 3 cm left hilar mass. Electronically Signed: Carlene Arroyo MD  1/27/2025 12:16 PM EST  Workstation ID: [id number]              Results for orders placed during the hospital encounter of 10/25/22    Adult Transthoracic Echo Complete W/ Cont if  Necessary Per Protocol    Interpretation Summary    Calculated left ventricular EF = 65%    Left ventricular diastolic function is consistent with (grade I) impaired relaxation.      Labs Pending at Discharge:  Pending Labs       Order Current Status    Blood Culture - Blood, Arm, Left Preliminary result    Blood Culture - Blood, Arm, Right Preliminary result              Time spent on Discharge including face to face service:  35 minutes    Electronically signed by Westley Ramon MD, 01/29/25, 9:20 AM EST.

## 2025-01-29 NOTE — PLAN OF CARE
Goal Outcome Evaluation:  Plan of Care Reviewed With: patient           Outcome Evaluation: Patient is able to complete all transfers and bed mobilities indepedently in the room. Pt is able to ambulate ad lenin in the room. Pt does not need inpatient PT services. Recommend DC home.    Anticipated Discharge Disposition (PT): home

## 2025-01-29 NOTE — PLAN OF CARE
Goal Outcome Evaluation:           Progress: improving  Outcome Evaluation: Patient A&Ox4, able to make needs known. Patient denies pain/discomfort throughout shift. Patient has rested quietly for most of shift. Patient recieved doxycycline abx, tolerating well. Patient remains on O2 2L via NC; O2sats stable. Patient ambulating to BSC without difficulty.

## 2025-01-29 NOTE — THERAPY EVALUATION
Acute Care - Physical Therapy Initial Evaluation  [facility]     Patient Name: Cm Flores  : 1950  MRN: [id number]  Today's Date: 2025      Visit Dx:     ICD-10-CM ICD-9-CM   1. Influenza A  J10.1 487.1   2. Dehydration with hyponatremia  E86.0 276.1    E87.1    3. Shortness of breath  R06.02 786.05   4. Difficulty walking  R26.2 719.7     Patient Active Problem List   Diagnosis    History of bladder cancer    Essential hypertension    Mixed hyperlipidemia    Primary insomnia    History of smoking    Periprosthetic fracture around internal prosthetic left hip joint    Myocardial infarction    Arteriosclerosis of coronary artery    Congestive heart failure    Thrombocytopenia    COPD, moderate    Medicare annual wellness visit, subsequent    ADAMA (generalized anxiety disorder)    Seasonal allergies    Body mass index (BMI) of 19 or less in adult    Pneumonia of right lower lobe due to infectious organism    Dyspnea    Stage 3a chronic kidney disease     Past Medical History:   Diagnosis Date    Anxiety     Arthritis     Bladder cancer     Bladder cancer     lining of bladder     Broken bones      as a child     CAD (coronary artery disease) 2014    Calculus of kidney     Cataracts, both eyes     CHF (congestive heart failure)     Constipation     Deafness     Depression     Essential hypertension 2021    Forgetfulness     Gallstones     Head injury     Heart disease     Hemorrhoids     Hernia of pelvic floor     High cholesterol     High cholesterol     History of bladder cancer 2021    History of heart attack     Hypertension     Kidney stones     Mixed hyperlipidemia 2021    Primary insomnia 2021    Shortness of breath     Sinus trouble     Skin disease     UNSURE WHAT KIND/ARMS    Tobacco abuse 2021     Past Surgical History:   Procedure Laterality Date    ABDOMINAL AORTIC ANEURYSM REPAIR      CAROTID ENDARTERECTOMY Right 2014    Dr. Boudreaux    CHOLECYSTECTOMY       CHOLECYSTECTOMY OPEN      CHOLECYSTECTOMY OPEN      CORONARY ARTERY BYPASS GRAFT      CYSTOSCOPY      Cystoscopy, Bladder BX 2004; Cysto, Urethral Dilattion, Bilateral Retrogrades, Biopsy & Fulguration, Left Ureteroscopy 01/17/2013    CYSTOSCOPY RETROGRADE PYELOGRAM Bilateral     EYE SURGERY      KNEE SURGERY Left     Repair    TOTAL HIP ARTHROPLASTY Left      PT Assessment (Last 12 Hours)       PT Evaluation and Treatment       Row Name 01/29/25 0930          Physical Therapy Time and Intention    Subjective Information no complaints  -DP     Document Type evaluation  -DP     Mode of Treatment individual therapy;physical therapy  -DP     Patient Effort good  -DP       Row Name 01/29/25 1300 01/29/25 0930       General Information    Patient Profile Reviewed -- yes  -DP    Patient Observations -- alert;cooperative;agree to therapy  -DP    Prior Level of Function -- independent:;gait;transfer;bed mobility;ADL's  -DP    Equipment Currently Used at Home none  -DP --    Existing Precautions/Restrictions -- no known precautions/restrictions  -DP    Barriers to Rehab -- none identified  -DP      Row Name 01/29/25 0930          Living Environment    Current Living Arrangements home  -DP     People in Home significant other  -DP       Row Name 01/29/25 0930          Cognition    Orientation Status (Cognition) oriented x 3  -DP       Row Name 01/29/25 0930          Range of Motion Comprehensive    General Range of Motion bilateral lower extremity ROM WFL  -DP       Row Name 01/29/25 0930          Strength (Manual Muscle Testing)    Strength (Manual Muscle Testing) bilateral lower extremities;strength is WFL  -DP       Row Name 01/29/25 0930          Bed Mobility    Bed Mobility supine-sit-supine  -DP     Supine-Sit-Supine [place] (Bed Mobility) independent  -DP       Row Name 01/29/25 0930          Transfers    Transfers sit-stand transfer  -DP       Row Name 01/29/25 0930          Sit-Stand Transfer    Sit-Stand  [place] (Transfers) independent  -DP       Row Name 01/29/25 0930          Gait/Stairs (Locomotion)    Gait/Stairs Locomotion gait/ambulation independence  -DP     [place] Level (Gait) modified independence  -DP     Comment, (Gait/Stairs) Pt able to ambulate ad lenin in his room  -DP       Row Name 01/29/25 0930          Balance    Balance Assessment standing dynamic balance  -DP     Dynamic Standing Balance supervision  -DP       Row Name 01/29/25 0930          Plan of Care Review    Plan of Care Reviewed With patient  -DP     Outcome Evaluation Patient is able to complete all transfers and bed mobilities indepedently in the room. Pt is able to ambulate ad lenin in the room. Pt does not need inpatient PT services. Recommend DC home.  -DP       Row Name 01/29/25 0930          Therapy Assessment/Plan (PT)    Criteria for Skilled Interventions Met (PT) no problems identified which require skilled intervention  -DP     Therapy Frequency (PT) evaluation only  -DP       Row Name 01/29/25 0930          PT Evaluation Complexity    History, PT Evaluation Complexity no personal factors and/or comorbidities  -DP     Examination of Body Systems (PT Eval Complexity) total of 4 or more elements  -DP     Clinical Presentation (PT Evaluation Complexity) stable  -DP     Clinical Decision Making (PT Evaluation Complexity) low complexity  -DP     Overall Complexity (PT Evaluation Complexity) low complexity  -DP       Row Name 01/29/25 0930          Physical Therapy Goals    Problem Specific Goal Selection (PT) problem specific goal 1, PT  -DP       Row Name 01/29/25 0930          Problem Specific Goal 1 (PT)    Problem Specific Goal 1 (PT) Complete PT evaluation.  -DP     Time Frame (Problem Specific Goal 1, PT) 1 day  -DP     Progress/Outcome (Problem Specific Goal 1, PT) goal met  -DP               User Key  (r) = Recorded By, (t) = Taken By, (c) = Cosigned By      Initials Name Provider Type    DP Tiffany Farias, PT  Physical Therapist                    Physical Therapy Education       Title: PT OT SLP Therapies (Resolved)       Topic: Physical Therapy (Resolved)       Point: Mobility training (Resolved)       Learner Progress:  Not documented in this visit.              Point: Home exercise program (Resolved)       Learner Progress:  Not documented in this visit.              Point: Body mechanics (Resolved)       Learner Progress:  Not documented in this visit.              Point: Precautions (Resolved)       Learner Progress:  Not documented in this visit.                                  PT Recommendation and Plan  Anticipated Discharge Disposition (PT): home  Therapy Frequency (PT): evaluation only  Plan of Care Reviewed With: patient  Outcome Evaluation: Patient is able to complete all transfers and bed mobilities indepedently in the room. Pt is able to ambulate ad lenin in the room. Pt does not need inpatient PT services. Recommend DC home.   Outcome Measures       Row Name 01/29/25 1300             How much help from another person do you currently need...    Turning from your back to your side while in flat bed without using bedrails? 4  -DP      Moving from lying on back to sitting on the side of a flat bed without bedrails? 4  -DP      Moving to and from a bed to a chair (including a wheelchair)? 4  -DP      Standing up from a chair using your arms (e.g., wheelchair, bedside chair)? 4  -DP      Climbing 3-5 steps with a railing? 4  -DP      To walk in hospital room? 4  -DP      AM-PAC 6 Clicks Score (PT) 24  -DP         Functional Assessment    Outcome Measure Options AM-PAC 6 Clicks Basic Mobility (PT)  -DP                User Key  (r) = Recorded By, (t) = Taken By, (c) = Cosigned By      Initials Name Provider Type    Tiffany Novak, PT Physical Therapist                     Time Calculation:    PT Charges       Row Name 01/29/25 1316             Time Calculation    PT Received On 01/29/25  -DP         Untimed  Charges    PT Eval/Re-eval Minutes 25  -DP         Total Minutes    Untimed Charges Total Minutes 25  -DP       Total Minutes 25  -DP                User Key  (r) = Recorded By, (t) = Taken By, (c) = Cosigned By      Initials Name Provider Type    Tiffany Novak, PT Physical Therapist                      PT G-Codes  Outcome Measure Options: AM-PAC 6 Clicks Basic Mobility (PT)  AM-PAC 6 Clicks Score (PT): 24    Tiffany Farias, PT  1/29/2025

## 2025-01-29 NOTE — PLAN OF CARE
Goal Outcome Evaluation:  Plan of Care Reviewed With: patient        Progress: improving  Outcome Evaluation: Patient AOx4.  2 L NC. Discharging home today.

## 2025-01-30 ENCOUNTER — TELEPHONE (OUTPATIENT)
Dept: FAMILY MEDICINE CLINIC | Facility: CLINIC | Age: 75
End: 2025-01-30

## 2025-01-30 ENCOUNTER — TRANSITIONAL CARE MANAGEMENT TELEPHONE ENCOUNTER (OUTPATIENT)
Dept: CALL CENTER | Facility: HOSPITAL | Age: 75
End: 2025-01-30
Payer: MEDICARE

## 2025-01-30 NOTE — OUTREACH NOTE
Call Center TCM Note      Flowsheet Row Responses   [facility] facility patient discharged from? [facility]   Does the patient have one of the following disease processes/diagnoses(primary or secondary)? Other   TCM attempt successful? No  [VARUN-Erasmo Flores]   Unsuccessful attempts Attempt 1            Shaista Nunez RN    1/30/2025, 15:50 EST

## 2025-01-30 NOTE — TELEPHONE ENCOUNTER
Caller: CARO NELSON (COUS[facility])    Relationship to patient: Emergency Contact    Best call back number: [phone]    Patient is needing: PATIENT'S POA CALLED IN AND WOULD LIKE TO HAVE A CALL BACK FROM DR. HOWELL WITH GUIDANCE. PATIENT'S POA SAID HE WAS IN THE HOSPITAL AND IS POSSIBLY NEEDING HOME HEALTH OR HELP AT HOME. PATIENT'S POA IS REQUESTING A CALL BACK PLEASE.

## 2025-01-30 NOTE — OUTREACH NOTE
Call Center TCM Note      Flowsheet Row Responses   [facility] patient discharged from? [facility]   Does the patient have one of the following disease processes/diagnoses(primary or secondary)? Other   TCM attempt successful? No   Unsuccessful attempts Attempt 2            Shaista Nunez RN    1/30/2025, 16:34 EST

## 2025-01-31 ENCOUNTER — TRANSITIONAL CARE MANAGEMENT TELEPHONE ENCOUNTER (OUTPATIENT)
Dept: CALL CENTER | Facility: HOSPITAL | Age: 75
End: 2025-01-31
Payer: MEDICARE

## 2025-01-31 NOTE — OUTREACH NOTE
Call Center TCM Note      Flowsheet Row Responses   [facility] patient discharged from? [facility]   Does the patient have one of the following disease processes/diagnoses(primary or secondary)? Other   TCM attempt successful? No  [no names on verbal]   Unsuccessful attempts Attempt 3            Fatoumata Paiz RN    1/31/2025, 12:29 EST

## 2025-02-01 LAB
BACTERIA SPEC AEROBE CULT: NORMAL
BACTERIA SPEC AEROBE CULT: NORMAL

## 2025-02-04 ENCOUNTER — OFFICE VISIT (OUTPATIENT)
Dept: FAMILY MEDICINE CLINIC | Facility: CLINIC | Age: 75
End: 2025-02-04
Payer: MEDICARE

## 2025-02-04 VITALS
OXYGEN SATURATION: 92 % | DIASTOLIC BLOOD PRESSURE: 71 MMHG | BODY MASS INDEX: 17.28 KG/M2 | HEART RATE: 73 BPM | WEIGHT: 127.6 LBS | HEIGHT: 72 IN | SYSTOLIC BLOOD PRESSURE: 116 MMHG | RESPIRATION RATE: 18 BRPM | TEMPERATURE: 96.8 F

## 2025-02-04 DIAGNOSIS — I10 ESSENTIAL HYPERTENSION: Chronic | ICD-10-CM

## 2025-02-04 DIAGNOSIS — N18.31 STAGE 3A CHRONIC KIDNEY DISEASE: Chronic | ICD-10-CM

## 2025-02-04 DIAGNOSIS — J18.9 PNEUMONIA OF RIGHT LOWER LOBE DUE TO INFECTIOUS ORGANISM: ICD-10-CM

## 2025-02-04 DIAGNOSIS — I20.9 CARDIAC ANGINA: ICD-10-CM

## 2025-02-04 DIAGNOSIS — Z09 HOSPITAL DISCHARGE FOLLOW-UP: Primary | ICD-10-CM

## 2025-02-04 DIAGNOSIS — J44.9 COPD, MODERATE: Chronic | ICD-10-CM

## 2025-02-04 PROCEDURE — 3074F SYST BP LT 130 MM HG: CPT | Performed by: FAMILY MEDICINE

## 2025-02-04 PROCEDURE — 1160F RVW MEDS BY RX/DR IN RCRD: CPT | Performed by: FAMILY MEDICINE

## 2025-02-04 PROCEDURE — 1126F AMNT PAIN NOTED NONE PRSNT: CPT | Performed by: FAMILY MEDICINE

## 2025-02-04 PROCEDURE — 1111F DSCHRG MED/CURRENT MED MERGE: CPT | Performed by: FAMILY MEDICINE

## 2025-02-04 PROCEDURE — 1159F MED LIST DOCD IN RCRD: CPT | Performed by: FAMILY MEDICINE

## 2025-02-04 PROCEDURE — 3078F DIAST BP <80 MM HG: CPT | Performed by: FAMILY MEDICINE

## 2025-02-04 PROCEDURE — 99495 TRANSJ CARE MGMT MOD F2F 14D: CPT | Performed by: FAMILY MEDICINE

## 2025-02-04 RX ORDER — IPRATROPIUM BROMIDE AND ALBUTEROL SULFATE 2.5; .5 MG/3ML; MG/3ML
3 SOLUTION RESPIRATORY (INHALATION) EVERY 4 HOURS PRN
Qty: 150 ML | Refills: 1 | Status: SHIPPED | OUTPATIENT
Start: 2025-02-04

## 2025-02-04 RX ORDER — NITROGLYCERIN 0.4 MG/1
0.4 TABLET SUBLINGUAL
COMMUNITY
End: 2025-02-04 | Stop reason: SDUPTHER

## 2025-02-04 RX ORDER — GUAIFENESIN 600 MG/1
1200 TABLET, EXTENDED RELEASE ORAL 2 TIMES DAILY
Qty: 28 TABLET | Refills: 0 | Status: SHIPPED | OUTPATIENT
Start: 2025-02-04

## 2025-02-04 RX ORDER — NITROGLYCERIN 0.4 MG/1
0.4 TABLET SUBLINGUAL
Qty: 25 TABLET | Refills: 2 | Status: SHIPPED | OUTPATIENT
Start: 2025-02-04

## 2025-02-04 NOTE — PROGRESS NOTES
Transitional Care Follow Up Visit  Subjective     Cm Flores is a 74 y.o. male who presents for a transitional care management visit.    Within 48 business hours after discharge our office contacted him via telephone to coordinate his care and needs.      I reviewed and discussed the details of that call along with the discharge summary, hospital problems, inpatient lab results, inpatient diagnostic studies, and consultation reports with Cm.     Current outpatient and discharge medications have been reconciled for the patient.  Reviewed by: Nancy Yeh DO          1/29/2025     5:50 PM   Date of TCM Phone Call   Psychiatric   Date of Admission 1/27/2025   Date of Discharge 1/29/2025   Discharge Disposition Home or Self Care     Risk for Readmission (LACE) Score: 11 (1/29/2025  6:00 AM)      History of Present Illness   Course During Hospital Stay:  Cm Flores is a 74 y.o. male with COPD, hypertension who presents to the emergency room with shortness of breath, generalized weakness poor appetite, and muscle aches.  Patient found to have influenza A.  Patient is improving from a respiratory standpoint. He is breathing much better. Treated for concern for superimposed PNA with doxycycline and ceftriaxone.      The following portions of the patient's history were reviewed and updated as appropriate: allergies, current medications, past family history, past medical history, past social history, past surgical history, and problem list.    Review of Systems   HENT:  Positive for congestion.    Eyes:  Negative for visual disturbance.   Respiratory:  Positive for cough, chest tightness, shortness of breath and wheezing.    Gastrointestinal:  Negative for constipation.   Genitourinary:  Negative for difficulty urinating.   Allergic/Immunologic: Negative for immunocompromised state.   Hematological:  Negative for adenopathy.   Psychiatric/Behavioral:  Negative for agitation, behavioral problems  "and confusion.        Objective   /71 (BP Location: Left arm, Patient Position: Sitting, Cuff Size: Adult)   Pulse 73   Temp 96.8 °F (36 °C)   Resp 18   Ht 182.9 cm (72\")   Wt 57.9 kg (127 lb 9.6 oz)   SpO2 92% Comment: 2 L o2 Continous  BMI 17.31 kg/m²   Physical Exam  Vitals reviewed.   Constitutional:       Appearance: He is well-developed and underweight.   HENT:      Head: Normocephalic and atraumatic.      Right Ear: External ear normal.      Left Ear: External ear normal.      Mouth/Throat:      Pharynx: No oropharyngeal exudate.   Eyes:      Conjunctiva/sclera: Conjunctivae normal.      Pupils: Pupils are equal, round, and reactive to light.   Neck:      Vascular: No carotid bruit.   Cardiovascular:      Rate and Rhythm: Normal rate and regular rhythm.      Heart sounds: No murmur heard.     No friction rub. No gallop.   Pulmonary:      Effort: Pulmonary effort is normal.      Breath sounds: Examination of the right-upper field reveals rhonchi. Examination of the right-middle field reveals rhonchi. Examination of the right-lower field reveals rhonchi. Decreased breath sounds present. No wheezing or rhonchi.   Abdominal:      General: There is no distension.   Skin:     General: Skin is warm and dry.   Neurological:      Mental Status: He is alert and oriented to person, place, and time.      Cranial Nerves: No cranial nerve deficit.      Motor: No weakness.   Psychiatric:         Mood and Affect: Mood and affect normal.         Behavior: Behavior normal.         Thought Content: Thought content normal.         Judgment: Judgment normal.         Assessment & Plan   Diagnoses and all orders for this visit:    1. Hospital discharge follow-up (Primary)    2. Cardiac angina  -     nitroglycerin (NITROSTAT) 0.4 MG SL tablet; Place 1 tablet under the tongue Every 5 (Five) Minutes As Needed for Chest Pain. Take no more than 3 doses in 15 minutes.  Dispense: 25 tablet; Refill: 2    3. Essential " hypertension  Assessment & Plan:  Hypertension is stable and controlled  Continue current treatment regimen.  Dietary sodium restriction.  Blood pressure will be reassessed in 6 months.          4. COPD, moderate  -     ipratropium-albuterol (DUO-NEB) 0.5-2.5 mg/3 ml nebulizer; Take 3 mL by nebulization Every 4 (Four) Hours As Needed for Wheezing.  Dispense: 150 mL; Refill: 1  -     guaiFENesin (Mucinex) 600 MG 12 hr tablet; Take 2 tablets by mouth 2 (Two) Times a Day.  Dispense: 28 tablet; Refill: 0    5. Pneumonia of right lower lobe due to infectious organism  Assessment & Plan:  Improving with antibiotics. He was given duonebs and mucinex today to help him clear out his right lung congestion. Close follow-up in two weeks.       6. Stage 3a chronic kidney disease  Assessment & Plan:  Renal condition is worsening.  Continue current treatment regimen.  Renal condition will be reassessed in 6 months.

## 2025-02-04 NOTE — ASSESSMENT & PLAN NOTE
Hypertension is stable and controlled  Continue current treatment regimen.  Dietary sodium restriction.  Blood pressure will be reassessed in 6 months.

## 2025-02-04 NOTE — ASSESSMENT & PLAN NOTE
Improving with antibiotics. He was given duonebs and mucinex today to help him clear out his right lung congestion. Close follow-up in two weeks.

## 2025-02-09 LAB
QT INTERVAL: 344 MS
QTC INTERVAL: 430 MS

## 2025-02-10 ENCOUNTER — TELEPHONE (OUTPATIENT)
Dept: FAMILY MEDICINE CLINIC | Facility: CLINIC | Age: 75
End: 2025-02-10
Payer: MEDICARE

## 2025-02-10 DIAGNOSIS — J44.9 COPD, MODERATE: Primary | Chronic | ICD-10-CM

## 2025-02-10 DIAGNOSIS — J96.01 ACUTE RESPIRATORY FAILURE WITH HYPOXIA: ICD-10-CM

## 2025-02-10 NOTE — TELEPHONE ENCOUNTER
Caller: Cm Flores    Relationship: Self    Best call back number: [phone]    What is the medical concern/diagnosis: RECURRING LUNG ISSUES, OXYGEN TANK ISSUES     What specialty or service is being requested: PULMONOLOGY     What is the provider, practice or medical service name: [facility]     What is the office location: [address] [place] [address]    What is the office phone number: [phone]

## 2025-02-18 ENCOUNTER — OFFICE VISIT (OUTPATIENT)
Dept: FAMILY MEDICINE CLINIC | Facility: CLINIC | Age: 75
End: 2025-02-18
Payer: MEDICARE

## 2025-02-18 VITALS
RESPIRATION RATE: 21 BRPM | HEART RATE: 66 BPM | DIASTOLIC BLOOD PRESSURE: 53 MMHG | SYSTOLIC BLOOD PRESSURE: 122 MMHG | HEIGHT: 72 IN | BODY MASS INDEX: 17.23 KG/M2 | WEIGHT: 127.2 LBS | OXYGEN SATURATION: 94 % | TEMPERATURE: 98 F

## 2025-02-18 DIAGNOSIS — J96.11 CHRONIC HYPOXIC RESPIRATORY FAILURE: ICD-10-CM

## 2025-02-18 DIAGNOSIS — J44.9 COPD, MODERATE: Chronic | ICD-10-CM

## 2025-02-18 DIAGNOSIS — I10 ESSENTIAL HYPERTENSION: Chronic | ICD-10-CM

## 2025-02-18 DIAGNOSIS — J20.8 ACUTE BACTERIAL BRONCHITIS: Primary | ICD-10-CM

## 2025-02-18 DIAGNOSIS — B96.89 ACUTE BACTERIAL BRONCHITIS: Primary | ICD-10-CM

## 2025-02-18 PROBLEM — J18.9 PNEUMONIA OF RIGHT LOWER LOBE DUE TO INFECTIOUS ORGANISM: Status: RESOLVED | Noted: 2024-06-25 | Resolved: 2025-02-18

## 2025-02-18 PROBLEM — Z09 HOSPITAL DISCHARGE FOLLOW-UP: Status: RESOLVED | Noted: 2022-11-07 | Resolved: 2025-02-18

## 2025-02-18 PROCEDURE — G2211 COMPLEX E/M VISIT ADD ON: HCPCS | Performed by: FAMILY MEDICINE

## 2025-02-18 PROCEDURE — 3074F SYST BP LT 130 MM HG: CPT | Performed by: FAMILY MEDICINE

## 2025-02-18 PROCEDURE — 99214 OFFICE O/P EST MOD 30 MIN: CPT | Performed by: FAMILY MEDICINE

## 2025-02-18 PROCEDURE — 1160F RVW MEDS BY RX/DR IN RCRD: CPT | Performed by: FAMILY MEDICINE

## 2025-02-18 PROCEDURE — 3078F DIAST BP <80 MM HG: CPT | Performed by: FAMILY MEDICINE

## 2025-02-18 PROCEDURE — 1159F MED LIST DOCD IN RCRD: CPT | Performed by: FAMILY MEDICINE

## 2025-02-18 PROCEDURE — 1126F AMNT PAIN NOTED NONE PRSNT: CPT | Performed by: FAMILY MEDICINE

## 2025-02-18 RX ORDER — PREDNISONE 10 MG/1
TABLET ORAL
Qty: 13 TABLET | Refills: 0 | Status: SHIPPED | OUTPATIENT
Start: 2025-02-18 | End: 2025-02-28

## 2025-02-18 RX ORDER — DOXYCYCLINE 100 MG/1
100 CAPSULE ORAL 2 TIMES DAILY
Qty: 20 CAPSULE | Refills: 0 | Status: SHIPPED | OUTPATIENT
Start: 2025-02-18

## 2025-02-18 NOTE — PROGRESS NOTES
"Chief Complaint  Generalized Body Aches and Fatigue    Subjective        Cm Flores presents to [facility]  History of Present Illness  He is here today for follow-up for the management of his acute and chronic medical condition.  He does not have any children.  He has a history of kidney stones.  He has coronary artery disease, peripheral vascular disease, tobacco abuse, history of AAA repair in 2016 and bladder cancer in 2008.     He has a 50 pack year smoking habit and he stopped smoking last year.  He was offered a low-dose CT scan today but refused.     He has been managed by hematology for thrombocytopenia.     His weight is stable since his last visit.      He is on home 02. He is asking for an inogen device today.     The patient has no complaints today and denies chest pain, shortness of breath, weakness, numbness, nausea, vomiting, diarrhea, dizziness or syncopal event        Objective   Vital Signs:  /53 (BP Location: Right arm)   Pulse 66   Temp 98 °F (36.7 °C) (Temporal)   Resp 21   Ht 182.9 cm (72.01\")   Wt 57.7 kg (127 lb 3.2 oz)   SpO2 94%   BMI 17.25 kg/m²   Estimated body mass index is 17.25 kg/m² as calculated from the following:    Height as of this encounter: 182.9 cm (72.01\").    Weight as of this encounter: 57.7 kg (127 lb 3.2 oz).            Physical Exam  Vitals reviewed.   Constitutional:       Appearance: He is well-developed and underweight.   HENT:      Head: Normocephalic and atraumatic.      Right Ear: External ear normal.      Left Ear: External ear normal.      Mouth/Throat:      Pharynx: No oropharyngeal exudate.   Eyes:      Conjunctiva/sclera: Conjunctivae normal.      Pupils: Pupils are equal, round, and reactive to light.   Neck:      Vascular: No carotid bruit.   Cardiovascular:      Rate and Rhythm: Normal rate and regular rhythm.      Heart sounds: No murmur heard.     No friction rub. No gallop.   Pulmonary:      Effort: " Pulmonary effort is normal.      Breath sounds: Normal breath sounds. Decreased air movement present. No wheezing or rhonchi.   Abdominal:      General: There is no distension.   Skin:     General: Skin is warm and dry.   Neurological:      Mental Status: He is alert and oriented to person, place, and time.      Cranial Nerves: No cranial nerve deficit.      Motor: No weakness.   Psychiatric:         Mood and Affect: Mood and affect normal.         Behavior: Behavior normal.         Thought Content: Thought content normal.         Judgment: Judgment normal.        Result Review :    CMP          1/27/2025    11:38 1/28/2025    05:15 1/29/2025    06:04   CMP   Glucose 90  80  87    BUN 23  23  19    Creatinine 1.72  1.50  1.36    EGFR 41.5  48.9  54.9    Sodium 125  131  130    Potassium 4.6  4.3  3.9    Chloride 88  98  97    Calcium 8.4  7.5  7.8    Total Protein 6.7      Albumin 4.1      Globulin 2.6      Total Bilirubin 1.2      Alkaline Phosphatase 53      AST (SGOT) 26      ALT (SGPT) 5      Albumin/Globulin Ratio 1.6      BUN/Creatinine Ratio 13.4  15.3  14.0    Anion Gap 12.2  10.6  11.1      CBC          1/27/2025    11:38 1/28/2025    05:15 1/29/2025    06:04   CBC   WBC 3.70  4.48  3.06    RBC 4.68  4.18  3.97    Hemoglobin 14.5  12.6  12.1    Hematocrit 41.8  38.7  37.1    MCV 89.3  92.6  93.5    MCH 31.0  30.1  30.5    MCHC 34.7  32.6  32.6    RDW 14.4  14.7  14.3    Platelets 68  52  52                    Assessment and Plan   Diagnoses and all orders for this visit:    1. Acute bacterial bronchitis (Primary)  -     doxycycline (VIBRAMYCIN) 100 MG capsule; Take 1 capsule by mouth 2 (Two) Times a Day.  Dispense: 20 capsule; Refill: 0  -     predniSONE (DELTASONE) 10 MG tablet; Take 2 tablets by mouth Daily for 3 days, THEN 1 tablet Daily for 7 days.  Dispense: 13 tablet; Refill: 0  -     CBC w AUTO Differential; Future  -     Comprehensive metabolic panel; Future  -     Procalcitonin; Future    2. COPD,  moderate  Comments:  He was given a weeks worth of low dose prednisone.  Assessment & Plan:  COPD is stable.    Plan:  Continue same medication/s without change.    Discussed medication dosage, use, side effects, and goals of treatment in detail.    Warning signs of respiratory distress were reviewed with the patient.   Discussed distinction between quick-relief and maintenance control medications..    Patient Treatment Goals:   symptom prevention, minimizing limitation in activity, prevention of exacerbations and use of ER/inpatient care, maintenance of optimal pulmonary function, and minimization of adverse effects of treatment    Followup in 6 months.    Orders:  -     Miscellaneous DME    3. Chronic hypoxic respiratory failure  Comments:  His breathing is stable on 2 liters home 02. He needs an inogen for portable use due to being too frail to back a big bottle around.  Orders:  -     Miscellaneous DME    4. Essential hypertension  Assessment & Plan:  Hypertension is stable and controlled  Continue current treatment regimen.  Dietary sodium restriction.  Blood pressure will be reassessed in 6 months.                   Follow Up   Return in about 3 months (around 5/18/2025).  Patient was given instructions and counseling regarding his condition or for health maintenance advice. Please see specific information pulled into the AVS if appropriate.

## 2025-02-20 PROBLEM — J06.9 BACTERIAL URI: Status: ACTIVE | Noted: 2025-02-20

## 2025-02-20 PROBLEM — J96.11 CHRONIC HYPOXIC RESPIRATORY FAILURE: Status: ACTIVE | Noted: 2025-01-27

## 2025-02-20 PROBLEM — B96.89 BACTERIAL URI: Status: ACTIVE | Noted: 2025-02-20

## 2025-02-21 NOTE — ASSESSMENT & PLAN NOTE
COPD is stable.    Plan:  Continue same medication/s without change.    Discussed medication dosage, use, side effects, and goals of treatment in detail.    Warning signs of respiratory distress were reviewed with the patient.   Discussed distinction between quick-relief and maintenance control medications..    Patient Treatment Goals:   symptom prevention, minimizing limitation in activity, prevention of exacerbations and use of ER/inpatient care, maintenance of optimal pulmonary function, and minimization of adverse effects of treatment    Followup in 6 months.

## 2025-02-28 RX ORDER — METOPROLOL SUCCINATE 25 MG/1
25 TABLET, EXTENDED RELEASE ORAL DAILY
Qty: 90 TABLET | Refills: 0 | Status: SHIPPED | OUTPATIENT
Start: 2025-02-28

## 2025-02-28 NOTE — TELEPHONE ENCOUNTER
Caller: Cm Flores NICHOLAS    Relationship: Self    Best call back number: [phone]     Requested Prescriptions:   Requested Prescriptions     Pending Prescriptions Disp Refills    metoprolol succinate XL (TOPROL-XL) 25 MG 24 hr tablet 90 tablet 0     Sig: Take 1 tablet by mouth Daily.        Pharmacy where request should be sent: [address] [phone]  - [phone] [facility]     Last office visit with prescribing clinician: 2/18/2025   Last telemedicine visit with prescribing clinician: Visit date not found   Next office visit with prescribing clinician: 5/22/2025     Additional details provided by patient: PATIENT TOOK LAST DOSE THIS MORNING.     Does the patient have less than a 3 day supply:  [x] Yes  [] No    Would you like a call back once the refill request has been completed: [] Yes [x] No    If the office needs to give you a call back, can they leave a voicemail: [] Yes [x] No    Francisco Dill Rep   02/28/25 08:37 EST

## 2025-03-03 ENCOUNTER — TELEPHONE (OUTPATIENT)
Dept: FAMILY MEDICINE CLINIC | Facility: CLINIC | Age: 75
End: 2025-03-03
Payer: MEDICARE

## 2025-03-03 DIAGNOSIS — J44.1 COPD WITH EXACERBATION: ICD-10-CM

## 2025-03-03 DIAGNOSIS — J96.11 CHRONIC HYPOXIC RESPIRATORY FAILURE: Primary | ICD-10-CM

## 2025-03-03 NOTE — TELEPHONE ENCOUNTER
Caller: Cm Flores    Relationship: Self    Best call back number: [phone]     What medication are you requesting: PORTABLE OXYGEN MACHINE  (INOGEN)    If a prescription is needed, what is your preferred pharmacy and phone number:        Additional notes: PATIENT STATES PROVIDER WAS SUPPOSED TO CALL THIS PRESCRIPTION IN WHEN HE MET WITH THE PROVIDER 2.18.25.  PATIENT IS TRYING TO ORDER STRAIGHT FROM THE INOGEN COMPANY    PLEASE CALL THE PATIENT TO ADVISE ON HOW TO PROCEED

## 2025-03-03 NOTE — TELEPHONE ENCOUNTER
Left VM for patient to call back, need to see if [employer] has profile on him and ready for prescription, tried calling [employer] and they would only speak with patient

## 2025-03-03 NOTE — TELEPHONE ENCOUNTER
Provider: CATRACHITO HOWELL     Caller: Cm Flores    Relationship to Patient: Self         Phone Number:   Telephone Information:   Mobile [phone]         Reason for Call:       THE PATIENT SAID THAT  [facility] FROM [place] WILL  BE CALLING TO SEND OVER SEND OVER PAPERWORK AND TO BE COMPLETED AND SENT BACK     [phone]

## 2025-03-05 NOTE — TELEPHONE ENCOUNTER
PATIENT IS CALLING RETURNING A PHONE CALL TO THE OFFICE FOR A MISSED CALL NO ENCOUNTER TO FOLLOW.

## 2025-04-01 ENCOUNTER — APPOINTMENT (OUTPATIENT)
Dept: GENERAL RADIOLOGY | Facility: HOSPITAL | Age: 75
End: 2025-04-01
Payer: MEDICARE

## 2025-04-01 ENCOUNTER — HOSPITAL ENCOUNTER (EMERGENCY)
Facility: HOSPITAL | Age: 75
Discharge: HOME | End: 2025-04-02
Attending: EMERGENCY MEDICINE
Payer: MEDICARE

## 2025-04-01 DIAGNOSIS — J44.1 ACUTE EXACERBATION OF CHRONIC OBSTRUCTIVE PULMONARY DISEASE (COPD): Primary | ICD-10-CM

## 2025-04-01 DIAGNOSIS — R53.1 WEAKNESS: ICD-10-CM

## 2025-04-01 DIAGNOSIS — J96.11 CHRONIC RESPIRATORY FAILURE WITH HYPOXIA: ICD-10-CM

## 2025-04-01 DIAGNOSIS — E86.0 DEHYDRATION: ICD-10-CM

## 2025-04-01 DIAGNOSIS — E87.1 HYPONATREMIA: ICD-10-CM

## 2025-04-01 LAB
ALBUMIN SERPL-MCNC: 3.7 G/DL (ref 3.5–5.2)
ALBUMIN/GLOB SERPL: 1.2 G/DL
ALP SERPL-CCNC: 62 U/L (ref 39–117)
ALT SERPL W P-5'-P-CCNC: <5 U/L (ref 1–41)
ANION GAP SERPL CALCULATED.3IONS-SCNC: 11.4 MMOL/L (ref 5–15)
AST SERPL-CCNC: 16 U/L (ref 1–40)
BASOPHILS # BLD AUTO: 0.02 10*3/MM3 (ref 0–0.2)
BASOPHILS NFR BLD AUTO: 0.3 % (ref 0–1.5)
BILIRUB SERPL-MCNC: 1.6 MG/DL (ref 0–1.2)
BUN SERPL-MCNC: 18 MG/DL (ref 8–23)
BUN/CREAT SERPL: 11.8 (ref 7–25)
CALCIUM SPEC-SCNC: 8.9 MG/DL (ref 8.6–10.5)
CHLORIDE SERPL-SCNC: 85 MMOL/L (ref 98–107)
CO2 SERPL-SCNC: 24.6 MMOL/L (ref 22–29)
CREAT SERPL-MCNC: 1.53 MG/DL (ref 0.76–1.27)
DEPRECATED RDW RBC AUTO: 49.1 FL (ref 37–54)
EGFRCR SERPLBLD CKD-EPI 2021: 47.7 ML/MIN/1.73
EOSINOPHIL # BLD AUTO: 0.03 10*3/MM3 (ref 0–0.4)
EOSINOPHIL NFR BLD AUTO: 0.4 % (ref 0.3–6.2)
ERYTHROCYTE [DISTWIDTH] IN BLOOD BY AUTOMATED COUNT: 14.7 % (ref 12.3–15.4)
FLUAV RNA RESP QL NAA+PROBE: NOT DETECTED
FLUBV RNA RESP QL NAA+PROBE: NOT DETECTED
GEN 5 1HR TROPONIN T REFLEX: 24 NG/L
GLOBULIN UR ELPH-MCNC: 3 GM/DL
GLUCOSE SERPL-MCNC: 102 MG/DL (ref 65–99)
HCT VFR BLD AUTO: 35.6 % (ref 37.5–51)
HGB BLD-MCNC: 12.5 G/DL (ref 13–17.7)
HOLD SPECIMEN: NORMAL
HOLD SPECIMEN: NORMAL
IMM GRANULOCYTES # BLD AUTO: 0.05 10*3/MM3 (ref 0–0.05)
IMM GRANULOCYTES NFR BLD AUTO: 0.7 % (ref 0–0.5)
LYMPHOCYTES # BLD AUTO: 1.92 10*3/MM3 (ref 0.7–3.1)
LYMPHOCYTES NFR BLD AUTO: 25.9 % (ref 19.6–45.3)
MAGNESIUM SERPL-MCNC: 1.7 MG/DL (ref 1.6–2.4)
MCH RBC QN AUTO: 31.7 PG (ref 26.6–33)
MCHC RBC AUTO-ENTMCNC: 35.1 G/DL (ref 31.5–35.7)
MCV RBC AUTO: 90.4 FL (ref 79–97)
MONOCYTES # BLD AUTO: 0.69 10*3/MM3 (ref 0.1–0.9)
MONOCYTES NFR BLD AUTO: 9.3 % (ref 5–12)
NEUTROPHILS NFR BLD AUTO: 4.7 10*3/MM3 (ref 1.7–7)
NEUTROPHILS NFR BLD AUTO: 63.4 % (ref 42.7–76)
NRBC BLD AUTO-RTO: 0 /100 WBC (ref 0–0.2)
PLATELET # BLD AUTO: 133 10*3/MM3 (ref 140–450)
PMV BLD AUTO: 8.7 FL (ref 6–12)
POTASSIUM SERPL-SCNC: 4.3 MMOL/L (ref 3.5–5.2)
PROT SERPL-MCNC: 6.7 G/DL (ref 6–8.5)
RBC # BLD AUTO: 3.94 10*6/MM3 (ref 4.14–5.8)
RSV RNA RESP QL NAA+PROBE: NOT DETECTED
SARS-COV-2 RNA RESP QL NAA+PROBE: NOT DETECTED
SODIUM SERPL-SCNC: 121 MMOL/L (ref 136–145)
TROPONIN T % DELTA: -4
TROPONIN T NUMERIC DELTA: -1 NG/L
TROPONIN T SERPL HS-MCNC: 25 NG/L
WBC NRBC COR # BLD AUTO: 7.41 10*3/MM3 (ref 3.4–10.8)
WHOLE BLOOD HOLD COAG: NORMAL
WHOLE BLOOD HOLD SPECIMEN: NORMAL

## 2025-04-01 PROCEDURE — 93010 ELECTROCARDIOGRAM REPORT: CPT | Performed by: SPECIALIST

## 2025-04-01 PROCEDURE — 84484 ASSAY OF TROPONIN QUANT: CPT

## 2025-04-01 PROCEDURE — 71045 X-RAY EXAM CHEST 1 VIEW: CPT

## 2025-04-01 PROCEDURE — 36415 COLL VENOUS BLD VENIPUNCTURE: CPT

## 2025-04-01 PROCEDURE — 83735 ASSAY OF MAGNESIUM: CPT

## 2025-04-01 PROCEDURE — 80053 COMPREHEN METABOLIC PANEL: CPT

## 2025-04-01 PROCEDURE — 87637 SARSCOV2&INF A&B&RSV AMP PRB: CPT

## 2025-04-01 PROCEDURE — 99284 EMERGENCY DEPT VISIT MOD MDM: CPT

## 2025-04-01 PROCEDURE — 93005 ELECTROCARDIOGRAM TRACING: CPT | Performed by: EMERGENCY MEDICINE

## 2025-04-01 PROCEDURE — 85025 COMPLETE CBC W/AUTO DIFF WBC: CPT

## 2025-04-01 PROCEDURE — 25810000003 SODIUM CHLORIDE 0.9 % SOLUTION: Performed by: EMERGENCY MEDICINE

## 2025-04-01 PROCEDURE — 84484 ASSAY OF TROPONIN QUANT: CPT | Performed by: EMERGENCY MEDICINE

## 2025-04-01 RX ORDER — SODIUM CHLORIDE 0.9 % (FLUSH) 0.9 %
10 SYRINGE (ML) INJECTION AS NEEDED
Status: DISCONTINUED | OUTPATIENT
Start: 2025-04-01 | End: 2025-04-02 | Stop reason: HOSPADM

## 2025-04-01 RX ADMIN — SODIUM CHLORIDE 1000 ML: 9 INJECTION, SOLUTION INTRAVENOUS at 23:14

## 2025-04-02 VITALS
SYSTOLIC BLOOD PRESSURE: 119 MMHG | BODY MASS INDEX: 17.96 KG/M2 | DIASTOLIC BLOOD PRESSURE: 58 MMHG | HEIGHT: 71 IN | WEIGHT: 128.31 LBS | TEMPERATURE: 98.4 F | HEART RATE: 97 BPM | OXYGEN SATURATION: 94 % | RESPIRATION RATE: 26 BRPM

## 2025-04-02 LAB
ARTERIAL PATENCY WRIST A: POSITIVE
ATMOSPHERIC PRESS: 743.1 MMHG
BACTERIA UR QL AUTO: NORMAL /HPF
BASE EXCESS BLDA CALC-SCNC: -3.5 MMOL/L (ref -2–2)
BDY SITE: ABNORMAL
BILIRUB UR QL STRIP: NEGATIVE
CA-I BLDA-SCNC: 1.05 MMOL/L (ref 1.13–1.32)
CHLORIDE BLDA-SCNC: 93 MMOL/L (ref 98–107)
CLARITY UR: CLEAR
COLOR UR: ABNORMAL
D-LACTATE SERPL-SCNC: 0.8 MMOL/L
GAS FLOW AIRWAY: 2 LPM
GLUCOSE BLDC GLUCOMTR-MCNC: 91 MG/DL (ref 70–99)
GLUCOSE UR STRIP-MCNC: NEGATIVE MG/DL
HCO3 BLDA-SCNC: 21.1 MMOL/L (ref 22–26)
HCT VFR BLD CALC: 33 % (ref 38–51)
HEMODILUTION: NO
HGB BLDA-MCNC: 11.3 G/DL (ref 12–18)
HGB UR QL STRIP.AUTO: NEGATIVE
HYALINE CASTS UR QL AUTO: NORMAL /LPF
INHALED O2 CONCENTRATION: 28 %
KETONES UR QL STRIP: ABNORMAL
LEUKOCYTE ESTERASE UR QL STRIP.AUTO: NEGATIVE
MODALITY: ABNORMAL
NITRITE UR QL STRIP: NEGATIVE
PCO2 BLDA: 35.3 MM HG (ref 35–45)
PH BLDA: 7.38 PH UNITS (ref 7.35–7.45)
PH UR STRIP.AUTO: 6 [PH] (ref 5–8)
PO2 BLD: 293 MM[HG] (ref 0–500)
PO2 BLDA: 81.9 MM HG (ref 80–100)
POTASSIUM BLDA-SCNC: 4 MMOL/L (ref 3.5–5)
PROT UR QL STRIP: ABNORMAL
QT INTERVAL: 345 MS
QTC INTERVAL: 435 MS
RBC # UR STRIP: NORMAL /HPF
REF LAB TEST METHOD: NORMAL
SAO2 % BLDCOA: 95.9 % (ref 95–99)
SODIUM BLD-SCNC: 126 MMOL/L (ref 131–143)
SP GR UR STRIP: 1.02 (ref 1–1.03)
SQUAMOUS #/AREA URNS HPF: NORMAL /HPF
UROBILINOGEN UR QL STRIP: ABNORMAL
WBC # UR STRIP: NORMAL /HPF

## 2025-04-02 PROCEDURE — 82803 BLOOD GASES ANY COMBINATION: CPT

## 2025-04-02 PROCEDURE — 36600 WITHDRAWAL OF ARTERIAL BLOOD: CPT

## 2025-04-02 PROCEDURE — 81001 URINALYSIS AUTO W/SCOPE: CPT | Performed by: EMERGENCY MEDICINE

## 2025-04-02 PROCEDURE — 83605 ASSAY OF LACTIC ACID: CPT

## 2025-04-02 PROCEDURE — 82330 ASSAY OF CALCIUM: CPT

## 2025-04-02 PROCEDURE — 82948 REAGENT STRIP/BLOOD GLUCOSE: CPT

## 2025-04-02 PROCEDURE — 80051 ELECTROLYTE PANEL: CPT

## 2025-04-02 RX ORDER — CEFDINIR 300 MG/1
300 CAPSULE ORAL 2 TIMES DAILY
Qty: 14 CAPSULE | Refills: 0 | Status: SHIPPED | OUTPATIENT
Start: 2025-04-02 | End: 2025-04-09

## 2025-04-02 RX ORDER — PREDNISONE 50 MG/1
50 TABLET ORAL DAILY
Qty: 5 TABLET | Refills: 0 | Status: SHIPPED | OUTPATIENT
Start: 2025-04-02 | End: 2025-04-07

## 2025-04-02 NOTE — ED PROVIDER NOTES
Time: 11:16 PM EDT  Date of encounter:  4/1/2025  Independent Historian/Clinical History and Information was obtained by:   Patient  Chief Complaint: Shortness of breath and cough    History is limited by: N/A    History of Present Illness:  Patient is a 74 y.o. year old male who presents to the emergency department for evaluation of shortness of breath and cough.  Patient notes that he has had progressive shortness of breath and cough for 2 months.  He does feel like it is getting worse.  Patient does note that he has chronic respiratory failure and wears oxygen as needed.  He has COPD.  The patient notes he quit smoking about a month ago.  Patient states that he has a worsening cough with yellow sputum.  He has no fever rigors or myalgias.  He does note overall generalized weakness.  He has no headache.  He has no chest pain or chest pressure he has no abdominal pain.  He has no vomiting and diarrhea.        HPI    Patient Care Team  Primary Care Provider: Nancy Yeh DO    Past Medical History:     Allergies   Allergen Reactions    Azithromycin Unknown - High Severity     zpak    Quinolones Other (See Comments)     History of abdominal aortic aneurysm repair    Morphine Unknown - Low Severity    Penicillin G Unknown - Low Severity    Penicillins Unknown - Low Severity    Sulfa Antibiotics Unknown - Low Severity     Past Medical History:   Diagnosis Date    Anxiety     Arthritis     Bladder cancer     Bladder cancer 2010    lining of bladder     Broken bones      as a child     CAD (coronary artery disease) 02/2014    Calculus of kidney     Cataracts, both eyes     CHF (congestive heart failure)     Constipation     Deafness     Depression     Essential hypertension 6/24/2021    Forgetfulness     Gallstones     Head injury     Heart disease     Hemorrhoids     Hernia of pelvic floor     High cholesterol     High cholesterol     History of bladder cancer 6/24/2021    History of heart attack     Hypertension      Kidney stones     Mixed hyperlipidemia 6/24/2021    Primary insomnia 6/24/2021    Shortness of breath     Sinus trouble     Skin disease     UNSURE WHAT KIND/ARMS    Tobacco abuse 6/24/2021     Past Surgical History:   Procedure Laterality Date    ABDOMINAL AORTIC ANEURYSM REPAIR      CAROTID ENDARTERECTOMY Right 02/2014    Dr. Boudreaux    CHOLECYSTECTOMY      CHOLECYSTECTOMY OPEN      CHOLECYSTECTOMY OPEN      CORONARY ARTERY BYPASS GRAFT      CYSTOSCOPY      Cystoscopy, Bladder BX 2004; Cysto, Urethral Dilattion, Bilateral Retrogrades, Biopsy & Fulguration, Left Ureteroscopy 01/17/2013    CYSTOSCOPY RETROGRADE PYELOGRAM Bilateral     EYE SURGERY      KNEE SURGERY Left     Repair    TOTAL HIP ARTHROPLASTY Left      Family History   Problem Relation Age of Onset    Heart disease Other        Home Medications:  Prior to Admission medications    Medication Sig Start Date End Date Taking? Authorizing Provider   ALPRAZolam (XANAX) 0.25 MG tablet Take 1 tablet by mouth At Night As Needed for Anxiety.  Patient taking differently: Take 1 tablet by mouth Daily. 10/10/24  Yes Nancy Yeh DO   metoprolol succinate XL (TOPROL-XL) 25 MG 24 hr tablet Take 1 tablet by mouth Daily. 2/28/25  Yes Nancy Yeh DO   doxycycline (VIBRAMYCIN) 100 MG capsule Take 1 capsule by mouth 2 (Two) Times a Day. 2/18/25   Nancy Yeh DO   guaiFENesin (Mucinex) 600 MG 12 hr tablet Take 2 tablets by mouth 2 (Two) Times a Day. 2/4/25   Nancy Yeh DO   ipratropium-albuterol (DUO-NEB) 0.5-2.5 mg/3 ml nebulizer Take 3 mL by nebulization Every 4 (Four) Hours As Needed for Wheezing. 2/4/25   Nancy Yeh DO   nitroglycerin (NITROSTAT) 0.4 MG SL tablet Place 1 tablet under the tongue Every 5 (Five) Minutes As Needed for Chest Pain. Take no more than 3 doses in 15 minutes. 2/4/25   Nancy Yeh DO        Social History:   Social History     Tobacco Use    Smoking status: Former     Current packs/day: 0.00     Average packs/day: 1 pack/day for  "50.0 years (50.0 ttl pk-yrs)     Types: Cigarettes     Start date: 1/1/1972     Quit date: 10/1/2021     Years since quitting: 3.5     Passive exposure: Past    Smokeless tobacco: Never    Tobacco comments:     smokes a average of 1ppw, no second hand smoke exposure now .   Vaping Use    Vaping status: Never Used   Substance Use Topics    Alcohol use: Never    Drug use: Never         Review of Systems:  Review of Systems   Constitutional:  Positive for fatigue. Negative for chills, diaphoresis and fever.   HENT:  Negative for congestion, postnasal drip, rhinorrhea and sore throat.    Eyes:  Negative for photophobia.   Respiratory:  Positive for cough, shortness of breath and wheezing. Negative for chest tightness.    Cardiovascular:  Negative for chest pain, palpitations and leg swelling.   Gastrointestinal:  Negative for abdominal pain, diarrhea, nausea and vomiting.   Genitourinary:  Negative for difficulty urinating, dysuria, flank pain, frequency, hematuria and urgency.   Musculoskeletal:  Negative for neck pain and neck stiffness.   Skin:  Negative for pallor and rash.   Neurological:  Positive for weakness. Negative for dizziness, syncope, numbness and headaches.   Hematological:  Negative for adenopathy. Does not bruise/bleed easily.   Psychiatric/Behavioral: Negative.          Physical Exam:  /67   Pulse 93   Temp 98.4 °F (36.9 °C) (Oral)   Resp 16   Ht 180.3 cm (71\")   Wt 58.2 kg (128 lb 4.9 oz)   SpO2 95%   BMI 17.90 kg/m²     Physical Exam  Vitals and nursing note reviewed.   Constitutional:       General: He is not in acute distress.     Appearance: Normal appearance. He is not ill-appearing, toxic-appearing or diaphoretic.   HENT:      Head: Normocephalic and atraumatic.      Mouth/Throat:      Mouth: Mucous membranes are dry.   Eyes:      Pupils: Pupils are equal, round, and reactive to light.   Cardiovascular:      Rate and Rhythm: Normal rate and regular rhythm.      Pulses: Normal " pulses.           Carotid pulses are 2+ on the right side and 2+ on the left side.       Radial pulses are 2+ on the right side and 2+ on the left side.        Femoral pulses are 2+ on the right side and 2+ on the left side.       Popliteal pulses are 2+ on the right side and 2+ on the left side.        Dorsalis pedis pulses are 2+ on the right side and 2+ on the left side.        Posterior tibial pulses are 2+ on the right side and 2+ on the left side.      Heart sounds: Normal heart sounds. No murmur heard.  Pulmonary:      Effort: Pulmonary effort is normal. No accessory muscle usage, respiratory distress or retractions.      Breath sounds: Examination of the right-upper field reveals decreased breath sounds and wheezing. Examination of the left-upper field reveals decreased breath sounds and wheezing. Examination of the right-middle field reveals decreased breath sounds and wheezing. Examination of the left-middle field reveals decreased breath sounds and wheezing. Examination of the right-lower field reveals decreased breath sounds. Examination of the left-lower field reveals decreased breath sounds. Decreased breath sounds and wheezing present. No rhonchi or rales.   Abdominal:      General: Abdomen is flat. There is no distension.      Palpations: Abdomen is soft. There is no mass or pulsatile mass.      Tenderness: There is no abdominal tenderness. There is no right CVA tenderness, left CVA tenderness, guarding or rebound.      Comments: No rigidity   Musculoskeletal:         General: No swelling, tenderness or deformity.      Cervical back: Neck supple. No tenderness.      Right lower leg: No edema.      Left lower leg: No edema.   Skin:     General: Skin is warm and dry.      Capillary Refill: Capillary refill takes 2 to 3 seconds.      Coloration: Skin is not jaundiced or pale.      Findings: No erythema.   Neurological:      General: No focal deficit present.      Mental Status: He is alert and oriented  to person, place, and time. Mental status is at baseline.      Cranial Nerves: Cranial nerves 2-12 are intact. No cranial nerve deficit.      Sensory: Sensation is intact. No sensory deficit.      Motor: Motor function is intact. No weakness or pronator drift.      Coordination: Coordination is intact. Coordination normal.   Psychiatric:         Mood and Affect: Mood normal.         Behavior: Behavior normal.                  Procedures:  Procedures      Medical Decision Making:      Comorbidities that affect care:    Coronary disease, hyperlipidemia, congestive heart failure, hypertension, COPD, chronic respiratory failure    External Notes reviewed:    None      The following orders were placed and all results were independently analyzed by me:  Orders Placed This Encounter   Procedures    COVID-19, FLU A/B, RSV PCR 1 HR TAT - Swab, Nasopharynx    XR Chest 1 View    [place] Draw    Comprehensive Metabolic Panel    High Sensitivity Troponin T    Magnesium    Urinalysis With Microscopic If Indicated (No Culture) - Urine, Clean Catch    CBC Auto Differential    High Sensitivity Troponin T 1Hr    Urinalysis, Microscopic Only - Urine, Clean Catch    Arterial Blood Gas + Electrolytes    Blood Gas, Arterial -    NPO Diet NPO Type: Strict NPO    Undress & Gown    Continuous Pulse Oximetry    Vital Signs    Orthostatic Blood Pressure    Oxygen Therapy- Nasal Cannula; Titrate 1-6 LPM Per SpO2; 90 - 95%    POC Glucose Once    POC Glucose Once    POC Lactate    POC Electrolyte Panel    ECG 12 Lead Altered Mental Status    Insert Peripheral IV    Fall Precautions    CBC & Differential    Green Top (Gel)    Lavender Top    Gold Top - SST    Light Blue Top       Medications Given in the Emergency Department:  Medications   sodium chloride 0.9 % flush 10 mL (has no administration in time range)   sodium chloride 0.9 % bolus 1,000 mL (0 mL Intravenous Stopped 4/2/25 0018)        ED Course:    ED Course as of 04/02/25 0203   Tue  Apr 01, 2025 2319 EKG:    Rhythm: Sinus rhythm with baseline artifact  Rate: 96  Intervals: Normal LA and QT interval  T-wave: Isolated nonspecific T wave inversion in V2  ST Segment: Some baseline artifact.  I see no obvious ST elevation and reciprocal ST depression to suggest STEMI    EKG Comparison: No change in the QRS and ST morphology from the EKG performed January 27, 2025    Interpreted by me   [SD]      ED Course User Index  [SD] Wilman Rothman DO       Labs:    Lab Results (last 24 hours)       Procedure Component Value Units Date/Time    COVID-19, FLU A/B, RSV PCR 1 HR TAT - Swab, Nasopharynx [[id number]]  (Normal) Collected: 04/01/25 2028    Specimen: Swab from Nasopharynx Updated: 04/01/25 2118     COVID19 Not Detected     Influenza A PCR Not Detected     Influenza B PCR Not Detected     RSV, PCR Not Detected    Narrative:      Fact sheet for providers: https://www.fda.gov/media/726874/download    Fact sheet for patients: https://www.fda.gov/media/635990/download    Test performed by PCR.    CBC & Differential [[id number]]  (Abnormal) Collected: 04/01/25 2032    Specimen: Blood from Arm, Right Updated: 04/01/25 2057    Narrative:      The following orders were created for panel order CBC & Differential.  Procedure                               Abnormality         Status                     ---------                               -----------         ------                     CBC Auto Differential[[id number]]        Abnormal            Final result               Scan Slide[[id number]]                                                                    Please view results for these tests on the individual orders.    Comprehensive Metabolic Panel [[id number]]  (Abnormal) Collected: 04/01/25 2032    Specimen: Blood from Arm, Right Updated: 04/01/25 2122     Glucose 102 mg/dL      BUN 18 mg/dL      Creatinine 1.53 mg/dL      Sodium 121 mmol/L      Potassium 4.3 mmol/L      Chloride 85 mmol/L      CO2 24.6 mmol/L       Calcium 8.9 mg/dL      Total Protein 6.7 g/dL      Albumin 3.7 g/dL      ALT (SGPT) <5 U/L      AST (SGOT) 16 U/L      Alkaline Phosphatase 62 U/L      Total Bilirubin 1.6 mg/dL      Globulin 3.0 gm/dL      A/G Ratio 1.2 g/dL      BUN/Creatinine Ratio 11.8     Anion Gap 11.4 mmol/L      eGFR 47.7 mL/min/1.73     Narrative:      GFR Categories in Chronic Kidney Disease (CKD)      GFR Category          GFR (mL/min/1.73)    Interpretation  G1                     90 or greater         Normal or high (1)  G2                      60-89                Mild decrease (1)  G3a                   45-59                Mild to moderate decrease  G3b                   30-44                Moderate to severe decrease  G4                    15-29                Severe decrease  G5                    14 or less           Kidney failure          (1)In the absence of evidence of kidney disease, neither GFR category G1 or G2 fulfill the criteria for CKD.    eGFR calculation 2021 CKD-EPI creatinine equation, which does not include race as a factor    High Sensitivity Troponin T [[id number]]  (Abnormal) Collected: 04/01/25 2032    Specimen: Blood from Arm, Right Updated: 04/01/25 2106     HS Troponin T 25 ng/L     Narrative:      High Sensitive Troponin T Reference Range:  <14.0 ng/L- Negative Female for AMI  <22.0 ng/L- Negative Male for AMI  >=14 - Abnormal Female indicating possible myocardial injury.  >=22 - Abnormal Male indicating possible myocardial injury.   Clinicians would have to utilize clinical acumen, EKG, Troponin, and serial changes to determine if it is an Acute Myocardial Infarction or myocardial injury due to an underlying chronic condition.         Magnesium [[id number]]  (Normal) Collected: 04/01/25 2032    Specimen: Blood from Arm, Right Updated: 04/01/25 2117     Magnesium 1.7 mg/dL     CBC Auto Differential [[id number]]  (Abnormal) Collected: 04/01/25 2032    Specimen: Blood from Arm, Right Updated: 04/01/25 2057      WBC 7.41 10*3/mm3      RBC 3.94 10*6/mm3      Hemoglobin 12.5 g/dL      Hematocrit 35.6 %      MCV 90.4 fL      MCH 31.7 pg      MCHC 35.1 g/dL      RDW 14.7 %      RDW-SD 49.1 fl      MPV 8.7 fL      Platelets 133 10*3/mm3      Neutrophil % 63.4 %      Lymphocyte % 25.9 %      Monocyte % 9.3 %      Eosinophil % 0.4 %      Basophil % 0.3 %      Immature Grans % 0.7 %      Neutrophils, Absolute 4.70 10*3/mm3      Lymphocytes, Absolute 1.92 10*3/mm3      Monocytes, Absolute 0.69 10*3/mm3      Eosinophils, Absolute 0.03 10*3/mm3      Basophils, Absolute 0.02 10*3/mm3      Immature Grans, Absolute 0.05 10*3/mm3      nRBC 0.0 /100 WBC     High Sensitivity Troponin T 1Hr [[id number]]  (Abnormal) Collected: 04/01/25 2152    Specimen: Blood from Arm, Right Updated: 04/01/25 2215     HS Troponin T 24 ng/L      Troponin T Numeric Delta -1 ng/L      Troponin T % Delta -4    Narrative:      High Sensitive Troponin T Reference Range:  <14.0 ng/L- Negative Female for AMI  <22.0 ng/L- Negative Male for AMI  >=14 - Abnormal Female indicating possible myocardial injury.  >=22 - Abnormal Male indicating possible myocardial injury.   Clinicians would have to utilize clinical acumen, EKG, Troponin, and serial changes to determine if it is an Acute Myocardial Infarction or myocardial injury due to an underlying chronic condition.         Urinalysis With Microscopic If Indicated (No Culture) - Urine, Clean Catch [[id number]]  (Abnormal) Collected: 04/02/25 0010    Specimen: Urine, Clean Catch Updated: 04/02/25 0021     Color, UA Dark Yellow     Appearance, UA Clear     pH, UA 6.0     Specific Gravity, UA 1.018     Glucose, UA Negative     Ketones, UA Trace     Bilirubin, UA Negative     Blood, UA Negative     Protein, UA 30 mg/dL (1+)     Leuk Esterase, UA Negative     Nitrite, UA Negative     Urobilinogen, UA 1.0 E.U./dL    Urinalysis, Microscopic Only - Urine, Clean Catch [[id number]] Collected: 04/02/25 0010    Specimen: Urine, Clean  Catch Updated: 04/02/25 0021     RBC, UA 0-2 /HPF      WBC, UA 0-2 /HPF      Bacteria, UA None Seen /HPF      Squamous Epithelial Cells, UA 0-2 /HPF      Hyaline Casts, UA 0-2 /LPF      Methodology Automated Microscopy    POC Glucose Once [[id number]]  (Normal) Collected: 04/02/25 0045    Specimen: Arterial Blood Updated: 04/02/25 0047     Glucose 91 mg/dL      Comment: Serial Number: [id number]:  [id number]       Blood Gas, Arterial - [[id number]]  (Abnormal) Collected: 04/02/25 0045    Specimen: Arterial Blood Updated: 04/02/25 0047     Site Left Radial     Rubén's Test Positive     pH, Arterial 7.383 pH units      pCO2, Arterial 35.3 mm Hg      pO2, Arterial 81.9 mm Hg      HCO3, Arterial 21.1 mmol/L      Base Excess, Arterial -3.5 mmol/L      Comment: Serial Number: [id number]:  [id number]        O2 Saturation, Arterial 95.9 %      Hemoglobin, Blood Gas 11.3 g/dL      Hematocrit, Blood Gas 33.0 %      Barometric Pressure for Blood Gas 743.1000 mmHg      Modality Cannula     FIO2 28 %      Flow Rate 2.0000 lpm      Hemodilution No     PO2/FIO2 293    POC Lactate [[id number]]  (Normal) Collected: 04/02/25 0045    Specimen: Arterial Blood Updated: 04/02/25 0047     Lactate 0.8 mmol/L      Comment: Serial Number: [id number]:  [id number]       POC Electrolyte Panel [[id number]]  (Abnormal) Collected: 04/02/25 0045    Specimen: Arterial Blood Updated: 04/02/25 0047     Sodium 126 mmol/L      POC Potassium 4.0 mmol/L      Chloride 93 mmol/L      Ionized Calcium 1.05 mmol/L      Comment: Serial Number: [id number]:  [id number]                Imaging:    XR Chest 1 View  Result Date: 4/1/2025  XR CHEST 1 VW Date of Exam: 4/1/2025 9:15 PM EDT Indication: Weak/Dizzy/AMS triage protocol Comparison: 1/27/2025 Findings: Cardiac and mediastinal contours are normal. Patient is status post sternotomy. There is elevation of the left hemidiaphragm with chronic scarring in both lung bases. There is pulmonary emphysema. There is a left  perihilar mass measuring about 3.1 cm that is unchanged and previously worked up as a benign hamartoma with a negative PET/CT 11/23/2022. No acute infiltrates or pneumothorax.     1.Pulmonary emphysema with chronic scarring in both lung bases. 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 Electronically Signed: Serafin Martins MD  4/1/2025 9:44 PM EDT  Workstation ID: [id number]        Differential Diagnosis and Discussion:    Dyspnea: Differential diagnosis includes but is not limited to metabolic acidosis, neurological disorders, psychogenic, asthma, pneumothorax, upper airway obstruction, COPD, pneumonia, noncardiogenic pulmonary edema, interstitial lung disease, anemia, congestive heart failure, and pulmonary embolism    Labs were collected in the emergency department and all labs were reviewed and interpreted by me.  X-ray were performed in the emergency department and all X-ray impressions were independently interpreted by me.  An EKG was performed and the EKG was interpreted by me.    MDM  Number of Diagnoses or Management Options  Acute exacerbation of chronic obstructive pulmonary disease (COPD)  Chronic respiratory failure with hypoxia  Dehydration  Hyponatremia  Weakness  Diagnosis management comments: Chest x-ray demonstrates pulmonary exam with chronic scarring both lungs.  There is a stable left perihilar mass previously worked up as a benign Hamartoma    The patient's CBC was reviewed and shows no abnormalities of critical concern.  Of note, there is no anemia requiring a blood transfusion and the platelet count is acceptable    The patient's EKG demonstrated a normal sinus rhythm.  The EKG demonstrated no evidence of dysrhythmia.  The patient had no acute ST abnormalities or acute T wave abnormalities to indicate STEMI     The patient's Covid swab was negative   The patient's Influenza swab was negative   The patient's RSV swab was negative    The patient's CMP was reviewed  and shows no abnormalities of critical concern.  Of note, the patient's potassium was acceptable.  The patient's liver enzymes are unremarkable.  The patient's renal function including creatinine is preserved.  The patient has a normal anion gap.  The patient's sodium was low at 121    Patient's sodium was treated with a liter of normal saline.    The patient's magnesium level is unremarkable and thus I do not feel is contributory to the patient's symptoms    Patient's first troponin was slightly elevated at 25.  It was repeated an hour later and returned at 24.  This is normal and a negative delta of 1.    The patient's urinalysis was negative for obvious infection or blood    ABG was performed after the fluids.  The patient's pH was 7.38, CO2 of 35 and a pO2 of 81 on 2 L nasal cannula.    Repeat sodium on that blood gas was 126.  This demonstrates improvement with low sodium.    The patient's lactate is normal.  This typically indicates that the patient has normal tissue perfusion as well as severe sepsis is unlikely    The patient set up 1 time to urinate and became very weak and is pressure dropped into the 80s systolic.  He was placed back in bed.  His blood pressure improved.  At this point I felt the patient should be admitted to the hospital due to acute on chronic respiratory failure, COPD exacerbation hyponatremia dehydration.  Strongly the patient is a stay in the hospital.  The patient is refusing admission.  The son is at bedside who tried to convince the patient to stay as well.  The patient is refusing.  The patient is alert and orient x 3 cooperative and pleasant.  He has a capacity to make his own medical decision making.  The patient understands that he could go home and have worsening respiratory failure, fall, injury to the brain neck and extremities.  He understands this could result in permanent neurological debility or even death.  Patient continues to refuse to stay in the hospital.  The  patient will be discharged at his request.  Strongly encouraged the patient to return for any reason and if he changes his mind and would like to be admitted to the hospital.  I will send the patient home on prednisone and Omnicef.  I have asked the patient to follow-up with his doctor today.  I have asked the patient to discuss the need to recheck his sodium.  I have asked the patient to push oral fluids.    The patient was given very specific instructions on when and why to return to the emergency room.  The patient voiced understanding and felt comfortable with the discharge instructions.  They would return to the emergency room if necessary.  The patient appears appropriate for discharge and outpatient follow-up.         Amount and/or Complexity of Data Reviewed  Clinical lab tests: reviewed  Tests in the radiology section of CPT®: reviewed  Tests in the medicine section of CPT®: reviewed  Decide to obtain previous medical records or to obtain history from someone other than the patient: yes               Social Determinants of Health:    Patient is independent, reliable, and has access to care.       Disposition and Care Coordination:    I advised the patient that in my opinion through evaluation of the history, physical, and objective data admission to the hospital is warranted. However, the patient/caretaker has declined admission understanding the risks of discharge.    I have explained discharge medications and the need for follow up with the patient/caretakers. This was also printed in the discharge instructions. Patient was discharged with the following medications and follow up:      Medication List        New Prescriptions      cefdinir 300 MG capsule  Commonly known as: OMNICEF  Take 1 capsule by mouth 2 (Two) Times a Day for 7 days.     predniSONE 50 MG tablet  Commonly known as: DELTASONE  Take 1 tablet by mouth Daily for 5 days.            Changed      ALPRAZolam 0.25 MG tablet  Commonly known as:  XANAX  Take 1 tablet by mouth At Night As Needed for Anxiety.  What changed: when to take this               Where to Get Your Medications        These medications were sent to [facility] - [place], [place] - [address] - [phone]  - [phone]  [address], [facility] [address]      Phone: [phone]   cefdinir 300 MG capsule  predniSONE 50 MG tablet      Nancy Yeh DO  [address]  [place] [id number] [phone]    Schedule an appointment as soon as possible for a visit on 4/2/2025         Final diagnoses:   Acute exacerbation of chronic obstructive pulmonary disease (COPD)   Hyponatremia   Chronic respiratory failure with hypoxia   Weakness   Dehydration        ED Disposition       ED Disposition   Discharge    Condition   Stable    Comment   --               This medical record created using voice recognition software.             Wilman Rothman DO  04/02/25 0203

## 2025-04-02 NOTE — DISCHARGE INSTRUCTIONS
Please return to the emergency room immediately if you change your mind and would like to be admitted to the hospital for your symptoms    Please wear your oxygen 2 L by nasal cannula at all times    Please use your albuterol nebulizer every 4-6 hours as needed for shortness of breath and wheezing    Please push oral fluids    Your sodium was low today.  Please discuss need to recheck your sodium level with your primary care physician at your follow-up appointment    Please return to the emergency immediately for progressive weakness, increasing shortness of breath, altered mental status, near passing out, passing out, chest pain, chest pressure or any reason that you may be concerned about

## 2025-04-04 ENCOUNTER — TELEPHONE (OUTPATIENT)
Dept: CASE MANAGEMENT | Facility: OTHER | Age: 75
End: 2025-04-04
Payer: MEDICARE

## 2025-04-04 NOTE — TELEPHONE ENCOUNTER
Attempted to reach for CCM services and unable to reach, left a voicemail for call back to [place] office today, if not will reach back out tomorrow. First outreach.

## 2025-04-07 ENCOUNTER — TELEPHONE (OUTPATIENT)
Dept: CASE MANAGEMENT | Facility: OTHER | Age: 75
End: 2025-04-07
Payer: MEDICARE

## 2025-04-09 ENCOUNTER — PATIENT OUTREACH (OUTPATIENT)
Dept: CASE MANAGEMENT | Facility: OTHER | Age: 75
End: 2025-04-09
Payer: MEDICARE

## 2025-04-09 DIAGNOSIS — Z85.51 HISTORY OF BLADDER CANCER: Primary | Chronic | ICD-10-CM

## 2025-04-09 DIAGNOSIS — E78.2 MIXED HYPERLIPIDEMIA: Chronic | ICD-10-CM

## 2025-04-09 DIAGNOSIS — I10 ESSENTIAL HYPERTENSION: Chronic | ICD-10-CM

## 2025-04-09 NOTE — OUTREACH NOTE
AMBULATORY CASE MANAGEMENT NOTE    Names and Relationships of Patient/Support Persons: Contact: Cm Flores; Relationship: Self -     Patient Outreach    Called for Ccm services due to ER visit. He has follow up with PCP 4/15/25 and denies any needs at this time. I explained CCM and case management and he is not interested or have any needs for HRCM.     Education Documentation  No documentation found.        Jocelyn HAYWARD  Ambulatory Case Management    4/9/2025, 16:52 EDT

## 2025-04-15 ENCOUNTER — OFFICE VISIT (OUTPATIENT)
Dept: FAMILY MEDICINE CLINIC | Facility: CLINIC | Age: 75
End: 2025-04-15
Payer: MEDICARE

## 2025-04-15 VITALS
DIASTOLIC BLOOD PRESSURE: 53 MMHG | BODY MASS INDEX: 17.98 KG/M2 | HEART RATE: 70 BPM | WEIGHT: 128.4 LBS | RESPIRATION RATE: 18 BRPM | SYSTOLIC BLOOD PRESSURE: 90 MMHG | OXYGEN SATURATION: 98 % | HEIGHT: 71 IN | TEMPERATURE: 97.9 F

## 2025-04-15 DIAGNOSIS — F41.1 GAD (GENERALIZED ANXIETY DISORDER): Chronic | ICD-10-CM

## 2025-04-15 DIAGNOSIS — N18.31 STAGE 3A CHRONIC KIDNEY DISEASE: Chronic | ICD-10-CM

## 2025-04-15 DIAGNOSIS — Z87.891 SMOKING HISTORY: ICD-10-CM

## 2025-04-15 DIAGNOSIS — I10 ESSENTIAL HYPERTENSION: Chronic | ICD-10-CM

## 2025-04-15 DIAGNOSIS — J44.1 COPD WITH EXACERBATION: Primary | ICD-10-CM

## 2025-04-15 PROBLEM — J96.11 CHRONIC HYPOXIC RESPIRATORY FAILURE: Chronic | Status: ACTIVE | Noted: 2025-01-27

## 2025-04-15 RX ORDER — HYDROCODONE POLISTIREX AND CHLORPHENIRAMINE POLISTIREX 10; 8 MG/5ML; MG/5ML
5 SUSPENSION, EXTENDED RELEASE ORAL EVERY 12 HOURS PRN
Qty: 100 ML | Refills: 0 | Status: SHIPPED | OUTPATIENT
Start: 2025-04-15

## 2025-04-15 RX ORDER — ALPRAZOLAM 0.25 MG
0.25 TABLET ORAL NIGHTLY PRN
Qty: 30 TABLET | Refills: 5 | Status: SHIPPED | OUTPATIENT
Start: 2025-04-15

## 2025-04-15 RX ORDER — DOXYCYCLINE 100 MG/1
100 CAPSULE ORAL 2 TIMES DAILY
Qty: 20 CAPSULE | Refills: 0 | Status: SHIPPED | OUTPATIENT
Start: 2025-04-15 | End: 2025-04-29

## 2025-04-15 RX ORDER — GUAIFENESIN 600 MG/1
1200 TABLET, EXTENDED RELEASE ORAL 2 TIMES DAILY
Qty: 56 TABLET | Refills: 0 | Status: SHIPPED | OUTPATIENT
Start: 2025-04-15 | End: 2025-04-29

## 2025-04-15 RX ORDER — PREDNISONE 10 MG/1
TABLET ORAL
Qty: 30 TABLET | Refills: 0 | Status: SHIPPED | OUTPATIENT
Start: 2025-04-15 | End: 2025-04-30

## 2025-04-15 NOTE — ASSESSMENT & PLAN NOTE
COPD is worsening.    Plan:  Begin taking the following medication/s; Briztre.    Discussed medication dosage, use, side effects, and goals of treatment in detail.    Warning signs of respiratory distress were reviewed with the patient.   Discussed distinction between quick-relief and maintenance control medications..    Patient Treatment Goals:   symptom prevention, minimizing limitation in activity, prevention of exacerbations and use of ER/inpatient care, maintenance of optimal pulmonary function, and minimization of adverse effects of treatment    Followup in 4 weeks.

## 2025-04-15 NOTE — PROGRESS NOTES
Chief Complaint  Shortness of Breath (Pt was seen at [facility] ED on 4/1 for issues with SOB and cough. Pt is inquiring about potential pulm referral d/t recurrent pna in his R lung and continuing SOB. )    Subjective        Cm Flores presents to [facility]  History of Present Illness  He is here today for follow-up for the management of his acute and chronic medical condition.  He does not have any children.  He has a history of kidney stones.  He has coronary artery disease, peripheral vascular disease, tobacco abuse, history of AAA repair in 2016 and bladder cancer in 2008.     He has a 50 pack year smoking habit and he stopped smoking last year.  He was offered a low-dose CT scan today but refused.     He has been managed by hematology for thrombocytopenia.     He has gained 2 lbs since his last visit.    He is here today with symptoms that include mucus production and difficulty breathing, consistent with a COPD flare-up. His sodium levels were slightly low during a recent ER visit, but potassium levels were normal.     The patient has no complaints today and denies weakness, numbness, nausea, vomiting, diarrhea, dizziness or syncopal event          History of Present Illness  The patient presents for evaluation of COPD.    A sudden onset of symptoms necessitated a visit to the emergency room on 04/01/2025, where oxygen saturation was found to be in the low 70s. This led to the initiation of home oxygen therapy at 2 liters per minute. He has been unable to drive due to this condition and has been on home oxygen since 04/01/2025. Adequate hydration is maintained, but assistance is required for meal preparation. A history of right lung pneumonia is noted. Since starting oxygen therapy, increased mucus production is reported, particularly in the mornings, which is more pronounced when sleeping in a supine position. Prednisone was previously prescribed and reported as  "beneficial.      Objective   Vital Signs:  BP 90/53 (BP Location: Left arm, Patient Position: Sitting, Cuff Size: Adult)   Pulse 70   Temp 97.9 °F (36.6 °C) (Temporal)   Resp 18 Comment: on 2L NC  Ht 180.3 cm (70.98\")   Wt 58.2 kg (128 lb 6.4 oz)   SpO2 98%   BMI 17.92 kg/m²   Estimated body mass index is 17.92 kg/m² as calculated from the following:    Height as of this encounter: 180.3 cm (70.98\").    Weight as of this encounter: 58.2 kg (128 lb 6.4 oz).            Physical Exam  Vitals reviewed.   Constitutional:       Appearance: He is well-developed and underweight.   HENT:      Head: Normocephalic and atraumatic.      Right Ear: External ear normal.      Left Ear: External ear normal.      Mouth/Throat:      Pharynx: No oropharyngeal exudate.   Eyes:      Conjunctiva/sclera: Conjunctivae normal.      Pupils: Pupils are equal, round, and reactive to light.   Neck:      Vascular: No carotid bruit.   Cardiovascular:      Rate and Rhythm: Normal rate and regular rhythm.      Heart sounds: No murmur heard.     No friction rub. No gallop.   Pulmonary:      Effort: Pulmonary effort is normal.      Breath sounds: Decreased air movement present. Decreased breath sounds and rhonchi present. No wheezing.   Abdominal:      General: There is no distension.   Skin:     General: Skin is warm and dry.   Neurological:      Mental Status: He is alert and oriented to person, place, and time.      Cranial Nerves: No cranial nerve deficit.      Motor: No weakness.   Psychiatric:         Mood and Affect: Mood and affect normal.         Behavior: Behavior normal.         Thought Content: Thought content normal.         Judgment: Judgment normal.        Result Review :    CMP          1/28/2025    05:15 1/29/2025    06:04 4/1/2025    20:32   CMP   Glucose 80  87  102    BUN 23  19  18    Creatinine 1.50  1.36  1.53    EGFR 48.9  54.9  47.7    Sodium 131  130  121    Potassium 4.3  3.9  4.3    Chloride 98  97  85    Calcium 7.5 "  7.8  8.9    Total Protein   6.7    Albumin   3.7    Globulin   3.0    Total Bilirubin   1.6    Alkaline Phosphatase   62    AST (SGOT)   16    ALT (SGPT)   <5    Albumin/Globulin Ratio   1.2    BUN/Creatinine Ratio 15.3  14.0  11.8    Anion Gap 10.6  11.1  11.4      CBC          1/28/2025    05:15 1/29/2025    06:04 4/1/2025    20:32   CBC   WBC 4.48  3.06  7.41    RBC 4.18  3.97  3.94    Hemoglobin 12.6  12.1  12.5    Hematocrit 38.7  37.1  35.6    MCV 92.6  93.5  90.4    MCH 30.1  30.5  31.7    MCHC 32.6  32.6  35.1    RDW 14.7  14.3  14.7    Platelets 52  52  133                    Assessment and Plan   Diagnoses and all orders for this visit:    1. COPD with exacerbation (Primary)  Assessment & Plan:  COPD is worsening.    Plan:    - A regimen of prednisone for 2 weeks will be initiated to reduce inflammation. Breztri inhaler samples will be provided, with instructions to use 2 puffs twice daily. If tolerated well, a prescription will be sent. Azithromycin will be prescribed to leverage its anti-inflammatory properties in the lungs.  - Mucinex will be continued for 2 weeks to help thin mucus and facilitate coughing it out. A referral to a pulmonologist will be made for further evaluation and management.  Begin taking the following medication/s; Briztre.    Discussed medication dosage, use, side effects, and goals of treatment in detail.    Warning signs of respiratory distress were reviewed with the patient.   Discussed distinction between quick-relief and maintenance control medications..    Patient Treatment Goals:   symptom prevention, minimizing limitation in activity, prevention of exacerbations and use of ER/inpatient care, maintenance of optimal pulmonary function, and minimization of adverse effects of treatment    Followup in 4 weeks.    Orders:  -     guaiFENesin (Mucinex) 600 MG 12 hr tablet; Take 2 tablets by mouth 2 (Two) Times a Day for 14 days.  Dispense: 56 tablet; Refill: 0  -     predniSONE  (DELTASONE) 10 MG tablet; Take 3 tablets by mouth Daily for 5 days, THEN 2 tablets Daily for 5 days, THEN 1 tablet Daily for 5 days.  Dispense: 30 tablet; Refill: 0  -     Hydrocod Nehemias-Chlorphe Nehemias ER (TUSSIONEX PENNKINETIC) 10-8 MG/5ML ER suspension; Take 5 mL by mouth Every 12 (Twelve) Hours As Needed for Cough.  Dispense: 100 mL; Refill: 0  -     doxycycline (VIBRAMYCIN) 100 MG capsule; Take 1 capsule by mouth 2 (Two) Times a Day for 14 days.  Dispense: 20 capsule; Refill: 0    2. Essential hypertension  Assessment & Plan:  Hypertension is stable and controlled  Continue current treatment regimen.  Dietary sodium restriction.  Blood pressure will be reassessed in 6 months.    3. ADAMA (generalized anxiety disorder)  Assessment & Plan:  The patient's anxiety is currently well-controlled with Xanax quarter milligrams nightly.  UDS, controlled medication contract and Dawit in the chart for review.  The patient's abuse risk is very low.    Orders:  -     ALPRAZolam (XANAX) 0.25 MG tablet; Take 1 tablet by mouth At Night As Needed for Anxiety.  Dispense: 30 tablet; Refill: 5    4. Smoking history  -      CT Chest Low Dose Cancer Screening WO; Future    5. Stage 3a chronic kidney disease  Assessment & Plan:  Renal condition is worsening.  Continue current treatment regimen.  Renal condition will be reassessed in 6 months.                 Follow Up   Return in about 4 weeks (around 5/13/2025).  Patient was given instructions and counseling regarding his condition or for health maintenance advice. Please see specific information pulled into the AVS if appropriate.         Patient or patient representative verbalized consent for the use of Ambient Listening during the visit with  Nancy Yeh DO for chart documentation. 4/15/2025  17:01 EDT

## 2025-05-05 ENCOUNTER — HOSPITAL ENCOUNTER (OUTPATIENT)
Dept: CT IMAGING | Facility: HOSPITAL | Age: 75
Discharge: HOME | End: 2025-05-05
Admitting: FAMILY MEDICINE
Payer: MEDICARE

## 2025-05-05 DIAGNOSIS — Z87.891 SMOKING HISTORY: ICD-10-CM

## 2025-05-05 PROCEDURE — 71271 CT THORAX LUNG CANCER SCR C-: CPT

## 2025-05-15 ENCOUNTER — OFFICE VISIT (OUTPATIENT)
Dept: FAMILY MEDICINE CLINIC | Facility: CLINIC | Age: 75
End: 2025-05-15
Payer: MEDICARE

## 2025-05-15 VITALS
OXYGEN SATURATION: 100 % | WEIGHT: 129 LBS | DIASTOLIC BLOOD PRESSURE: 55 MMHG | SYSTOLIC BLOOD PRESSURE: 97 MMHG | HEART RATE: 71 BPM | BODY MASS INDEX: 18.06 KG/M2 | RESPIRATION RATE: 18 BRPM | HEIGHT: 71 IN | TEMPERATURE: 98 F

## 2025-05-15 DIAGNOSIS — I50.9 CHRONIC CONGESTIVE HEART FAILURE, UNSPECIFIED HEART FAILURE TYPE: Primary | ICD-10-CM

## 2025-05-15 DIAGNOSIS — F41.1 GAD (GENERALIZED ANXIETY DISORDER): Chronic | ICD-10-CM

## 2025-05-15 DIAGNOSIS — N18.31 STAGE 3A CHRONIC KIDNEY DISEASE: Chronic | ICD-10-CM

## 2025-05-15 DIAGNOSIS — I10 ESSENTIAL HYPERTENSION: Chronic | ICD-10-CM

## 2025-05-15 DIAGNOSIS — J96.11 CHRONIC RESPIRATORY FAILURE WITH HYPOXIA: ICD-10-CM

## 2025-05-15 DIAGNOSIS — J44.9 COPD, SEVERE: ICD-10-CM

## 2025-05-15 PROBLEM — R06.00 DYSPNEA: Status: RESOLVED | Noted: 2024-06-25 | Resolved: 2025-05-15

## 2025-05-15 PROBLEM — Z00.00 MEDICARE ANNUAL WELLNESS VISIT, SUBSEQUENT: Status: RESOLVED | Noted: 2022-07-26 | Resolved: 2025-05-15

## 2025-05-15 PROBLEM — J44.1 COPD WITH EXACERBATION: Status: RESOLVED | Noted: 2022-10-25 | Resolved: 2025-05-15

## 2025-05-15 RX ORDER — BUDESONIDE, GLYCOPYRROLATE, AND FORMOTEROL FUMARATE 160; 9; 4.8 UG/1; UG/1; UG/1
2 AEROSOL, METERED RESPIRATORY (INHALATION) 2 TIMES DAILY
Qty: 10.7 G | Refills: 5 | Status: SHIPPED | OUTPATIENT
Start: 2025-05-15

## 2025-05-15 NOTE — PROGRESS NOTES
"Chief Complaint  Shortness of Breath (Pt reports that he wears O2 daily. Only becomes short of air if he takes O2 off. Imaging recently performed showed advanced centrilobular emphysema.)    Subjective        Cm Flores presents to [facility]  History of Present Illness  He is here today for follow-up for the management of his acute and chronic medical condition.  He does not have any children.  He has a history of kidney stones.  He has coronary artery disease, peripheral vascular disease, tobacco abuse, history of AAA repair in 2016 and bladder cancer in 2008.     He has a 50 pack year smoking habit and he stopped smoking last year.  He was offered a low-dose CT scan today but refused.     He has been managed by hematology for thrombocytopenia.     He has gained 1 lb since his last visit.     He is using Breztri and it is helping his breathing.    His 02 drops into the low 80's when he takes his 02 off.      The patient has no complaints today and denies weakness, numbness, nausea, vomiting, diarrhea, dizziness or syncopal event        Objective   Vital Signs:  BP 97/55 (BP Location: Left arm, Patient Position: Sitting, Cuff Size: Adult)   Pulse 71   Temp 98 °F (36.7 °C) (Temporal)   Resp 18   Ht 180.3 cm (70.98\")   Wt 58.5 kg (129 lb)   SpO2 100% Comment: 2L NC  BMI 18.00 kg/m²   Estimated body mass index is 18 kg/m² as calculated from the following:    Height as of this encounter: 180.3 cm (70.98\").    Weight as of this encounter: 58.5 kg (129 lb).            Physical Exam  Vitals reviewed.   Constitutional:       Appearance: He is well-developed and underweight.   HENT:      Head: Normocephalic and atraumatic.      Right Ear: External ear normal.      Left Ear: External ear normal.      Mouth/Throat:      Pharynx: No oropharyngeal exudate.   Eyes:      Conjunctiva/sclera: Conjunctivae normal.      Pupils: Pupils are equal, round, and reactive to light.   Neck:      " Vascular: No carotid bruit.   Cardiovascular:      Rate and Rhythm: Normal rate and regular rhythm.      Heart sounds: No murmur heard.     No friction rub. No gallop.   Pulmonary:      Effort: Pulmonary effort is normal.      Breath sounds: Decreased air movement present. Decreased breath sounds present. No wheezing or rhonchi.   Abdominal:      General: There is no distension.   Skin:     General: Skin is warm and dry.   Neurological:      Mental Status: He is alert and oriented to person, place, and time.      Cranial Nerves: No cranial nerve deficit.      Motor: No weakness.   Psychiatric:         Mood and Affect: Mood and affect normal.         Behavior: Behavior normal.         Thought Content: Thought content normal.         Judgment: Judgment normal.        Result Review :    CMP          1/28/2025    05:15 1/29/2025    06:04 4/1/2025    20:32   CMP   Glucose 80  87  102    BUN 23  19  18    Creatinine 1.50  1.36  1.53    EGFR 48.9  54.9  47.7    Sodium 131  130  121    Potassium 4.3  3.9  4.3    Chloride 98  97  85    Calcium 7.5  7.8  8.9    Total Protein   6.7    Albumin   3.7    Globulin   3.0    Total Bilirubin   1.6    Alkaline Phosphatase   62    AST (SGOT)   16    ALT (SGPT)   <5    Albumin/Globulin Ratio   1.2    BUN/Creatinine Ratio 15.3  14.0  11.8    Anion Gap 10.6  11.1  11.4      CBC          1/28/2025    05:15 1/29/2025    06:04 4/1/2025    20:32   CBC   WBC 4.48  3.06  7.41    RBC 4.18  3.97  3.94    Hemoglobin 12.6  12.1  12.5    Hematocrit 38.7  37.1  35.6    MCV 92.6  93.5  90.4    MCH 30.1  30.5  31.7    MCHC 32.6  32.6  35.1    RDW 14.7  14.3  14.7    Platelets 52  52  133                    Assessment and Plan   Diagnoses and all orders for this visit:    1. Chronic congestive heart failure, unspecified heart failure type (Primary)  -     Ambulatory Referral to Pulmonology    2. Chronic respiratory failure with hypoxia  -     Ambulatory Referral to Pulmonology    3. COPD,  severe  Assessment & Plan:  COPD is worsening.    Plan:  Begin taking the following medication/s; Breztri.    Discussed medication dosage, use, side effects, and goals of treatment in detail.    Warning signs of respiratory distress were reviewed with the patient. .    Patient Treatment Goals:   symptom prevention, minimizing limitation in activity, prevention of exacerbations and use of ER/inpatient care, maintenance of optimal pulmonary function, and minimization of adverse effects of treatment    Followup in 6 months.    Orders:  -     Ambulatory Referral to Pulmonology  -     Budeson-Glycopyrrol-Formoterol (Breztri Aerosphere) 160-9-4.8 MCG/ACT aerosol inhaler; Inhale 2 puffs 2 (Two) Times a Day.  Dispense: 10.7 g; Refill: 5    4. Essential hypertension  Assessment & Plan:  Hypertension is stable and controlled  Continue current treatment regimen.  Dietary sodium restriction.  Blood pressure will be reassessed in 6 months.          5. Stage 3a chronic kidney disease  Assessment & Plan:  Renal condition is stable.  Continue current treatment regimen.  Renal condition will be reassessed in 6 months.      6. ADAMA (generalized anxiety disorder)  Assessment & Plan:  The patient's anxiety is currently well-controlled with Xanax quarter milligrams nightly.  UDS, controlled medication contract and Dawit in the chart for review.  The patient's abuse risk is very low.                   Follow Up   Return in about 6 months (around 11/15/2025).  Patient was given instructions and counseling regarding his condition or for health maintenance advice. Please see specific information pulled into the AVS if appropriate.         Patient or patient representative verbalized consent for the use of Ambient Listening during the visit with  Nancy Yeh DO for chart documentation. 5/15/2025  11:32 EDT

## 2025-05-30 RX ORDER — METOPROLOL SUCCINATE 25 MG/1
25 TABLET, EXTENDED RELEASE ORAL DAILY
Qty: 90 TABLET | Refills: 0 | Status: SHIPPED | OUTPATIENT
Start: 2025-05-30

## 2025-05-30 NOTE — TELEPHONE ENCOUNTER
Caller: MarkCm    Relationship: Self    Best call back number: [phone]     Requested Prescriptions:   Requested Prescriptions     Pending Prescriptions Disp Refills    metoprolol succinate XL (TOPROL-XL) 25 MG 24 hr tablet 90 tablet 0     Sig: Take 1 tablet by mouth Daily.        Pharmacy where request should be sent: [address] [phone]  - [phone]     Last office visit with prescribing clinician: 5/15/2025   Last telemedicine visit with prescribing clinician: Visit date not found   Next office visit with prescribing clinician: 7/24/2025     Additional details provided by patient: PATIENT STATES HE IS COMPLETELY OUT OF THIS MEDICATION     Does the patient have less than a 3 day supply:  [x] Yes  [] No      Francisco Guerra Rep   05/30/25 09:11 EDT

## 2025-06-27 ENCOUNTER — OFFICE VISIT (OUTPATIENT)
Dept: PULMONOLOGY | Facility: CLINIC | Age: 75
End: 2025-06-27
Payer: MEDICARE

## 2025-06-27 VITALS
BODY MASS INDEX: 18.06 KG/M2 | HEIGHT: 71 IN | HEART RATE: 79 BPM | TEMPERATURE: 97.8 F | DIASTOLIC BLOOD PRESSURE: 59 MMHG | WEIGHT: 129 LBS | OXYGEN SATURATION: 94 % | RESPIRATION RATE: 16 BRPM | SYSTOLIC BLOOD PRESSURE: 123 MMHG

## 2025-06-27 DIAGNOSIS — R06.09 DYSPNEA ON EXERTION: ICD-10-CM

## 2025-06-27 DIAGNOSIS — F17.211 NICOTINE DEPENDENCE, CIGARETTES, IN REMISSION: ICD-10-CM

## 2025-06-27 DIAGNOSIS — J44.9 CHRONIC OBSTRUCTIVE PULMONARY DISEASE, UNSPECIFIED COPD TYPE: Primary | Chronic | ICD-10-CM

## 2025-06-27 DIAGNOSIS — J96.11 CHRONIC RESPIRATORY FAILURE WITH HYPOXIA: Chronic | ICD-10-CM

## 2025-06-27 DIAGNOSIS — J43.2 CENTRILOBULAR EMPHYSEMA: Chronic | ICD-10-CM

## 2025-06-27 RX ORDER — BUDESONIDE, GLYCOPYRROLATE, AND FORMOTEROL FUMARATE 160; 9; 4.8 UG/1; UG/1; UG/1
2 AEROSOL, METERED RESPIRATORY (INHALATION) 2 TIMES DAILY
Qty: 2 EACH | Refills: 0 | COMMUNITY
Start: 2025-06-27 | End: 2025-06-28

## 2025-06-27 NOTE — PROGRESS NOTES
"Primary Care Provider  Nancy Yeh DO     Referring Provider  Nancy Yeh DO       Patient or patient representative verbalized consent for the use of Ambient Listening during the visit with  PRADIP Zepeda for chart documentation. 6/27/2025  13:55 EDT    Chief Complaint  Follow-up, Establish Care, COPD, and Shortness of Breath (On exertion )    Subjective          Cm Flores presents to [facility]UL[facility]IC[facility]  History of Present Illness  Cm Flores is a 74 y.o. male patient here for management of COPD.    5/20/2025 chest CT: \"No suspicious pulmonary nodules.  Advanced centrilobular emphysema.\"    History of Present Illness  The patient is a 74-year-old male who presents for evaluation of COPD, epistaxis, anxiety, and hypertension.    He reports satisfactory respiratory function but experiences fatigue after prolonged walking, necessitating rest periods. He has been utilizing oxygen therapy since 04/2025, initially with a portable device, and now with a home unit. He also uses it during sleep. He does not experience any wheezing. He has ceased smoking. He finds relief from his Breztri inhaler, which he continues to use.    He experienced two episodes of nosebleeds, one from each nostril, on consecutive days while seated at his kitchen table. He speculates that these episodes may have been triggered by his posture or the consumption of cold water. He has not had any recurrent nosebleeds since these incidents. He recalls a nasal injury sustained during his freshman year, which resulted in a deviated septum. The left nostril is unobstructed, but the right nostril contains cartilage, which causes nasal congestion when he lies down. He hypothesizes that his attempts to clear the nasal passage for oxygen delivery may have led to the nosebleeds.    He is currently on metoprolol for hypertension, which he believes is causing his blood pressure to remain low, " typically around 115/65 or 70.    He is prescribed Klonopin for anxiety, which he takes as needed, and Cymbalta, which he has not been taking regularly.    SOCIAL HISTORY  Tobacco: Quit smoking a few months ago and has not resumed.       His history of smoking is   Tobacco Use: Medium Risk (2025)    Patient History     Smoking Tobacco Use: Former     Smokeless Tobacco Use: Never     Passive Exposure: Past   .    Review of Systems   Constitutional:  Negative for chills, fatigue, fever, unexpected weight gain and unexpected weight loss.   HENT:  Congestion: Nasal.    Respiratory:  Positive for shortness of breath. Negative for apnea, cough and wheezing.         Negative for Hemoptysis     Cardiovascular:  Negative for chest pain, palpitations and leg swelling.   Skin:         Negative for cyanosis      Sleep: Negative for Excessive daytime sleepiness  Negative for morning headaches  Negative for Snoring    Family History   Problem Relation Age of Onset    Heart disease Other         Social History     Socioeconomic History    Marital status: Single   Tobacco Use    Smoking status: Former     Current packs/day: 0.00     Average packs/day: 0.8 packs/day for 53.2 years (39.9 ttl pk-yrs)     Types: Cigarettes     Start date: 1972     Quit date: 3/30/2025     Years since quittin.2     Passive exposure: Past    Smokeless tobacco: Never   Vaping Use    Vaping status: Never Used   Substance and Sexual Activity    Alcohol use: Never    Drug use: Never    Sexual activity: Defer        Past Medical History:   Diagnosis Date    Anxiety     Arthritis     Bladder cancer     Bladder cancer     lining of bladder     Broken bones      as a child     CAD (coronary artery disease) 2014    Calculus of kidney     Cataracts, both eyes     CHF (congestive heart failure)     Constipation     Deafness     Depression     Emphysema of lung     Essential hypertension 2021    Forgetfulness     Gallstones     Head injury      Heart disease     Hemorrhoids     Hernia of pelvic floor     High cholesterol     High cholesterol     History of bladder cancer 06/24/2021    History of heart attack     Hypertension     Kidney stones     Mixed hyperlipidemia 06/24/2021    Primary insomnia 06/24/2021    Shortness of breath     Sinus trouble     Skin disease     UNSURE WHAT KIND/ARMS    Tobacco abuse 06/24/2021        Immunization History   Administered Date(s) Administered    Zostavax 12/18/2014         Allergies   Allergen Reactions    Azithromycin Unknown - High Severity     zpak    Quinolones Other (See Comments)     History of abdominal aortic aneurysm repair    Morphine Unknown - Low Severity    Penicillin G Unknown - Low Severity    Penicillins Unknown - Low Severity    Sulfa Antibiotics Unknown - Low Severity          Current Outpatient Medications:     ALPRAZolam (XANAX) 0.25 MG tablet, Take 1 tablet by mouth At Night As Needed for Anxiety., Disp: 30 tablet, Rfl: 5    Budeson-Glycopyrrol-Formoterol (Breztri Aerosphere) 160-9-4.8 MCG/ACT aerosol inhaler, Inhale 2 puffs 2 (Two) Times a Day., Disp: 10.7 g, Rfl: 5    ipratropium-albuterol (DUO-NEB) 0.5-2.5 mg/3 ml nebulizer, Take 3 mL by nebulization Every 4 (Four) Hours As Needed for Wheezing., Disp: 150 mL, Rfl: 1    metoprolol succinate XL (TOPROL-XL) 25 MG 24 hr tablet, Take 1 tablet by mouth Daily., Disp: 90 tablet, Rfl: 0    nitroglycerin (NITROSTAT) 0.4 MG SL tablet, Place 1 tablet under the tongue Every 5 (Five) Minutes As Needed for Chest Pain. Take no more than 3 doses in 15 minutes., Disp: 25 tablet, Rfl: 2    Budeson-Glycopyrrol-Formoterol (Breztri Aerosphere) 160-9-4.8 MCG/ACT aerosol inhaler, Inhale 2 puffs 2 (Two) Times a Day for 1 day., Disp: 2 each, Rfl: 0    Hydrocod Nehemias-Chlorphe Nehemias ER (TUSSIONEX PENNKINETIC) 10-8 MG/5ML ER suspension, Take 5 mL by mouth Every 12 (Twelve) Hours As Needed for Cough. (Patient not taking: Reported on 6/27/2025), Disp: 100 mL, Rfl: 0  "    Objective   Physical Exam  Constitutional:       General: He is not in acute distress.     Appearance: Normal appearance. He is underweight.   HENT:      Right Ear: Hearing normal.      Left Ear: Hearing normal.      Nose: No nasal tenderness or congestion.      Mouth/Throat:      Mouth: Mucous membranes are moist. No oral lesions.   Eyes:      Extraocular Movements: Extraocular movements intact.      Pupils: Pupils are equal, round, and reactive to light.   Cardiovascular:      Rate and Rhythm: Normal rate and regular rhythm.      Pulses: Normal pulses.      Heart sounds: Normal heart sounds. No murmur heard.  Pulmonary:      Effort: Pulmonary effort is normal.      Breath sounds: Decreased breath sounds present. No wheezing, rhonchi or rales.   Musculoskeletal:      Right lower leg: No edema.      Left lower leg: No edema.   Skin:     General: Skin is warm and dry.      Findings: No lesion or rash.   Neurological:      General: No focal deficit present.      Mental Status: He is alert and oriented to person, place, and time.   Psychiatric:         Mood and Affect: Affect normal. Mood is not anxious or depressed.         Vital Signs:   /59 (BP Location: Right arm, Patient Position: Sitting, Cuff Size: Adult)   Pulse 79   Temp 97.8 °F (36.6 °C)   Resp 16   Ht 180.3 cm (70.98\")   Wt 58.5 kg (129 lb)   SpO2 94% Comment: 2 liters  BMI 18.00 kg/m²        Result Review :   The following data was reviewed by: PRADIP Zepeda on 06/27/2025:  CMP          1/28/2025    05:15 1/29/2025    06:04 4/1/2025    20:32   CMP   Glucose 80  87  102    BUN 23  19  18    Creatinine 1.50  1.36  1.53    EGFR 48.9  54.9  47.7    Sodium 131  130  121    Potassium 4.3  3.9  4.3    Chloride 98  97  85    Calcium 7.5  7.8  8.9    Total Protein   6.7    Albumin   3.7    Globulin   3.0    Total Bilirubin   1.6    Alkaline Phosphatase   62    AST (SGOT)   16    ALT (SGPT)   <5    Albumin/Globulin Ratio   1.2  "   BUN/Creatinine Ratio 15.3  14.0  11.8    Anion Gap 10.6  11.1  11.4      CBC w/diff          1/28/2025    05:15 1/29/2025    06:04 4/1/2025    20:32   CBC w/Diff   WBC 4.48  3.06  7.41    RBC 4.18  3.97  3.94    Hemoglobin 12.6  12.1  12.5    Hematocrit 38.7  37.1  35.6    MCV 92.6  93.5  90.4    MCH 30.1  30.5  31.7    MCHC 32.6  32.6  35.1    RDW 14.7  14.3  14.7    Platelets 52  52  133    Neutrophil Rel % 76.2   63.4    Immature Granulocyte Rel % 0.7   0.7    Lymphocyte Rel % 15.8   25.9    Monocyte Rel % 7.1   9.3    Eosinophil Rel % 0.0   0.4    Basophil Rel % 0.2   0.3      Data reviewed : Radiologic studies chest CT 5/5/2025 and my last office note 11/3/2022   Procedures        Assessment and Plan    Diagnoses and all orders for this visit:    1. Chronic obstructive pulmonary disease, unspecified COPD type (Primary)  Comments:  continue Breztri  Orders:  -     Complete PFT - Pre & Post Bronchodilator; Future  -     6 Minute Walk Test; Future  -     Alpha - 1 - Antitrypsin; Future  -     Budeson-Glycopyrrol-Formoterol (Breztri Aerosphere) 160-9-4.8 MCG/ACT aerosol inhaler; Inhale 2 puffs 2 (Two) Times a Day for 1 day.  Dispense: 2 each; Refill: 0    2. Centrilobular emphysema  Comments:  continue Breztri  Orders:  -     Alpha - 1 - Antitrypsin; Future    3. Chronic respiratory failure with hypoxia  Comments:  continue oxygen  Orders:  -     Complete PFT - Pre & Post Bronchodilator; Future  -     6 Minute Walk Test; Future    4. Nicotine dependence, cigarettes, in remission  Comments:  duonebs as needed  Orders:  -     Alpha - 1 - Antitrypsin; Future    5. Dyspnea on exertion        Assessment & Plan  1. Chronic obstructive pulmonary disease (COPD).  His respiratory function appears stable, with no audible wheezing or rattling sounds. He reports that the Breztri inhaler is effective. He is advised to continue using the Breztri inhaler and his current oxygen therapy regimen. A pulmonary function test will be  ordered to assess the severity of his COPD. An application for a free Breztri inhaler will be provided.    2. Epistaxis.  He experienced nosebleeds on both sides, which have since resolved. The cause could be related to nasal dryness from oxygen use or mechanical irritation. No further intervention is required at this time.              Follow Up   Return in about 3 months (around 9/27/2025) for Recheck.  Patient was given instructions and counseling regarding his condition or for health maintenance advice. Please see specific information pulled into the AVS if appropriate.

## 2025-07-24 ENCOUNTER — OFFICE VISIT (OUTPATIENT)
Dept: FAMILY MEDICINE CLINIC | Facility: CLINIC | Age: 75
End: 2025-07-24
Payer: MEDICARE

## 2025-07-24 VITALS
DIASTOLIC BLOOD PRESSURE: 46 MMHG | SYSTOLIC BLOOD PRESSURE: 98 MMHG | TEMPERATURE: 97.2 F | OXYGEN SATURATION: 92 % | WEIGHT: 139 LBS | RESPIRATION RATE: 19 BRPM | HEIGHT: 71 IN | HEART RATE: 64 BPM | BODY MASS INDEX: 19.46 KG/M2

## 2025-07-24 DIAGNOSIS — J96.11 CHRONIC RESPIRATORY FAILURE WITH HYPOXIA: Chronic | ICD-10-CM

## 2025-07-24 DIAGNOSIS — I10 ESSENTIAL HYPERTENSION: Chronic | ICD-10-CM

## 2025-07-24 DIAGNOSIS — N18.31 STAGE 3A CHRONIC KIDNEY DISEASE: Chronic | ICD-10-CM

## 2025-07-24 DIAGNOSIS — Z79.899 MEDICATION MANAGEMENT: ICD-10-CM

## 2025-07-24 DIAGNOSIS — J44.9 COPD, SEVERE: Chronic | ICD-10-CM

## 2025-07-24 DIAGNOSIS — F41.1 GAD (GENERALIZED ANXIETY DISORDER): Primary | Chronic | ICD-10-CM

## 2025-07-24 PROBLEM — B96.89 BACTERIAL URI: Status: RESOLVED | Noted: 2025-02-20 | Resolved: 2025-07-24

## 2025-07-24 PROBLEM — J06.9 BACTERIAL URI: Status: RESOLVED | Noted: 2025-02-20 | Resolved: 2025-07-24

## 2025-07-24 NOTE — ASSESSMENT & PLAN NOTE
COPD is worsening.    Plan:  Begin taking the following medication/s; Breztri.    Discussed medication dosage, use, side effects, and goals of treatment in detail.    Warning signs of respiratory distress were reviewed with the patient. .    Patient Treatment Goals:   symptom prevention, minimizing limitation in activity, prevention of exacerbations and use of ER/inpatient care, maintenance of optimal pulmonary function, and minimization of adverse effects of treatment    Followup in 6 months.

## 2025-07-24 NOTE — PROGRESS NOTES
"Chief Complaint  COPD and Congestive Heart Failure    Subjective        Cm Flores presents to [facility]  History of Present Illness  He is here today for follow-up for the management of his chronic medical condition.  He does not have any children.  He has a history of kidney stones.  He has coronary artery disease, peripheral vascular disease, tobacco abuse, history of AAA repair in 2016 and bladder cancer in 2008.     He has a 50 pack year smoking habit and he stopped smoking last year.  He was offered a low-dose CT scan today but refused.     He has been managed by hematology for thrombocytopenia.     He has gained 10 lbs since his last visit.     He is using Breztri and it is helping his breathing.     His 02 drops into the low 80's when he takes his 02 off.      The patient has no complaints today and denies weakness, numbness, nausea, vomiting, diarrhea, dizziness or syncopal event            Objective   Vital Signs:  BP 98/46 (BP Location: Left arm, Patient Position: Sitting, Cuff Size: Adult)   Pulse 64   Temp 97.2 °F (36.2 °C) (Temporal)   Resp 19   Ht 180.3 cm (70.98\")   Wt 63 kg (139 lb)   SpO2 92% Comment: 2L NC  BMI 19.40 kg/m²   Estimated body mass index is 19.4 kg/m² as calculated from the following:    Height as of this encounter: 180.3 cm (70.98\").    Weight as of this encounter: 63 kg (139 lb).    BMI is within normal parameters. No other follow-up for BMI required.      Physical Exam  Vitals reviewed.   Constitutional:       Appearance: Normal appearance. He is well-developed.   HENT:      Head: Normocephalic and atraumatic.      Right Ear: External ear normal.      Left Ear: External ear normal.      Mouth/Throat:      Pharynx: No oropharyngeal exudate.   Eyes:      Conjunctiva/sclera: Conjunctivae normal.      Pupils: Pupils are equal, round, and reactive to light.   Neck:      Vascular: No carotid bruit.   Cardiovascular:      Rate and Rhythm: " Normal rate and regular rhythm.      Heart sounds: No murmur heard.     No friction rub. No gallop.   Pulmonary:      Effort: Pulmonary effort is normal.      Breath sounds: Normal breath sounds. No wheezing or rhonchi.   Abdominal:      General: There is no distension.   Skin:     General: Skin is warm and dry.   Neurological:      Mental Status: He is alert and oriented to person, place, and time.      Cranial Nerves: No cranial nerve deficit.      Motor: No weakness.   Psychiatric:         Mood and Affect: Mood and affect normal.         Behavior: Behavior normal.         Thought Content: Thought content normal.         Judgment: Judgment normal.        Result Review :    CMP          1/28/2025    05:15 1/29/2025    06:04 4/1/2025    20:32   CMP   Glucose 80  87  102    BUN 23  19  18    Creatinine 1.50  1.36  1.53    EGFR 48.9  54.9  47.7    Sodium 131  130  121    Potassium 4.3  3.9  4.3    Chloride 98  97  85    Calcium 7.5  7.8  8.9    Total Protein   6.7    Albumin   3.7    Globulin   3.0    Total Bilirubin   1.6    Alkaline Phosphatase   62    AST (SGOT)   16    ALT (SGPT)   <5    Albumin/Globulin Ratio   1.2    BUN/Creatinine Ratio 15.3  14.0  11.8    Anion Gap 10.6  11.1  11.4      CBC          1/28/2025    05:15 1/29/2025    06:04 4/1/2025    20:32   CBC   WBC 4.48  3.06  7.41    RBC 4.18  3.97  3.94    Hemoglobin 12.6  12.1  12.5    Hematocrit 38.7  37.1  35.6    MCV 92.6  93.5  90.4    MCH 30.1  30.5  31.7    MCHC 32.6  32.6  35.1    RDW 14.7  14.3  14.7    Platelets 52  52  133                    Assessment and Plan   Diagnoses and all orders for this visit:    1. ADAMA (generalized anxiety disorder) (Primary)  Assessment & Plan:  The patient's anxiety is currently well-controlled with Xanax quarter milligrams nightly.  UDS, controlled medication contract and Dawit in the chart for review.  The patient's abuse risk is very low.      2. Chronic respiratory failure with hypoxia  Assessment & Plan:  The  patient's breathing is currently well-controlled on 2 L nasal cannula daily.      3. Stage 3a chronic kidney disease  Assessment & Plan:  Renal condition is stable.  Continue current treatment regimen.  Renal condition will be reassessed in 6 months.      4. COPD, severe  Assessment & Plan:  COPD is worsening.    Plan:  Begin taking the following medication/s; Breztri.    Discussed medication dosage, use, side effects, and goals of treatment in detail.    Warning signs of respiratory distress were reviewed with the patient. .    Patient Treatment Goals:   symptom prevention, minimizing limitation in activity, prevention of exacerbations and use of ER/inpatient care, maintenance of optimal pulmonary function, and minimization of adverse effects of treatment    Followup in 6 months.      5. Medication management  -     POC Medline 12 Panel Urine Drug Screen    6. Essential hypertension  Assessment & Plan:  Hypertension is stable and controlled  Continue current treatment regimen.  Dietary sodium restriction.  Blood pressure will be reassessed in 6 months.                 Follow Up   Return in about 6 months (around 1/24/2026).  Patient was given instructions and counseling regarding his condition or for health maintenance advice. Please see specific information pulled into the AVS if appropriate.         Patient or patient representative verbalized consent for the use of Ambient Listening during the visit with  Nancy Yeh DO for chart documentation. 7/24/2025  13:46 EDT